# Patient Record
Sex: MALE | Race: WHITE | NOT HISPANIC OR LATINO
[De-identification: names, ages, dates, MRNs, and addresses within clinical notes are randomized per-mention and may not be internally consistent; named-entity substitution may affect disease eponyms.]

---

## 2017-04-05 ENCOUNTER — APPOINTMENT (OUTPATIENT)
Dept: OTOLARYNGOLOGY | Facility: CLINIC | Age: 67
End: 2017-04-05

## 2017-04-12 ENCOUNTER — OUTPATIENT (OUTPATIENT)
Dept: OUTPATIENT SERVICES | Facility: HOSPITAL | Age: 67
LOS: 1 days | Discharge: HOME | End: 2017-04-12

## 2017-05-17 ENCOUNTER — APPOINTMENT (OUTPATIENT)
Dept: OTOLARYNGOLOGY | Facility: CLINIC | Age: 67
End: 2017-05-17

## 2017-06-27 DIAGNOSIS — G93.9 DISORDER OF BRAIN, UNSPECIFIED: ICD-10-CM

## 2017-06-27 DIAGNOSIS — H53.8 OTHER VISUAL DISTURBANCES: ICD-10-CM

## 2017-07-25 ENCOUNTER — OUTPATIENT (OUTPATIENT)
Dept: OUTPATIENT SERVICES | Facility: HOSPITAL | Age: 67
LOS: 1 days | Discharge: HOME | End: 2017-07-25

## 2017-07-25 DIAGNOSIS — D32.0 BENIGN NEOPLASM OF CEREBRAL MENINGES: ICD-10-CM

## 2017-07-25 DIAGNOSIS — R55 SYNCOPE AND COLLAPSE: ICD-10-CM

## 2017-07-25 DIAGNOSIS — I10 ESSENTIAL (PRIMARY) HYPERTENSION: ICD-10-CM

## 2017-07-25 DIAGNOSIS — I48.91 UNSPECIFIED ATRIAL FIBRILLATION: ICD-10-CM

## 2017-07-25 DIAGNOSIS — I25.10 ATHEROSCLEROTIC HEART DISEASE OF NATIVE CORONARY ARTERY WITHOUT ANGINA PECTORIS: ICD-10-CM

## 2018-03-07 ENCOUNTER — APPOINTMENT (OUTPATIENT)
Dept: OTOLARYNGOLOGY | Facility: CLINIC | Age: 68
End: 2018-03-07
Payer: MEDICARE

## 2018-03-07 VITALS
HEIGHT: 71 IN | BODY MASS INDEX: 27.3 KG/M2 | WEIGHT: 195 LBS | DIASTOLIC BLOOD PRESSURE: 92 MMHG | SYSTOLIC BLOOD PRESSURE: 132 MMHG

## 2018-03-07 DIAGNOSIS — F17.200 NICOTINE DEPENDENCE, UNSPECIFIED, UNCOMPLICATED: ICD-10-CM

## 2018-03-07 PROCEDURE — 99214 OFFICE O/P EST MOD 30 MIN: CPT

## 2018-04-03 ENCOUNTER — OUTPATIENT (OUTPATIENT)
Dept: OUTPATIENT SERVICES | Facility: HOSPITAL | Age: 68
LOS: 1 days | Discharge: HOME | End: 2018-04-03

## 2018-04-03 VITALS
RESPIRATION RATE: 20 BRPM | TEMPERATURE: 97 F | OXYGEN SATURATION: 97 % | SYSTOLIC BLOOD PRESSURE: 141 MMHG | HEART RATE: 80 BPM | DIASTOLIC BLOOD PRESSURE: 84 MMHG | HEIGHT: 71 IN

## 2018-04-03 DIAGNOSIS — Z01.818 ENCOUNTER FOR OTHER PREPROCEDURAL EXAMINATION: ICD-10-CM

## 2018-04-03 DIAGNOSIS — D49.0 NEOPLASM OF UNSPECIFIED BEHAVIOR OF DIGESTIVE SYSTEM: ICD-10-CM

## 2018-04-03 DIAGNOSIS — I25.10 ATHEROSCLEROTIC HEART DISEASE OF NATIVE CORONARY ARTERY WITHOUT ANGINA PECTORIS: Chronic | ICD-10-CM

## 2018-04-03 LAB
ALBUMIN SERPL ELPH-MCNC: 4.1 G/DL — SIGNIFICANT CHANGE UP (ref 3.5–5.2)
ALP SERPL-CCNC: 67 U/L — SIGNIFICANT CHANGE UP (ref 30–115)
ALT FLD-CCNC: 13 U/L — SIGNIFICANT CHANGE UP (ref 0–41)
ANION GAP SERPL CALC-SCNC: 11 MMOL/L — SIGNIFICANT CHANGE UP (ref 7–14)
APTT BLD: 28.4 SEC — SIGNIFICANT CHANGE UP (ref 27–39.2)
AST SERPL-CCNC: 15 U/L — SIGNIFICANT CHANGE UP (ref 0–41)
BASOPHILS # BLD AUTO: 0.05 K/UL — SIGNIFICANT CHANGE UP (ref 0–0.2)
BASOPHILS NFR BLD AUTO: 0.7 % — SIGNIFICANT CHANGE UP (ref 0–1)
BILIRUB SERPL-MCNC: <0.2 MG/DL — SIGNIFICANT CHANGE UP (ref 0.2–1.2)
BUN SERPL-MCNC: 18 MG/DL — SIGNIFICANT CHANGE UP (ref 10–20)
CALCIUM SERPL-MCNC: 9.2 MG/DL — SIGNIFICANT CHANGE UP (ref 8.5–10.1)
CHLORIDE SERPL-SCNC: 105 MMOL/L — SIGNIFICANT CHANGE UP (ref 98–110)
CO2 SERPL-SCNC: 23 MMOL/L — SIGNIFICANT CHANGE UP (ref 17–32)
CREAT SERPL-MCNC: 1.1 MG/DL — SIGNIFICANT CHANGE UP (ref 0.7–1.5)
EOSINOPHIL # BLD AUTO: 0.1 K/UL — SIGNIFICANT CHANGE UP (ref 0–0.7)
EOSINOPHIL NFR BLD AUTO: 1.4 % — SIGNIFICANT CHANGE UP (ref 0–8)
GLUCOSE SERPL-MCNC: 102 MG/DL — HIGH (ref 70–99)
HCT VFR BLD CALC: 47.8 % — SIGNIFICANT CHANGE UP (ref 42–52)
HGB BLD-MCNC: 14.9 G/DL — SIGNIFICANT CHANGE UP (ref 14–18)
IMM GRANULOCYTES NFR BLD AUTO: 0.4 % — HIGH (ref 0.1–0.3)
INR BLD: 1.11 RATIO — SIGNIFICANT CHANGE UP (ref 0.65–1.3)
LYMPHOCYTES # BLD AUTO: 1.15 K/UL — LOW (ref 1.2–3.4)
LYMPHOCYTES # BLD AUTO: 16.5 % — LOW (ref 20.5–51.1)
MCHC RBC-ENTMCNC: 25.3 PG — LOW (ref 27–31)
MCHC RBC-ENTMCNC: 31.2 G/DL — LOW (ref 32–37)
MCV RBC AUTO: 81.3 FL — SIGNIFICANT CHANGE UP (ref 80–94)
MONOCYTES # BLD AUTO: 0.69 K/UL — HIGH (ref 0.1–0.6)
MONOCYTES NFR BLD AUTO: 9.9 % — HIGH (ref 1.7–9.3)
NEUTROPHILS # BLD AUTO: 4.94 K/UL — SIGNIFICANT CHANGE UP (ref 1.4–6.5)
NEUTROPHILS NFR BLD AUTO: 71.1 % — SIGNIFICANT CHANGE UP (ref 42.2–75.2)
NRBC # BLD: 0 /100 WBCS — SIGNIFICANT CHANGE UP (ref 0–0)
PLATELET # BLD AUTO: 200 K/UL — SIGNIFICANT CHANGE UP (ref 130–400)
POTASSIUM SERPL-MCNC: 4.4 MMOL/L — SIGNIFICANT CHANGE UP (ref 3.5–5)
POTASSIUM SERPL-SCNC: 4.4 MMOL/L — SIGNIFICANT CHANGE UP (ref 3.5–5)
PROT SERPL-MCNC: 7 G/DL — SIGNIFICANT CHANGE UP (ref 6–8)
PROTHROM AB SERPL-ACNC: 12 SEC — SIGNIFICANT CHANGE UP (ref 9.95–12.87)
RBC # BLD: 5.88 M/UL — SIGNIFICANT CHANGE UP (ref 4.7–6.1)
RBC # FLD: 19 % — HIGH (ref 11.5–14.5)
SODIUM SERPL-SCNC: 139 MMOL/L — SIGNIFICANT CHANGE UP (ref 135–146)
WBC # BLD: 6.96 K/UL — SIGNIFICANT CHANGE UP (ref 4.8–10.8)
WBC # FLD AUTO: 6.96 K/UL — SIGNIFICANT CHANGE UP (ref 4.8–10.8)

## 2018-04-03 NOTE — H&P PST ADULT - HISTORY OF PRESENT ILLNESS
58 Y/O MALE WITH H/O RIGHT PAROTID MASS SCHEDULED FOR EXCISION OF SAME  REPORTS NO C/O CP,SOB,PALPITATIONS,COUGH OR DYSURIA  1-2 FOS WITHOUT SOB

## 2018-04-03 NOTE — H&P PST ADULT - PMH
CAD (coronary artery disease)  HAD CARDIAC CATH WITH STENTS 2006  CAD (coronary artery disease)    COPD (chronic obstructive pulmonary disease)    Hypercholesteremia    Myocardial infarction  2006

## 2018-04-17 ENCOUNTER — APPOINTMENT (OUTPATIENT)
Dept: OTOLARYNGOLOGY | Facility: AMBULATORY SURGERY CENTER | Age: 68
End: 2018-04-17

## 2018-04-30 ENCOUNTER — OTHER (OUTPATIENT)
Age: 68
End: 2018-04-30

## 2018-05-01 ENCOUNTER — OUTPATIENT (OUTPATIENT)
Dept: OUTPATIENT SERVICES | Facility: HOSPITAL | Age: 68
LOS: 1 days | Discharge: HOME | End: 2018-05-01
Payer: MEDICARE

## 2018-05-01 ENCOUNTER — APPOINTMENT (OUTPATIENT)
Dept: OTOLARYNGOLOGY | Facility: AMBULATORY SURGERY CENTER | Age: 68
End: 2018-05-01

## 2018-05-01 ENCOUNTER — RESULT REVIEW (OUTPATIENT)
Age: 68
End: 2018-05-01

## 2018-05-01 VITALS
SYSTOLIC BLOOD PRESSURE: 120 MMHG | HEART RATE: 76 BPM | DIASTOLIC BLOOD PRESSURE: 78 MMHG | OXYGEN SATURATION: 98 % | RESPIRATION RATE: 18 BRPM

## 2018-05-01 VITALS
OXYGEN SATURATION: 96 % | TEMPERATURE: 98 F | SYSTOLIC BLOOD PRESSURE: 115 MMHG | DIASTOLIC BLOOD PRESSURE: 77 MMHG | RESPIRATION RATE: 18 BRPM | WEIGHT: 199.96 LBS | HEIGHT: 71 IN | HEART RATE: 62 BPM

## 2018-05-01 DIAGNOSIS — I25.10 ATHEROSCLEROTIC HEART DISEASE OF NATIVE CORONARY ARTERY WITHOUT ANGINA PECTORIS: Chronic | ICD-10-CM

## 2018-05-01 PROCEDURE — 42415 EXCISE PAROTID GLAND/LESION: CPT | Mod: RT

## 2018-05-01 RX ORDER — ONDANSETRON 8 MG/1
4 TABLET, FILM COATED ORAL ONCE
Qty: 0 | Refills: 0 | Status: DISCONTINUED | OUTPATIENT
Start: 2018-05-01 | End: 2018-05-16

## 2018-05-01 RX ORDER — MORPHINE SULFATE 50 MG/1
4 CAPSULE, EXTENDED RELEASE ORAL
Qty: 0 | Refills: 0 | Status: DISCONTINUED | OUTPATIENT
Start: 2018-05-01 | End: 2018-05-01

## 2018-05-01 NOTE — BRIEF OPERATIVE NOTE - PROCEDURE
<<-----Click on this checkbox to enter Procedure Parotidectomy with intraoperative facial nerve monitoring  05/01/2018    Active  [place]

## 2018-05-04 ENCOUNTER — APPOINTMENT (OUTPATIENT)
Dept: OTOLARYNGOLOGY | Facility: CLINIC | Age: 68
End: 2018-05-04
Payer: MEDICARE

## 2018-05-04 VITALS
BODY MASS INDEX: 27.3 KG/M2 | SYSTOLIC BLOOD PRESSURE: 129 MMHG | HEIGHT: 71 IN | WEIGHT: 195 LBS | DIASTOLIC BLOOD PRESSURE: 79 MMHG

## 2018-05-04 DIAGNOSIS — G89.18 OTHER ACUTE POSTPROCEDURAL PAIN: ICD-10-CM

## 2018-05-04 LAB — SURGICAL PATHOLOGY STUDY: SIGNIFICANT CHANGE UP

## 2018-05-04 PROCEDURE — 99024 POSTOP FOLLOW-UP VISIT: CPT

## 2018-05-07 DIAGNOSIS — K11.6 MUCOCELE OF SALIVARY GLAND: ICD-10-CM

## 2018-05-07 DIAGNOSIS — J44.9 CHRONIC OBSTRUCTIVE PULMONARY DISEASE, UNSPECIFIED: ICD-10-CM

## 2018-05-07 DIAGNOSIS — I25.10 ATHEROSCLEROTIC HEART DISEASE OF NATIVE CORONARY ARTERY WITHOUT ANGINA PECTORIS: ICD-10-CM

## 2018-05-07 DIAGNOSIS — D11.0 BENIGN NEOPLASM OF PAROTID GLAND: ICD-10-CM

## 2018-05-07 DIAGNOSIS — E78.00 PURE HYPERCHOLESTEROLEMIA, UNSPECIFIED: ICD-10-CM

## 2018-05-10 ENCOUNTER — APPOINTMENT (OUTPATIENT)
Dept: OTOLARYNGOLOGY | Facility: CLINIC | Age: 68
End: 2018-05-10
Payer: MEDICARE

## 2018-05-10 VITALS — WEIGHT: 195 LBS | BODY MASS INDEX: 36.82 KG/M2 | HEIGHT: 61 IN

## 2018-05-10 PROCEDURE — 99024 POSTOP FOLLOW-UP VISIT: CPT

## 2018-05-25 ENCOUNTER — APPOINTMENT (OUTPATIENT)
Dept: OTOLARYNGOLOGY | Facility: CLINIC | Age: 68
End: 2018-05-25
Payer: MEDICARE

## 2018-05-25 VITALS — WEIGHT: 195 LBS | BODY MASS INDEX: 36.82 KG/M2 | HEIGHT: 61 IN

## 2018-05-25 PROCEDURE — 99024 POSTOP FOLLOW-UP VISIT: CPT

## 2018-09-14 ENCOUNTER — APPOINTMENT (OUTPATIENT)
Dept: OTOLARYNGOLOGY | Facility: CLINIC | Age: 68
End: 2018-09-14

## 2018-10-11 PROBLEM — I25.10 ATHEROSCLEROTIC HEART DISEASE OF NATIVE CORONARY ARTERY WITHOUT ANGINA PECTORIS: Chronic | Status: ACTIVE | Noted: 2018-04-03

## 2018-10-11 PROBLEM — J44.9 CHRONIC OBSTRUCTIVE PULMONARY DISEASE, UNSPECIFIED: Chronic | Status: ACTIVE | Noted: 2018-04-03

## 2018-10-11 PROBLEM — E78.00 PURE HYPERCHOLESTEROLEMIA, UNSPECIFIED: Chronic | Status: ACTIVE | Noted: 2018-04-03

## 2018-10-11 PROBLEM — I21.9 ACUTE MYOCARDIAL INFARCTION, UNSPECIFIED: Chronic | Status: ACTIVE | Noted: 2018-04-03

## 2018-11-14 ENCOUNTER — APPOINTMENT (OUTPATIENT)
Dept: OTOLARYNGOLOGY | Facility: CLINIC | Age: 68
End: 2018-11-14
Payer: MEDICARE

## 2018-11-14 VITALS
WEIGHT: 195 LBS | BODY MASS INDEX: 36.82 KG/M2 | DIASTOLIC BLOOD PRESSURE: 89 MMHG | HEIGHT: 61 IN | SYSTOLIC BLOOD PRESSURE: 133 MMHG

## 2018-11-14 PROCEDURE — 99213 OFFICE O/P EST LOW 20 MIN: CPT

## 2019-05-13 ENCOUNTER — APPOINTMENT (OUTPATIENT)
Dept: OTOLARYNGOLOGY | Facility: CLINIC | Age: 69
End: 2019-05-13

## 2019-07-17 ENCOUNTER — APPOINTMENT (OUTPATIENT)
Dept: OTOLARYNGOLOGY | Facility: CLINIC | Age: 69
End: 2019-07-17
Payer: MEDICARE

## 2019-07-17 VITALS — BODY MASS INDEX: 27.3 KG/M2 | HEIGHT: 71 IN | WEIGHT: 195 LBS

## 2019-07-17 PROCEDURE — 92550 TYMPANOMETRY & REFLEX THRESH: CPT

## 2019-07-17 PROCEDURE — 92557 COMPREHENSIVE HEARING TEST: CPT

## 2019-07-17 PROCEDURE — 99213 OFFICE O/P EST LOW 20 MIN: CPT | Mod: 25

## 2019-07-17 PROCEDURE — G0268 REMOVAL OF IMPACTED WAX MD: CPT

## 2019-07-17 NOTE — PHYSICAL EXAM
[de-identified] : soft surgical area.  [de-identified] : left impacted wax cleaned with curette [Midline] : trachea located in midline position [Normal] : no rashes

## 2019-07-17 NOTE — HISTORY OF PRESENT ILLNESS
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 Patient underwent bilateral parotidectomy with nerve dissection. Patient feeling well; no new changes. He is complaining of bilateral itchy ears. Has not had a hearing test in a while.

## 2019-07-23 ENCOUNTER — OUTPATIENT (OUTPATIENT)
Dept: OUTPATIENT SERVICES | Facility: HOSPITAL | Age: 69
LOS: 1 days | Discharge: HOME | End: 2019-07-23
Payer: MEDICARE

## 2019-07-23 ENCOUNTER — TRANSCRIPTION ENCOUNTER (OUTPATIENT)
Age: 69
End: 2019-07-23

## 2019-07-23 ENCOUNTER — RESULT REVIEW (OUTPATIENT)
Age: 69
End: 2019-07-23

## 2019-07-23 VITALS
HEIGHT: 71 IN | WEIGHT: 195.11 LBS | DIASTOLIC BLOOD PRESSURE: 69 MMHG | HEART RATE: 78 BPM | TEMPERATURE: 98 F | SYSTOLIC BLOOD PRESSURE: 105 MMHG | RESPIRATION RATE: 16 BRPM

## 2019-07-23 DIAGNOSIS — I25.10 ATHEROSCLEROTIC HEART DISEASE OF NATIVE CORONARY ARTERY WITHOUT ANGINA PECTORIS: Chronic | ICD-10-CM

## 2019-07-23 DIAGNOSIS — Z95.5 PRESENCE OF CORONARY ANGIOPLASTY IMPLANT AND GRAFT: Chronic | ICD-10-CM

## 2019-07-23 PROCEDURE — 88305 TISSUE EXAM BY PATHOLOGIST: CPT | Mod: 26

## 2019-07-23 RX ORDER — SIMVASTATIN 20 MG/1
1 TABLET, FILM COATED ORAL
Qty: 0 | Refills: 0 | DISCHARGE

## 2019-07-23 NOTE — ASU PATIENT PROFILE, ADULT - PMH
CAD (coronary artery disease)  HAD CARDIAC CATH WITH STENTS 2006  CAD (coronary artery disease)    COPD (chronic obstructive pulmonary disease)    HTN (hypertension)    Hypercholesteremia    Myocardial infarction  2006

## 2019-07-23 NOTE — ASU PATIENT PROFILE, ADULT - PSH
CAD (coronary artery disease)  CARDIAC CATH 2006  AICD 2015  History of coronary artery stent placement

## 2019-07-23 NOTE — ASU DISCHARGE PLAN (ADULT/PEDIATRIC) - CALL YOUR DOCTOR IF YOU HAVE ANY OF THE FOLLOWING:
Bleeding that does not stop/Nausea and vomiting that does not stop/Pain not relieved by Medications/Inability to tolerate liquids or foods

## 2019-07-25 LAB — SURGICAL PATHOLOGY STUDY: SIGNIFICANT CHANGE UP

## 2019-07-26 DIAGNOSIS — K64.8 OTHER HEMORRHOIDS: ICD-10-CM

## 2019-07-26 DIAGNOSIS — D12.5 BENIGN NEOPLASM OF SIGMOID COLON: ICD-10-CM

## 2019-07-26 DIAGNOSIS — K57.32 DIVERTICULITIS OF LARGE INTESTINE WITHOUT PERFORATION OR ABSCESS WITHOUT BLEEDING: ICD-10-CM

## 2019-07-26 DIAGNOSIS — Z79.02 LONG TERM (CURRENT) USE OF ANTITHROMBOTICS/ANTIPLATELETS: ICD-10-CM

## 2019-07-26 DIAGNOSIS — Z95.5 PRESENCE OF CORONARY ANGIOPLASTY IMPLANT AND GRAFT: ICD-10-CM

## 2019-07-26 DIAGNOSIS — Z12.11 ENCOUNTER FOR SCREENING FOR MALIGNANT NEOPLASM OF COLON: ICD-10-CM

## 2019-07-26 DIAGNOSIS — E78.00 PURE HYPERCHOLESTEROLEMIA, UNSPECIFIED: ICD-10-CM

## 2019-07-26 DIAGNOSIS — I25.10 ATHEROSCLEROTIC HEART DISEASE OF NATIVE CORONARY ARTERY WITHOUT ANGINA PECTORIS: ICD-10-CM

## 2019-07-26 DIAGNOSIS — I10 ESSENTIAL (PRIMARY) HYPERTENSION: ICD-10-CM

## 2019-07-26 DIAGNOSIS — D12.3 BENIGN NEOPLASM OF TRANSVERSE COLON: ICD-10-CM

## 2019-07-26 DIAGNOSIS — J44.9 CHRONIC OBSTRUCTIVE PULMONARY DISEASE, UNSPECIFIED: ICD-10-CM

## 2019-07-26 DIAGNOSIS — D12.2 BENIGN NEOPLASM OF ASCENDING COLON: ICD-10-CM

## 2019-07-26 DIAGNOSIS — D12.4 BENIGN NEOPLASM OF DESCENDING COLON: ICD-10-CM

## 2019-07-26 DIAGNOSIS — Z79.82 LONG TERM (CURRENT) USE OF ASPIRIN: ICD-10-CM

## 2019-07-26 DIAGNOSIS — I25.2 OLD MYOCARDIAL INFARCTION: ICD-10-CM

## 2019-07-26 DIAGNOSIS — K64.4 RESIDUAL HEMORRHOIDAL SKIN TAGS: ICD-10-CM

## 2019-10-02 VITALS — WEIGHT: 207.01 LBS | HEIGHT: 65 IN

## 2020-01-10 ENCOUNTER — APPOINTMENT (OUTPATIENT)
Dept: OTOLARYNGOLOGY | Facility: CLINIC | Age: 70
End: 2020-01-10

## 2020-01-10 PROBLEM — I10 ESSENTIAL (PRIMARY) HYPERTENSION: Chronic | Status: ACTIVE | Noted: 2019-07-23

## 2020-02-10 ENCOUNTER — INPATIENT (INPATIENT)
Facility: HOSPITAL | Age: 70
LOS: 0 days | Discharge: HOME | End: 2020-02-11
Attending: INTERNAL MEDICINE | Admitting: INTERNAL MEDICINE
Payer: MEDICARE

## 2020-02-10 VITALS
TEMPERATURE: 99 F | DIASTOLIC BLOOD PRESSURE: 58 MMHG | SYSTOLIC BLOOD PRESSURE: 113 MMHG | RESPIRATION RATE: 18 BRPM | OXYGEN SATURATION: 98 % | HEART RATE: 78 BPM

## 2020-02-10 DIAGNOSIS — Z95.5 PRESENCE OF CORONARY ANGIOPLASTY IMPLANT AND GRAFT: Chronic | ICD-10-CM

## 2020-02-10 DIAGNOSIS — I25.10 ATHEROSCLEROTIC HEART DISEASE OF NATIVE CORONARY ARTERY WITHOUT ANGINA PECTORIS: Chronic | ICD-10-CM

## 2020-02-10 LAB
ALBUMIN SERPL ELPH-MCNC: 4.3 G/DL — SIGNIFICANT CHANGE UP (ref 3.5–5.2)
ALP SERPL-CCNC: 63 U/L — SIGNIFICANT CHANGE UP (ref 30–115)
ALT FLD-CCNC: 13 U/L — SIGNIFICANT CHANGE UP (ref 0–41)
ANION GAP SERPL CALC-SCNC: 13 MMOL/L — SIGNIFICANT CHANGE UP (ref 7–14)
AST SERPL-CCNC: 14 U/L — SIGNIFICANT CHANGE UP (ref 0–41)
BASOPHILS # BLD AUTO: 0.04 K/UL — SIGNIFICANT CHANGE UP (ref 0–0.2)
BASOPHILS NFR BLD AUTO: 0.5 % — SIGNIFICANT CHANGE UP (ref 0–1)
BILIRUB SERPL-MCNC: 0.3 MG/DL — SIGNIFICANT CHANGE UP (ref 0.2–1.2)
BUN SERPL-MCNC: 20 MG/DL — SIGNIFICANT CHANGE UP (ref 10–20)
CALCIUM SERPL-MCNC: 9.9 MG/DL — SIGNIFICANT CHANGE UP (ref 8.5–10.1)
CHLORIDE SERPL-SCNC: 102 MMOL/L — SIGNIFICANT CHANGE UP (ref 98–110)
CK MB CFR SERPL CALC: 3 NG/ML — SIGNIFICANT CHANGE UP (ref 0.6–6.3)
CK SERPL-CCNC: 69 U/L — SIGNIFICANT CHANGE UP (ref 0–225)
CO2 SERPL-SCNC: 20 MMOL/L — SIGNIFICANT CHANGE UP (ref 17–32)
CREAT SERPL-MCNC: 1.2 MG/DL — SIGNIFICANT CHANGE UP (ref 0.7–1.5)
EOSINOPHIL # BLD AUTO: 0.06 K/UL — SIGNIFICANT CHANGE UP (ref 0–0.7)
EOSINOPHIL NFR BLD AUTO: 0.8 % — SIGNIFICANT CHANGE UP (ref 0–8)
GLUCOSE SERPL-MCNC: 103 MG/DL — HIGH (ref 70–99)
HCT VFR BLD CALC: 50.4 % — SIGNIFICANT CHANGE UP (ref 42–52)
HGB BLD-MCNC: 16.3 G/DL — SIGNIFICANT CHANGE UP (ref 14–18)
IMM GRANULOCYTES NFR BLD AUTO: 0.3 % — SIGNIFICANT CHANGE UP (ref 0.1–0.3)
LYMPHOCYTES # BLD AUTO: 1.25 K/UL — SIGNIFICANT CHANGE UP (ref 1.2–3.4)
LYMPHOCYTES # BLD AUTO: 16.3 % — LOW (ref 20.5–51.1)
MAGNESIUM SERPL-MCNC: 2 MG/DL — SIGNIFICANT CHANGE UP (ref 1.8–2.4)
MCHC RBC-ENTMCNC: 28 PG — SIGNIFICANT CHANGE UP (ref 27–31)
MCHC RBC-ENTMCNC: 32.3 G/DL — SIGNIFICANT CHANGE UP (ref 32–37)
MCV RBC AUTO: 86.4 FL — SIGNIFICANT CHANGE UP (ref 80–94)
MONOCYTES # BLD AUTO: 0.69 K/UL — HIGH (ref 0.1–0.6)
MONOCYTES NFR BLD AUTO: 9 % — SIGNIFICANT CHANGE UP (ref 1.7–9.3)
NEUTROPHILS # BLD AUTO: 5.6 K/UL — SIGNIFICANT CHANGE UP (ref 1.4–6.5)
NEUTROPHILS NFR BLD AUTO: 73.1 % — SIGNIFICANT CHANGE UP (ref 42.2–75.2)
NRBC # BLD: 0 /100 WBCS — SIGNIFICANT CHANGE UP (ref 0–0)
NT-PROBNP SERPL-SCNC: 258 PG/ML — SIGNIFICANT CHANGE UP (ref 0–300)
PLATELET # BLD AUTO: 169 K/UL — SIGNIFICANT CHANGE UP (ref 130–400)
POTASSIUM SERPL-MCNC: 4.3 MMOL/L — SIGNIFICANT CHANGE UP (ref 3.5–5)
POTASSIUM SERPL-SCNC: 4.3 MMOL/L — SIGNIFICANT CHANGE UP (ref 3.5–5)
PROT SERPL-MCNC: 7.3 G/DL — SIGNIFICANT CHANGE UP (ref 6–8)
RBC # BLD: 5.83 M/UL — SIGNIFICANT CHANGE UP (ref 4.7–6.1)
RBC # FLD: 16.5 % — HIGH (ref 11.5–14.5)
SODIUM SERPL-SCNC: 135 MMOL/L — SIGNIFICANT CHANGE UP (ref 135–146)
TROPONIN T SERPL-MCNC: <0.01 NG/ML — SIGNIFICANT CHANGE UP
TROPONIN T SERPL-MCNC: <0.01 NG/ML — SIGNIFICANT CHANGE UP
WBC # BLD: 7.66 K/UL — SIGNIFICANT CHANGE UP (ref 4.8–10.8)
WBC # FLD AUTO: 7.66 K/UL — SIGNIFICANT CHANGE UP (ref 4.8–10.8)

## 2020-02-10 PROCEDURE — 93010 ELECTROCARDIOGRAM REPORT: CPT

## 2020-02-10 PROCEDURE — 99285 EMERGENCY DEPT VISIT HI MDM: CPT

## 2020-02-10 PROCEDURE — 71046 X-RAY EXAM CHEST 2 VIEWS: CPT | Mod: 26

## 2020-02-10 RX ORDER — ALBUTEROL 90 UG/1
2 AEROSOL, METERED ORAL EVERY 6 HOURS
Refills: 0 | Status: DISCONTINUED | OUTPATIENT
Start: 2020-02-10 | End: 2020-02-11

## 2020-02-10 RX ORDER — ASPIRIN/CALCIUM CARB/MAGNESIUM 324 MG
81 TABLET ORAL DAILY
Refills: 0 | Status: DISCONTINUED | OUTPATIENT
Start: 2020-02-10 | End: 2020-02-11

## 2020-02-10 RX ORDER — PANTOPRAZOLE SODIUM 20 MG/1
40 TABLET, DELAYED RELEASE ORAL
Refills: 0 | Status: DISCONTINUED | OUTPATIENT
Start: 2020-02-10 | End: 2020-02-11

## 2020-02-10 RX ORDER — METOPROLOL TARTRATE 50 MG
1 TABLET ORAL
Qty: 0 | Refills: 0 | DISCHARGE

## 2020-02-10 RX ORDER — OMEGA-3 ACID ETHYL ESTERS 1 G
1 CAPSULE ORAL
Qty: 0 | Refills: 0 | DISCHARGE

## 2020-02-10 RX ORDER — IPRATROPIUM/ALBUTEROL SULFATE 18-103MCG
3 AEROSOL WITH ADAPTER (GRAM) INHALATION EVERY 6 HOURS
Refills: 0 | Status: DISCONTINUED | OUTPATIENT
Start: 2020-02-10 | End: 2020-02-11

## 2020-02-10 RX ORDER — METOPROLOL TARTRATE 50 MG
50 TABLET ORAL DAILY
Refills: 0 | Status: DISCONTINUED | OUTPATIENT
Start: 2020-02-10 | End: 2020-02-11

## 2020-02-10 RX ORDER — ALBUTEROL 90 UG/1
2 AEROSOL, METERED ORAL
Qty: 0 | Refills: 0 | DISCHARGE

## 2020-02-10 RX ORDER — AMLODIPINE BESYLATE 2.5 MG/1
5 TABLET ORAL DAILY
Refills: 0 | Status: DISCONTINUED | OUTPATIENT
Start: 2020-02-10 | End: 2020-02-11

## 2020-02-10 RX ORDER — HEPARIN SODIUM 5000 [USP'U]/ML
5000 INJECTION INTRAVENOUS; SUBCUTANEOUS EVERY 8 HOURS
Refills: 0 | Status: DISCONTINUED | OUTPATIENT
Start: 2020-02-10 | End: 2020-02-11

## 2020-02-10 RX ORDER — ATORVASTATIN CALCIUM 80 MG/1
40 TABLET, FILM COATED ORAL AT BEDTIME
Refills: 0 | Status: DISCONTINUED | OUTPATIENT
Start: 2020-02-10 | End: 2020-02-11

## 2020-02-10 RX ORDER — ASPIRIN/CALCIUM CARB/MAGNESIUM 324 MG
243 TABLET ORAL ONCE
Refills: 0 | Status: COMPLETED | OUTPATIENT
Start: 2020-02-10 | End: 2020-02-10

## 2020-02-10 RX ORDER — CLOPIDOGREL BISULFATE 75 MG/1
75 TABLET, FILM COATED ORAL DAILY
Refills: 0 | Status: DISCONTINUED | OUTPATIENT
Start: 2020-02-10 | End: 2020-02-11

## 2020-02-10 RX ORDER — CHOLECALCIFEROL (VITAMIN D3) 125 MCG
1 CAPSULE ORAL
Qty: 0 | Refills: 0 | DISCHARGE

## 2020-02-10 RX ORDER — CHLORHEXIDINE GLUCONATE 213 G/1000ML
1 SOLUTION TOPICAL
Refills: 0 | Status: DISCONTINUED | OUTPATIENT
Start: 2020-02-10 | End: 2020-02-11

## 2020-02-10 RX ORDER — LISINOPRIL 2.5 MG/1
5 TABLET ORAL DAILY
Refills: 0 | Status: DISCONTINUED | OUTPATIENT
Start: 2020-02-10 | End: 2020-02-11

## 2020-02-10 RX ADMIN — Medication 243 MILLIGRAM(S): at 17:36

## 2020-02-10 RX ADMIN — ATORVASTATIN CALCIUM 40 MILLIGRAM(S): 80 TABLET, FILM COATED ORAL at 21:56

## 2020-02-10 NOTE — H&P ADULT - HISTORY OF PRESENT ILLNESS
70 yo M  PMH significant for HTN, CAD s/p 5 stent placements, h/o MI in 2006 s/p AICD, HLD, active smoker ( 1/5 pack per day x > 10 years)  Other PMH includes COPD  Patient denies history of diabetes, stroke / TIA  History:   Patient presents for the evaluation of chest discomfort. Patient notes he has been having intermittent discomfort since November 2019 that he initially attributed to stress. Patient notes it is more of a discomfort rather than a chest pain like a poking sensation, located on the anterior b/l chest wall, no associated with exertion, occurs at rest, provoking factors include stress, palliative factors none, radiating to b/l arms with paresthesias intermittent in nature. Not associated with n/v/diaphoresis. Patient was unsure if this was his AICD firing ( last time it fired was in 11/2019 ). Patient recently evaluated by cardiologist as outpatient and planned for outpatient stress test. No orthopnea, no dyspnea on exertion, no lower extremity edema. Denies f/chills/ n/v/d/c, palpitations, sob, abdominal or back pain. Admits to generalized fatigue. 68 yo M  PMH significant for HTN, CAD s/p 5 stent placements, h/o MI in 2006 s/p AICD, HLD, active smoker ( 1/2 pack per day x > 10 years)  Other PMH includes COPD  Patient denies history of diabetes, stroke / TIA  History:   Patient presents for the evaluation of chest discomfort. Patient notes he has been having intermittent discomfort since November 2019 that he initially attributed to stress. Patient notes it is more of a discomfort rather than a chest pain like a poking sensation, located on the anterior b/l chest wall, no associated with exertion, occurs at rest, provoking factors include stress, palliative factors none, radiating to b/l arms with paresthesias intermittent in nature. Not associated with n/v/diaphoresis. Patient was unsure if this was his AICD firing ( last time it fired was in 11/2019 ). Patient recently evaluated by cardiologist as outpatient and planned for outpatient stress test. No orthopnea, no dyspnea on exertion, no lower extremity edema. Denies f/chills/ n/v/d/c, palpitations, sob, abdominal or back pain. Admits to generalized fatigue.

## 2020-02-10 NOTE — ED PROVIDER NOTE - OBJECTIVE STATEMENT
69 y.o male w/ hx of HTN, HLD, MI 2006, CAD x 5 stents presents to the ED for evaluation of chest pain x 1 week.  FOr past week intermittent L sided chest pain described as sharp, intermittent, nonexertional, nonpleuritic, mild severity, lasts for seconds, radiates down L arm. NO neck pain, hx of disc herniations, recent trauma/injury, falls, dyspnea, orthopnea, edema of lower extremities, calf pain, recent travel.  No further complaints. Took 81 mg asa today.

## 2020-02-10 NOTE — ED PROVIDER NOTE - PHYSICAL EXAMINATION
CONST: Well appearing in NAD  EYES:  Sclera and conjunctiva clear.  NECK: Non-tender, supple   CARD: Normal S1 S2; Normal rate and rhythm  RESP: Equal BS B/L, No wheezes, rhonchi or rales. No distress  GI: Soft, non-tender, non-distended.  MS: Normal ROM in all extremities. No edema of lower extremities, no calf pain, radial pulses 2+ bilaterally  SKIN: Warm, dry, no acute rashes. Good turgor  NEURO: A&Ox3, No focal deficits. Strength 5/5 with no sensory deficits. Steady gait

## 2020-02-10 NOTE — H&P ADULT - NSICDXPASTMEDICALHX_GEN_ALL_CORE_FT
PAST MEDICAL HISTORY:  CAD (coronary artery disease)     CAD (coronary artery disease) HAD CARDIAC CATH WITH STENTS 2006    COPD (chronic obstructive pulmonary disease)     HTN (hypertension)     Hypercholesteremia     Myocardial infarction 2006

## 2020-02-10 NOTE — H&P ADULT - ASSESSMENT
68 yo M  PMH significant for HTN, CAD s/p 5 stent placements, h/o MI in 2006 s/p AICD, HLD, active smoker ( 1/5 pack per day x > 10 years)  Other PMH includes COPD  Patient denies history of diabetes, stroke / TIA  cc: Atypical chest pain    ASSESSMENT / PLAN:    # CAD s/p 5 stent placements  # h/o MI in 2006 s/p AICD  # Atypical Chest Pain - r/o unstable angina vs acs  - EKG reviewed q waves v1-v6 with t wave inversion in v4-v6  - Previous EKG with q waves seen ( 2018 ) t wave inversions not noted  - Cardiac enzymes x 3, first set negative second set at 2000  - Obtain ECHO   - Continue dpat, statin, metoprolol XL, and lisinopril ( at home takes benazapril however non-formulary)  - Cardiology eval: Dr. Guzman's service   - Keep NPO after midnight until cardiology evaluation    # HTN: Continue Amlodipine    # HLD: Continue Atorvastatin and Lisinopril    # OTHERS:  - dvt ppx: heparin  - gi ppx: Protonix  - diet: dash  - activity oobtc  - status: code  - dispo: acute  - chg daily and prn

## 2020-02-10 NOTE — ED PROVIDER NOTE - NS ED ROS FT
Constitutional: See HPI.  Eyes: No visual changes, eye pain or discharge.   ENMT: No hearing changes, pain, discharge or infections.   Cardiac: + chest pain. No SOB or edema. No chest pain with exertion.  Respiratory: No cough or respiratory distress  GI: No nausea, vomiting, diarrhea or abdominal pain.  : No dysuria, frequency or burning. No Discharge  MS: No myalgia, muscle weakness, joint pain or back pain.  Neuro: No headache or weakness.  Skin: No skin rash.  Except as documented in the HPI, all other systems are negative.

## 2020-02-10 NOTE — PATIENT PROFILE ADULT - HAS THE PATIENT BEEN ADMITTED FROM SHORT TERM REHAB?
----- Message from [employer] sent at 3/29/2019 11:58 AM EDT -----  Regarding: /Telephone  Pt requesting a Rx to be called into [address] [place] [address] at [phone] due to left ear pain.  Best contact:[phone] no

## 2020-02-10 NOTE — H&P ADULT - NSHPLABSRESULTS_GEN_ALL_CORE
16.3   7.66  )-----------( 169      ( 10 Feb 2020 14:50 )             50.4     02-10    135  |  102  |  20  ----------------------------<  103<H>  4.3   |  20  |  1.2    Ca    9.9      10 Feb 2020 14:50  Mg     2.0     02-10    TPro  7.3  /  Alb  4.3  /  TBili  0.3  /  DBili  x   /  AST  14  /  ALT  13  /  AlkPhos  63  02-10        Lactate Trend    CARDIAC MARKERS ( 10 Feb 2020 14:50 )  x     / <0.01 ng/mL / x     / x     / x        CAPILLARY BLOOD GLUCOSE    EKG reviewed q waves v1-v6 with t wave inversion in v4-v6

## 2020-02-10 NOTE — H&P ADULT - NSICDXPASTSURGICALHX_GEN_ALL_CORE_FT
PAST SURGICAL HISTORY:  CAD (coronary artery disease) CARDIAC CATH 2006  AICD 2015    History of coronary artery stent placement

## 2020-02-10 NOTE — H&P ADULT - CARDIOVASCULAR DETAILS
From: Sammy Back  To: Jimbo Neri MD  Sent: 2/4/2018 7:42 PM CST  Subject: Medication    Dr. Sergei Mosqueda, as you know my wife Isabella Sethi (7/25/65 has been uninsured for over a year. As a result I have been sharing my quitiapine with her. As of 2/1/18 she is now covered. Unfortunately I ran out of my 90 day supply early. Could please send a prescription for this for myself and my wife. We are both scheduled to see you this month. Thank you! positive S2/positive S1

## 2020-02-11 ENCOUNTER — TRANSCRIPTION ENCOUNTER (OUTPATIENT)
Age: 70
End: 2020-02-11

## 2020-02-11 VITALS
TEMPERATURE: 97 F | HEART RATE: 67 BPM | SYSTOLIC BLOOD PRESSURE: 125 MMHG | RESPIRATION RATE: 18 BRPM | WEIGHT: 181 LBS | DIASTOLIC BLOOD PRESSURE: 76 MMHG

## 2020-02-11 LAB
ALBUMIN SERPL ELPH-MCNC: 4.1 G/DL — SIGNIFICANT CHANGE UP (ref 3.5–5.2)
ALP SERPL-CCNC: 59 U/L — SIGNIFICANT CHANGE UP (ref 30–115)
ALT FLD-CCNC: 13 U/L — SIGNIFICANT CHANGE UP (ref 0–41)
ANION GAP SERPL CALC-SCNC: 15 MMOL/L — HIGH (ref 7–14)
AST SERPL-CCNC: 13 U/L — SIGNIFICANT CHANGE UP (ref 0–41)
BASOPHILS # BLD AUTO: 0.06 K/UL — SIGNIFICANT CHANGE UP (ref 0–0.2)
BASOPHILS NFR BLD AUTO: 0.9 % — SIGNIFICANT CHANGE UP (ref 0–1)
BILIRUB DIRECT SERPL-MCNC: <0.2 MG/DL — SIGNIFICANT CHANGE UP (ref 0–0.2)
BILIRUB INDIRECT FLD-MCNC: >0.1 MG/DL — LOW (ref 0.2–1.2)
BILIRUB SERPL-MCNC: 0.3 MG/DL — SIGNIFICANT CHANGE UP (ref 0.2–1.2)
BUN SERPL-MCNC: 22 MG/DL — HIGH (ref 10–20)
CALCIUM SERPL-MCNC: 9.4 MG/DL — SIGNIFICANT CHANGE UP (ref 8.5–10.1)
CHLORIDE SERPL-SCNC: 105 MMOL/L — SIGNIFICANT CHANGE UP (ref 98–110)
CK MB CFR SERPL CALC: 3.1 NG/ML — SIGNIFICANT CHANGE UP (ref 0.6–6.3)
CK SERPL-CCNC: 74 U/L — SIGNIFICANT CHANGE UP (ref 0–225)
CO2 SERPL-SCNC: 20 MMOL/L — SIGNIFICANT CHANGE UP (ref 17–32)
CREAT SERPL-MCNC: 1.1 MG/DL — SIGNIFICANT CHANGE UP (ref 0.7–1.5)
EOSINOPHIL # BLD AUTO: 0.12 K/UL — SIGNIFICANT CHANGE UP (ref 0–0.7)
EOSINOPHIL NFR BLD AUTO: 1.9 % — SIGNIFICANT CHANGE UP (ref 0–8)
GLUCOSE SERPL-MCNC: 108 MG/DL — HIGH (ref 70–99)
HCT VFR BLD CALC: 49.3 % — SIGNIFICANT CHANGE UP (ref 42–52)
HCV AB S/CO SERPL IA: 0.53 S/CO — SIGNIFICANT CHANGE UP (ref 0–0.99)
HCV AB SERPL-IMP: SIGNIFICANT CHANGE UP
HGB BLD-MCNC: 15.8 G/DL — SIGNIFICANT CHANGE UP (ref 14–18)
IMM GRANULOCYTES NFR BLD AUTO: 0.3 % — SIGNIFICANT CHANGE UP (ref 0.1–0.3)
LYMPHOCYTES # BLD AUTO: 1.45 K/UL — SIGNIFICANT CHANGE UP (ref 1.2–3.4)
LYMPHOCYTES # BLD AUTO: 22.7 % — SIGNIFICANT CHANGE UP (ref 20.5–51.1)
MAGNESIUM SERPL-MCNC: 2.1 MG/DL — SIGNIFICANT CHANGE UP (ref 1.8–2.4)
MCHC RBC-ENTMCNC: 27.5 PG — SIGNIFICANT CHANGE UP (ref 27–31)
MCHC RBC-ENTMCNC: 32 G/DL — SIGNIFICANT CHANGE UP (ref 32–37)
MCV RBC AUTO: 85.7 FL — SIGNIFICANT CHANGE UP (ref 80–94)
MONOCYTES # BLD AUTO: 0.67 K/UL — HIGH (ref 0.1–0.6)
MONOCYTES NFR BLD AUTO: 10.5 % — HIGH (ref 1.7–9.3)
NEUTROPHILS # BLD AUTO: 4.08 K/UL — SIGNIFICANT CHANGE UP (ref 1.4–6.5)
NEUTROPHILS NFR BLD AUTO: 63.7 % — SIGNIFICANT CHANGE UP (ref 42.2–75.2)
NRBC # BLD: 0 /100 WBCS — SIGNIFICANT CHANGE UP (ref 0–0)
PLATELET # BLD AUTO: 167 K/UL — SIGNIFICANT CHANGE UP (ref 130–400)
POTASSIUM SERPL-MCNC: 4.8 MMOL/L — SIGNIFICANT CHANGE UP (ref 3.5–5)
POTASSIUM SERPL-SCNC: 4.8 MMOL/L — SIGNIFICANT CHANGE UP (ref 3.5–5)
PROT SERPL-MCNC: 6.8 G/DL — SIGNIFICANT CHANGE UP (ref 6–8)
RBC # BLD: 5.75 M/UL — SIGNIFICANT CHANGE UP (ref 4.7–6.1)
RBC # FLD: 15.9 % — HIGH (ref 11.5–14.5)
SODIUM SERPL-SCNC: 140 MMOL/L — SIGNIFICANT CHANGE UP (ref 135–146)
TROPONIN T SERPL-MCNC: <0.01 NG/ML — SIGNIFICANT CHANGE UP
WBC # BLD: 6.4 K/UL — SIGNIFICANT CHANGE UP (ref 4.8–10.8)
WBC # FLD AUTO: 6.4 K/UL — SIGNIFICANT CHANGE UP (ref 4.8–10.8)

## 2020-02-11 PROCEDURE — 99239 HOSP IP/OBS DSCHRG MGMT >30: CPT

## 2020-02-11 RX ORDER — FUROSEMIDE 40 MG
1 TABLET ORAL
Qty: 14 | Refills: 0
Start: 2020-02-11 | End: 2020-02-24

## 2020-02-11 RX ORDER — POTASSIUM CHLORIDE 20 MEQ
1 PACKET (EA) ORAL
Qty: 14 | Refills: 0
Start: 2020-02-11 | End: 2020-02-24

## 2020-02-11 RX ADMIN — Medication 81 MILLIGRAM(S): at 11:23

## 2020-02-11 RX ADMIN — CLOPIDOGREL BISULFATE 75 MILLIGRAM(S): 75 TABLET, FILM COATED ORAL at 11:23

## 2020-02-11 RX ADMIN — Medication 3 MILLILITER(S): at 08:32

## 2020-02-11 NOTE — DISCHARGE NOTE PROVIDER - NSDCMRMEDTOKEN_GEN_ALL_CORE_FT
amLODIPine 5 mg oral tablet: 1 tab(s) orally once a day  aspirin 81 mg oral tablet, chewable: 1 tab(s) orally once a day  atorvastatin 40 mg oral tablet: 1 tab(s) orally once a day  benazepril 5 mg oral tablet: 1 tab(s) orally once a day  K-Tab 10 mEq oral tablet, extended release: 1 tab(s) orally once a day   Lasix 40 mg oral tablet: 1 tab(s) orally once a day   Metoprolol Succinate ER 50 mg oral tablet, extended release: 1 tab(s) orally once a day  Nitrostat 0.3 mg sublingual tablet: 1 tab(s) sublingual every 5 minutes, As Needed  Plavix 75 mg oral tablet: 1 tab(s) orally once a day  Ventolin HFA 90 mcg/inh inhalation aerosol: 2 puff(s) inhaled 4 times a day, As Needed

## 2020-02-11 NOTE — PROGRESS NOTE ADULT - SUBJECTIVE AND OBJECTIVE BOX
SUBJECTIVE:    Patient is a 69y old Male who presents with a chief complaint of Chest discomfort (11 Feb 2020 08:19)    Currently admitted to medicine with the primary diagnosis of Chest pain     Today is hospital day 1d. This morning he is resting comfortably in bed and reports no new issues or overnight events.     INTERVAL EVENTS: Chest tingling resolved.     PAST MEDICAL & SURGICAL HISTORY  HTN (hypertension)  Hypercholesteremia  CAD (coronary artery disease): HAD CARDIAC CATH WITH STENTS 2006  Myocardial infarction: 2006  CAD (coronary artery disease)  COPD (chronic obstructive pulmonary disease)  History of coronary artery stent placement  CAD (coronary artery disease): CARDIAC CATH 2006  AICD 2015      ALLERGIES:  No Known Allergies    MEDICATIONS:  STANDING MEDICATIONS  albuterol/ipratropium for Nebulization 3 milliLiter(s) Nebulizer every 6 hours  amLODIPine   Tablet 5 milliGRAM(s) Oral daily  aspirin  chewable 81 milliGRAM(s) Oral daily  atorvastatin 40 milliGRAM(s) Oral at bedtime  chlorhexidine 4% Liquid 1 Application(s) Topical <User Schedule>  clopidogrel Tablet 75 milliGRAM(s) Oral daily  heparin  Injectable 5000 Unit(s) SubCutaneous every 8 hours  lisinopril 5 milliGRAM(s) Oral daily  metoprolol succinate ER 50 milliGRAM(s) Oral daily  pantoprazole    Tablet 40 milliGRAM(s) Oral before breakfast    PRN MEDICATIONS  ALBUTerol    90 MICROgram(s) HFA Inhaler 2 Puff(s) Inhalation every 6 hours PRN    VITALS:   T(F): 97  HR: 67  BP: 125/76  RR: 18  SpO2: 98%    LABS:                        15.8   6.40  )-----------( 167      ( 11 Feb 2020 05:29 )             49.3     02-11    140  |  105  |  22<H>  ----------------------------<  108<H>  4.8   |  20  |  1.1    Ca    9.4      11 Feb 2020 05:29  Mg     2.1     02-11    TPro  6.8  /  Alb  4.1  /  TBili  0.3  /  DBili  <0.2  /  AST  13  /  ALT  13  /  AlkPhos  59  02-11          Creatine Kinase, Serum: 74 U/L (02-11-20 @ 05:29)  Troponin T, Serum: <0.01 ng/mL (02-11-20 @ 05:29)  Creatine Kinase, Serum: 69 U/L (02-10-20 @ 20:04)  Troponin T, Serum: <0.01 ng/mL (02-10-20 @ 20:04)  Troponin T, Serum: <0.01 ng/mL (02-10-20 @ 14:50)      CARDIAC MARKERS ( 11 Feb 2020 05:29 )  x     / <0.01 ng/mL / 74 U/L / x     / 3.1 ng/mL  CARDIAC MARKERS ( 10 Feb 2020 20:04 )  x     / <0.01 ng/mL / 69 U/L / x     / 3.0 ng/mL  CARDIAC MARKERS ( 10 Feb 2020 14:50 )  x     / <0.01 ng/mL / x     / x     / x          RADIOLOGY:    PHYSICAL EXAM:  GEN: No acute distress  PULM/CHEST: Decreased breath sounds bilaterally  CVS: Regular rate and rhythm, S1-S2, no murmurs  ABD: Soft, non-tender, non-distended, +BS  EXT: No edema  NEURO: AAOx3

## 2020-02-11 NOTE — PROGRESS NOTE ADULT - ASSESSMENT
68 yo M with PMH significant for HTN, CAD s/p 5 stent placements, h/o MI in 2006 s/p AICD, HLD, COPD, active smoker (1/5 pack per day x > 10 years) present to the ED for Atypical chest pain      # Atypical Chest Pain r/o unstable angina vs ACS  - EKG reviewed q waves v1-v6 with t wave inversion in v4-v6; outpatient EKGs from January and in 2019 was unchanged  - Troponin negative x3  - Recent outpatient echo as per cardiology, no need to repeat  - Has outpatient stress test scheduled soon. Per cardiology can do as outpatient    - Cardiology eval: Dr. Guzman.     # CAD s/p 5 stent placements  # h/o MI in 2006 s/p AICD  - Continue DAPT, statin, metoprolol XL, and lisinopril (at home takes benazapril however non-formulary)  - Per cardiology start trial of Lasix 40 mg daily with potassium chloride    # HTN: Continue Amlodipine    # HLD: Continue Atorvastatin    DVT ppx: heparin  GI ppx: Protonix    Dispo: acute 70 yo M with PMH significant for HTN, CAD s/p 5 stent placements, h/o MI in 2006 s/p AICD, HLD, COPD, active smoker (1/5 pack per day x > 10 years) present to the ED for Atypical chest pain      # Atypical Chest Pain r/o unstable angina vs ACS  - EKG reviewed q waves v1-v6 with t wave inversion in v4-v6; outpatient EKGs from January and in 2019 was unchanged  - Troponin negative x3  - Recent outpatient echo as per cardiology, no need to repeat  - Has outpatient stress test scheduled soon. Per cardiology can do as outpatient    - Cardiology eval: Dr. Guzman.     # CAD s/p 5 stent placements  # h/o MI in 2006 s/p AICD  - Continue DAPT, statin, metoprolol XL, and lisinopril (at home takes benazapril however non-formulary)  - Per cardiology start trial of Lasix 40 mg daily with potassium chloride    # HTN: Continue Amlodipine    # HLD: Continue Atorvastatin    DVT ppx: heparin  GI ppx: Protonix    Attending Attestation    Pt has been seen and examined. Case and Plan discussed at rounds and agree with above  PT is doing well and is s/p CV evaluation. CP resolved. Previous w/up reviewed.   Pt with CAD/AICD for HFrEF [place], MIs admitted for tingling sensation in chest and EKG with Old T Wave Inversions with negative cardiac markers.  Pt is safe to be d/juan home per CV no more w/up in house.   Resume all meds per d/c med rec    Dispo: D/C home today

## 2020-02-11 NOTE — DISCHARGE NOTE PROVIDER - PROVIDER TOKENS
PROVIDER:[TOKEN:[[id number]],SCHEDULEDAPPT:[03/05/2020],SCHEDULEDAPPTTIME:[11:00 AM]],PROVIDER:[TOKEN:[[id number]],FOLLOWUP:[1 week],ESTABLISHEDPATIENT:[T]]

## 2020-02-11 NOTE — DISCHARGE NOTE PROVIDER - CARE PROVIDER_API CALL
Mario Dickson)  Critical Care Medicine; Internal Medicine; Pulmonary Disease; Sleep Medicine  [address] [place] [address]  Phone: [phone]  Fax: [phone]  Scheduled Appointment: 03/05/2020 11:00 AM    Eugenio Woody)  Cardiovascular Disease; Internal Medicine; Interventional Cardiology; Nuclear Cardiology  [address] [place] [address]  Phone: [phone]  Fax: [phone]  Established Patient  Follow Up Time: 1 week

## 2020-02-11 NOTE — CONSULT NOTE ADULT - ASSESSMENT
cp atypical for angina. ce neg x 3, ekg old changes.   continue DAPT and beta blockers.     CAD  old mi and stents  with ischemic cardiomyopathy. continue beta blockers and ace i.   trial of lasix 40 mg once a day and potassium chloride 20 meq/ day.     possible depression. no suicidal tendencies. does not want meds. will see his therapist    likely copd and smoke related chronic lung changes. please make out pt appt with pulm. pt said he will go.    pt can be dcd from cardiac perspective. had echo in office recently and is schedule for stress test soon.     will follow with us out pt.     thank you.

## 2020-02-11 NOTE — DISCHARGE NOTE NURSING/CASE MANAGEMENT/SOCIAL WORK - PATIENT PORTAL LINK FT
You can access the FollowMyHealth Patient Portal offered by [facility] by registering at the following website: [email or web address]. By joining [employer]’s FollowMyHealth portal, you will also be able to view your health information using other applications (apps) compatible with our system.

## 2020-02-11 NOTE — DISCHARGE NOTE PROVIDER - HOSPITAL COURSE
HPI:    68 yo M    PMH significant for HTN, CAD s/p 5 stent placements, h/o MI in 2006 s/p AICD, HLD, active smoker ( 1/2 pack per day x > 10 years)    Other PMH includes COPD    Patient denies history of diabetes, stroke / TIA    History:     Patient presents for the evaluation of chest discomfort. Patient notes he has been having intermittent discomfort since November 2019 that he initially attributed to stress. Patient notes it is more of a discomfort rather than a chest pain like a poking sensation, located on the anterior b/l chest wall, no associated with exertion, occurs at rest, provoking factors include stress, palliative factors none, radiating to b/l arms with paresthesias intermittent in nature. Not associated with n/v/diaphoresis. Patient was unsure if this was his AICD firing ( last time it fired was in 11/2019 ). Patient recently evaluated by cardiologist as outpatient and planned for outpatient stress test. No orthopnea, no dyspnea on exertion, no lower extremity edema. Denies f/chills/ n/v/d/c, palpitations, sob, abdominal or back pain. Admits to generalized fatigue. (10 Feb 2020 17:16)        ----------        # Atypical Chest Pain r/o unstable angina vs ACS    - EKG reviewed q waves v1-v6 with t wave inversion in v4-v6; outpatient EKGs from January and in 2019 was unchanged    - Troponin negative x3    - Recent outpatient echo as per cardiology, no need to repeat    - Has outpatient stress test scheduled soon. Per cardiology can do as outpatient        - Cardiology eval: Dr. Guzman.         # CAD s/p 5 stent placements    # h/o MI in 2006 s/p AICD    - Continue DAPT, statin, metoprolol XL, and lisinopril (at home takes benazapril however non-formulary)    - Per cardiology start trial of Lasix 40 mg daily with potassium chloride        # HTN: Continue Amlodipine        # HLD: Continue Atorvastatin

## 2020-02-11 NOTE — CONSULT NOTE ADULT - SUBJECTIVE AND OBJECTIVE BOX
ZUNILDA DENNIS 69y (1950) Male    Daily     Daily Weight in k.1 (2020 05:22)    Patient is a 69y old  Male who presents with a chief complaint of Chest discomfort (10 Feb 2020 17:16)      HPI:  70 yo M  PMH significant for HTN, CAD s/p 5 stent placements, h/o MI in  s/p AICD, HLD, active smoker ( 1/2 pack per day x > 10 years)  Other PMH includes COPD  Patient denies history of diabetes, stroke / TIA  History:   Patient presents for the evaluation of chest discomfort. Patient notes he has been having intermittent discomfort since 2019 that he initially attributed to stress. Patient notes it is more of a discomfort rather than a chest pain like a poking sensation, located on the anterior b/l chest wall, no associated with exertion, occurs at rest, provoking factors include stress, palliative factors none, radiating to b/l arms with paresthesias intermittent in nature. Not associated with n/v/diaphoresis. Patient was unsure if this was his AICD firing ( last time it fired was in 2019 ). Patient recently evaluated by cardiologist as outpatient and planned for outpatient stress test. No orthopnea, no dyspnea on exertion, no lower extremity edema. Denies f/chills/ n/v/d/c, palpitations, sob, abdominal or back pain. Admits to generalized fatigue. (10 Feb 2020 17:16)    Cardiology addendum   above reviewed pt interviewed. and examined.   he has been under a lot of stress lately due to his marital issues. he admits to being depressed and has depression like symptoms. had seen a therapist after  about 6 years ago. is back to smokingx 10 years.   c/o pain/ discomfort, pricking in nature. localized to one area of infraclavicular region. has tingling in both arms but discomfort is there all the time and does not go away and gets worse occasionally. has exertional dyspnea from time to time. and ET is one block. no edema. no orthopnea no pnd. no cough. no fever no chills.   currently feels ok.       PAST MEDICAL & SURGICAL HISTORY:  HTN (hypertension)  Hypercholesteremia  CAD (coronary artery disease): HAD CARDIAC CATH WITH STENTS   Myocardial infarction:   CAD (coronary artery disease)  COPD (chronic obstructive pulmonary disease)  History of coronary artery stent placement  CAD (coronary artery disease): CARDIAC CATH   AICD       FAMILY HISTORY:  Stroke (Father)      Home Medications:  amLODIPine 5 mg oral tablet: 1 tab(s) orally once a day (10 Feb 2020 17:27)  aspirin 81 mg oral tablet, chewable: 1 tab(s) orally once a day (10 Feb 2020 17:)  atorvastatin 40 mg oral tablet: 1 tab(s) orally once a day (10 Feb 2020 17:27)  benazepril 5 mg oral tablet: 1 tab(s) orally once a day (10 Feb 2020 17:27)  Metoprolol Succinate ER 50 mg oral tablet, extended release: 1 tab(s) orally once a day (10 Feb 2020 17:27)  Nitrostat 0.3 mg sublingual tablet: 1 tab(s) sublingual every 5 minutes, As Needed (10 Feb 2020 17:27)  Plavix 75 mg oral tablet: 1 tab(s) orally once a day (10 Feb 2020 17:27)  Ventolin HFA 90 mcg/inh inhalation aerosol: 2 puff(s) inhaled 4 times a day, As Needed (10 Feb 2020 17:)      REVIEW OF SYSTEMS:    CONSTITUTIONAL: No weakness, fevers or chills  EYES/ENT: No visual changes;  No vertigo or throat pain   NECK: No pain or stiffness  RESPIRATORY: see HPI  CARDIOVASCULAR: see HPI  GASTROINTESTINAL: No abdominal or epigastric pain. No nausea, vomiting, or hematemesis; No diarrhea or constipation. No melena or hematochezia.  GENITOURINARY: No dysuria, frequency or hematuria  NEUROLOGICAL: No numbness or weakness  SKIN: No itching, rashes    ON EXAM:  Vital Signs Last 24 Hrs  T(C): 36.1 (2020 05:22), Max: 37.1 (10 Feb 2020 18:43)  T(F): 97 (2020 05:22), Max: 98.7 (10 Feb 2020 18:43)  HR: 67 (2020 05:22) (64 - 80)  BP: 125/76 (2020 05:22) (113/58 - 125/76)  BP(mean): --  RR: 18 (2020 05:22) (18 - 18)  SpO2: 98% (10 Feb 2020 23:54) (98% - 98%)    GENERAL: NAD  SKIN: No rashes or lesions  HEAD:  Atraumatic, Normocephalic  EYES:  conjunctiva and sclera clear  ENMT: Moist mucous membranes  NECK: Supple, No JVD  CHEST/LUNG: decreased air entry b/l  HEART: S1, S2 appreciated. Rate and Rythm are regular.  ABDOMEN/GI: Soft, Nontender, Nondistended; Bowel sounds present  EXTREMITIES:  2+ Peripheral Pulses. No edema  NERVOUS SYSTEM:  Alert & Oriented X3, Good concentration  mood appears depressed    ekg is sr vr 76/min qtc 416 ms. old AS and AL mi with t inversions in Lateral leads - are old.  LABS:    CARDIAC MARKERS ( 2020 05:29 )  x     / <0.01 ng/mL / 74 U/L / x     / 3.1 ng/mL  CARDIAC MARKERS ( 10 Feb 2020 20:04 )  x     / <0.01 ng/mL / 69 U/L / x     / 3.0 ng/mL  CARDIAC MARKERS ( 10 Feb 2020 14:50 )  x     / <0.01 ng/mL / x     / x     / x                                  15.8   6.40  )-----------( 167      ( 2020 05:29 )             49.3       CBC Full  -  ( 2020 05:29 )  WBC Count : 6.40 K/uL  RBC Count : 5.75 M/uL  Hemoglobin : 15.8 g/dL  Hematocrit : 49.3 %  Platelet Count - Automated : 167 K/uL  Mean Cell Volume : 85.7 fL  Mean Cell Hemoglobin : 27.5 pg  Mean Cell Hemoglobin Concentration : 32.0 g/dL  Auto Neutrophil # : 4.08 K/uL  Auto Lymphocyte # : 1.45 K/uL  Auto Monocyte # : 0.67 K/uL  Auto Eosinophil # : 0.12 K/uL  Auto Basophil # : 0.06 K/uL  Auto Neutrophil % : 63.7 %  Auto Lymphocyte % : 22.7 %  Auto Monocyte % : 10.5 %  Auto Eosinophil % : 1.9 %  Auto Basophil % : 0.9 %      11    140  |  105  |  22<H>  ----------------------------<  108<H>  4.8   |  20  |  1.1    Ca    9.4      2020 05:29  Mg     2.1     -    TPro  6.8  /  Alb  4.1  /  TBili  0.3  /  DBili  <0.2  /  AST  13  /  ALT  13  /  AlkPhos  59        MEDICATIONS  (STANDING):  albuterol/ipratropium for Nebulization 3 milliLiter(s) Nebulizer every 6 hours  amLODIPine   Tablet 5 milliGRAM(s) Oral daily  aspirin  chewable 81 milliGRAM(s) Oral daily  atorvastatin 40 milliGRAM(s) Oral at bedtime  chlorhexidine 4% Liquid 1 Application(s) Topical <User Schedule>  clopidogrel Tablet 75 milliGRAM(s) Oral daily  heparin  Injectable 5000 Unit(s) SubCutaneous every 8 hours  lisinopril 5 milliGRAM(s) Oral daily  metoprolol succinate ER 50 milliGRAM(s) Oral daily  pantoprazole    Tablet 40 milliGRAM(s) Oral before breakfast    MEDICATIONS  (PRN):  ALBUTerol    90 MICROgram(s) HFA Inhaler 2 Puff(s) Inhalation every 6 hours PRN Shortness of Breath and/or Wheezing      20 @ 08:19

## 2020-02-11 NOTE — DISCHARGE NOTE PROVIDER - NSDCFUSCHEDAPPT_GEN_ALL_CORE_FT
ZUNILDA DENNIS ; 02/12/2020 ; [facility] ZUNILDA DENNIS ; 02/12/2020 ; [facility]

## 2020-02-11 NOTE — DISCHARGE NOTE PROVIDER - NSDCCPCAREPLAN_GEN_ALL_CORE_FT
PRINCIPAL DISCHARGE DIAGNOSIS  Diagnosis: Chest pain  Assessment and Plan of Treatment: You presented with chest tingling. 3 sets of cardiac enzymes were normal. EKG was compared to your prior outpatient EKGs and they were with no significant changes. You had a recent echocardiogram and scheduled for stress test. Your symptoms were less likely cardiac related and more likely related to your stress you are experiencing. Please follow up with Dr Guzman in 1 week. Dr Rodriguez recommended that you start a trial of Lasix with potassium.      SECONDARY DISCHARGE DIAGNOSES  Diagnosis: COPD (chronic obstructive pulmonary disease)  Assessment and Plan of Treatment: As per Dr Rodriguez's recommendation, we have made you an appointment with Dr Mario Dickson (lung doctor) for your COPD. Your appointment is on THURSDAY, MARCH 5th, at 11 AM. Please obtain an official REFERRAL from your PRIMARY CARE PROVIDER for insurance purposes.

## 2020-02-12 ENCOUNTER — APPOINTMENT (OUTPATIENT)
Dept: OTOLARYNGOLOGY | Facility: CLINIC | Age: 70
End: 2020-02-12
Payer: MEDICARE

## 2020-02-12 PROCEDURE — 99212 OFFICE O/P EST SF 10 MIN: CPT

## 2020-02-12 NOTE — HISTORY OF PRESENT ILLNESS
[FreeTextEntry1] : 2/12/2020 Patient is following up s/p bilateral partial parotidectomy for benign tumors. patient states he is feeling well. no complaints. no new swelling. snesation is ok on both sides. No salivary issues.

## 2020-02-12 NOTE — PHYSICAL EXAM
[de-identified] : soft surgical area.  [Midline] : trachea located in midline position [Normal] : no rashes

## 2020-02-14 DIAGNOSIS — I25.2 OLD MYOCARDIAL INFARCTION: ICD-10-CM

## 2020-02-14 DIAGNOSIS — I11.0 HYPERTENSIVE HEART DISEASE WITH HEART FAILURE: ICD-10-CM

## 2020-02-14 DIAGNOSIS — F17.210 NICOTINE DEPENDENCE, CIGARETTES, UNCOMPLICATED: ICD-10-CM

## 2020-02-14 DIAGNOSIS — F43.9 REACTION TO SEVERE STRESS, UNSPECIFIED: ICD-10-CM

## 2020-02-14 DIAGNOSIS — E78.5 HYPERLIPIDEMIA, UNSPECIFIED: ICD-10-CM

## 2020-02-14 DIAGNOSIS — Z79.899 OTHER LONG TERM (CURRENT) DRUG THERAPY: ICD-10-CM

## 2020-02-14 DIAGNOSIS — Z79.82 LONG TERM (CURRENT) USE OF ASPIRIN: ICD-10-CM

## 2020-02-14 DIAGNOSIS — R07.89 OTHER CHEST PAIN: ICD-10-CM

## 2020-02-14 DIAGNOSIS — Z73.3 STRESS, NOT ELSEWHERE CLASSIFIED: ICD-10-CM

## 2020-02-14 DIAGNOSIS — Z95.810 PRESENCE OF AUTOMATIC (IMPLANTABLE) CARDIAC DEFIBRILLATOR: ICD-10-CM

## 2020-02-14 DIAGNOSIS — J44.9 CHRONIC OBSTRUCTIVE PULMONARY DISEASE, UNSPECIFIED: ICD-10-CM

## 2020-02-14 DIAGNOSIS — I50.22 CHRONIC SYSTOLIC (CONGESTIVE) HEART FAILURE: ICD-10-CM

## 2020-02-14 DIAGNOSIS — I25.10 ATHEROSCLEROTIC HEART DISEASE OF NATIVE CORONARY ARTERY WITHOUT ANGINA PECTORIS: ICD-10-CM

## 2020-03-24 NOTE — ASU PATIENT PROFILE, ADULT - TEACHING/LEARNING FACTORS IMPACT ABILITY TO LEARN
Sharla is a previously healthy 16 mo girl presenting for suspected clonidine ingestion and upon presentation is HDS and appears at baseline for behavior per Mom.  S/p 1 dose of Narcan in ED for hypotension but otherwise stable.        CNS:    #suspected clonidine ingestion:  Unknown amount of ingestion but pt asymptomatic, s/p Narcan x 1.    - f/u UDS and blood tylenol, ASA, and EtOH levels  - f/u EKG (r/o QT prolongtion or arrhythmia)  - q4h neurochecks with vitals  - s/p activated charcoal    CV:  S/p 1 dose of Narcan in ED for suspected hypotension (BP = 76/50)  - q4h vitals checks and monitor for bradycardia & hypotension  - cardiac monitoring  - may tx bradycardia with atropine (0.02 mg/kg IV with dose range 0.1 mg - 0.5 mg max) and may repeat after 3-5 min to max total dose of 0.04 mg/kg;  If refractory to atropine may use IV dopamine as 2nd line and titrate to response  - may give IVF (LR, NS, or crystalloid) 20 ml/kg    RESP:  ADAM  - satting 98% on RA  - consider naloxone 0.1 mg/kg IV for respiratory depression (may repeat every 1-2 minutes up to 10 mg total dose in children)  - q4h vitals checks as above    FEN/GI:    - on D5NS @ 30 ml/hr  - resume PO feeds as tolerated  - s/p activated charcoal  - CMP reassuring for normal electrolytes    RENAL:    - CMP reassuring for normal Cr  - monitor UOP    HEME/ID:    - CBC not concerning for infection or anemia    Code:  Full  Social:  Mother updated at bedside  Diet:  D5NS IVF and regular diet  Dispo:  Likely discharge tomorrow after overnight observation    Plan discussed with Dr. Abrams.    
Sharla is a previously healthy 16 mo girl presenting for suspected clonidine ingestion and upon presentation is HDS and appears at baseline for behavior per Mom.  S/p 1 dose of Narcan in ED for hypotension but otherwise stable.        CNS:    #suspected clonidine ingestion:  Unknown amount of ingestion but pt asymptomatic, s/p Narcan x 1.    - f/u UDS and blood tylenol, ASA, and EtOH levels  - f/u EKG (r/o QT prolongtion or arrhythmia)  - q4h neurochecks with vitals  - s/p activated charcoal    CV:  S/p 1 dose of Narcan in ED for suspected hypotension (BP = 76/50)  - q4h vitals checks and monitor for bradycardia & hypotension  - cardiac monitoring  - may tx bradycardia with atropine (0.02 mg/kg IV with dose range 0.1 mg - 0.5 mg max) and may repeat after 3-5 min to max total dose of 0.04 mg/kg;  If refractory to atropine may use IV dopamine as 2nd line and titrate to response  - may give IVF (LR, NS, or crystalloid) 20 ml/kg    RESP:  ADAM  - satting 98% on RA  - consider naloxone 0.1 mg/kg IV for respiratory depression (may repeat every 1-2 minutes up to 10 mg total dose in children)  - q4h vitals checks as above    FEN/GI:    - on D5NS @ 30 ml/hr  - resume PO feeds as tolerated  - s/p activated charcoal  - CMP reassuring for normal electrolytes    RENAL:    - CMP reassuring for normal Cr  - monitor UOP    HEME/ID:    - CBC not concerning for infection or anemia    Code:  Full  Social:  Mother updated at bedside  Diet:  D5NS IVF and regular diet  Dispo:  Likely discharge tomorrow after overnight observation    Plan discussed with Dr. Abrams.    
none

## 2020-06-18 NOTE — ED ADULT NURSE NOTE - NSIMPLEMENTINTERV_GEN_ALL_ED
Occupational Therapy: defer    Chart reviewed, consulted with RN. Noted TCD completed this AM indicates \"Left PANCHITO and PCA velocity still elevated but continuing a general downward trend, consistent with decreasing vasospasm (probably mild spasm in both). \"  Will defer for OT evaluation/ mobilization at this time d/t active vasospasm.      Scot Garcia, OTR/L Implemented All Universal Safety Interventions:  [place] to call system. Call bell, personal items and telephone within reach. Instruct patient to call for assistance. Room bathroom lighting operational. Non-slip footwear when patient is off stretcher. Physically safe environment: no spills, clutter or unnecessary equipment. Stretcher in lowest position, wheels locked, appropriate side rails in place.

## 2021-12-15 ENCOUNTER — INPATIENT (INPATIENT)
Facility: HOSPITAL | Age: 71
LOS: 9 days | Discharge: HOME | End: 2021-12-25
Attending: THORACIC SURGERY (CARDIOTHORACIC VASCULAR SURGERY) | Admitting: THORACIC SURGERY (CARDIOTHORACIC VASCULAR SURGERY)
Payer: MEDICARE

## 2021-12-15 VITALS
HEART RATE: 104 BPM | WEIGHT: 169.98 LBS | RESPIRATION RATE: 18 BRPM | SYSTOLIC BLOOD PRESSURE: 132 MMHG | OXYGEN SATURATION: 100 % | HEIGHT: 65 IN | DIASTOLIC BLOOD PRESSURE: 77 MMHG | TEMPERATURE: 97 F
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
## 2021-12-15 DIAGNOSIS — I49.01 VENTRICULAR FIBRILLATION: ICD-10-CM

## 2021-12-15 DIAGNOSIS — D62 ACUTE POSTHEMORRHAGIC ANEMIA: ICD-10-CM

## 2021-12-15 DIAGNOSIS — E78.5 HYPERLIPIDEMIA, UNSPECIFIED: ICD-10-CM

## 2021-12-15 DIAGNOSIS — I25.2 OLD MYOCARDIAL INFARCTION: ICD-10-CM

## 2021-12-15 DIAGNOSIS — E87.6 HYPOKALEMIA: ICD-10-CM

## 2021-12-15 DIAGNOSIS — I25.84 CORONARY ATHEROSCLEROSIS DUE TO CALCIFIED CORONARY LESION: ICD-10-CM

## 2021-12-15 DIAGNOSIS — I47.2 VENTRICULAR TACHYCARDIA: ICD-10-CM

## 2021-12-15 DIAGNOSIS — I10 ESSENTIAL (PRIMARY) HYPERTENSION: ICD-10-CM

## 2021-12-15 DIAGNOSIS — Z45.02 ENCOUNTER FOR ADJUSTMENT AND MANAGEMENT OF AUTOMATIC IMPLANTABLE CARDIAC DEFIBRILLATOR: ICD-10-CM

## 2021-12-15 DIAGNOSIS — D69.6 THROMBOCYTOPENIA, UNSPECIFIED: ICD-10-CM

## 2021-12-15 DIAGNOSIS — Z95.5 PRESENCE OF CORONARY ANGIOPLASTY IMPLANT AND GRAFT: Chronic | ICD-10-CM

## 2021-12-15 DIAGNOSIS — T82.855A STENOSIS OF CORONARY ARTERY STENT, INITIAL ENCOUNTER: ICD-10-CM

## 2021-12-15 DIAGNOSIS — I25.10 ATHEROSCLEROTIC HEART DISEASE OF NATIVE CORONARY ARTERY WITHOUT ANGINA PECTORIS: ICD-10-CM

## 2021-12-15 DIAGNOSIS — Z95.5 PRESENCE OF CORONARY ANGIOPLASTY IMPLANT AND GRAFT: ICD-10-CM
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
## 2021-12-15 DIAGNOSIS — I25.5 ISCHEMIC CARDIOMYOPATHY: ICD-10-CM

## 2021-12-15 DIAGNOSIS — T82.110A BREAKDOWN (MECHANICAL) OF CARDIAC ELECTRODE, INITIAL ENCOUNTER: ICD-10-CM

## 2021-12-15 DIAGNOSIS — I25.10 ATHEROSCLEROTIC HEART DISEASE OF NATIVE CORONARY ARTERY WITHOUT ANGINA PECTORIS: Chronic | ICD-10-CM

## 2021-12-15 DIAGNOSIS — J44.9 CHRONIC OBSTRUCTIVE PULMONARY DISEASE, UNSPECIFIED: ICD-10-CM

## 2021-12-15 LAB
ALBUMIN SERPL ELPH-MCNC: 4.1 G/DL — SIGNIFICANT CHANGE UP (ref 3.5–5.2)
ALP SERPL-CCNC: 69 U/L — SIGNIFICANT CHANGE UP (ref 30–115)
ALT FLD-CCNC: 11 U/L — SIGNIFICANT CHANGE UP (ref 0–41)
ANION GAP SERPL CALC-SCNC: 17 MMOL/L — HIGH (ref 7–14)
AST SERPL-CCNC: 19 U/L — SIGNIFICANT CHANGE UP (ref 0–41)
BASOPHILS # BLD AUTO: 0.03 K/UL — SIGNIFICANT CHANGE UP (ref 0–0.2)
BASOPHILS NFR BLD AUTO: 0.5 % — SIGNIFICANT CHANGE UP (ref 0–1)
BILIRUB SERPL-MCNC: 0.3 MG/DL — SIGNIFICANT CHANGE UP (ref 0.2–1.2)
BUN SERPL-MCNC: 17 MG/DL — SIGNIFICANT CHANGE UP (ref 10–20)
CALCIUM SERPL-MCNC: 9.3 MG/DL — SIGNIFICANT CHANGE UP (ref 8.5–10.1)
CHLORIDE SERPL-SCNC: 104 MMOL/L — SIGNIFICANT CHANGE UP (ref 98–110)
CO2 SERPL-SCNC: 17 MMOL/L — SIGNIFICANT CHANGE UP (ref 17–32)
CREAT SERPL-MCNC: 1 MG/DL — SIGNIFICANT CHANGE UP (ref 0.7–1.5)
EOSINOPHIL # BLD AUTO: 0.09 K/UL — SIGNIFICANT CHANGE UP (ref 0–0.7)
EOSINOPHIL NFR BLD AUTO: 1.6 % — SIGNIFICANT CHANGE UP (ref 0–8)
FLUAV AG NPH QL: NEGATIVE — SIGNIFICANT CHANGE UP
FLUBV AG NPH QL: NEGATIVE — SIGNIFICANT CHANGE UP
GLUCOSE SERPL-MCNC: 106 MG/DL — HIGH (ref 70–99)
HCT VFR BLD CALC: 43.8 % — SIGNIFICANT CHANGE UP (ref 42–52)
HGB BLD-MCNC: 13.8 G/DL — LOW (ref 14–18)
IMM GRANULOCYTES NFR BLD AUTO: 0.3 % — SIGNIFICANT CHANGE UP (ref 0.1–0.3)
LYMPHOCYTES # BLD AUTO: 1.56 K/UL — SIGNIFICANT CHANGE UP (ref 1.2–3.4)
LYMPHOCYTES # BLD AUTO: 27.2 % — SIGNIFICANT CHANGE UP (ref 20.5–51.1)
MCHC RBC-ENTMCNC: 26.9 PG — LOW (ref 27–31)
MCHC RBC-ENTMCNC: 31.5 G/DL — LOW (ref 32–37)
MCV RBC AUTO: 85.4 FL — SIGNIFICANT CHANGE UP (ref 80–94)
MONOCYTES # BLD AUTO: 0.51 K/UL — SIGNIFICANT CHANGE UP (ref 0.1–0.6)
MONOCYTES NFR BLD AUTO: 8.9 % — SIGNIFICANT CHANGE UP (ref 1.7–9.3)
NEUTROPHILS # BLD AUTO: 3.53 K/UL — SIGNIFICANT CHANGE UP (ref 1.4–6.5)
NEUTROPHILS NFR BLD AUTO: 61.5 % — SIGNIFICANT CHANGE UP (ref 42.2–75.2)
NRBC # BLD: 0 /100 WBCS — SIGNIFICANT CHANGE UP (ref 0–0)
NT-PROBNP SERPL-SCNC: 777 PG/ML — HIGH (ref 0–300)
PLATELET # BLD AUTO: 185 K/UL — SIGNIFICANT CHANGE UP (ref 130–400)
POTASSIUM SERPL-MCNC: 4.8 MMOL/L — SIGNIFICANT CHANGE UP (ref 3.5–5)
POTASSIUM SERPL-SCNC: 4.8 MMOL/L — SIGNIFICANT CHANGE UP (ref 3.5–5)
PROT SERPL-MCNC: 6.9 G/DL — SIGNIFICANT CHANGE UP (ref 6–8)
RBC # BLD: 5.13 M/UL — SIGNIFICANT CHANGE UP (ref 4.7–6.1)
RBC # FLD: 16.3 % — HIGH (ref 11.5–14.5)
RSV RNA NPH QL NAA+NON-PROBE: NEGATIVE — SIGNIFICANT CHANGE UP
SARS-COV-2 RNA SPEC QL NAA+PROBE: NEGATIVE — SIGNIFICANT CHANGE UP
SODIUM SERPL-SCNC: 138 MMOL/L — SIGNIFICANT CHANGE UP (ref 135–146)
TROPONIN T SERPL-MCNC: <0.01 NG/ML — SIGNIFICANT CHANGE UP
WBC # BLD: 5.74 K/UL — SIGNIFICANT CHANGE UP (ref 4.8–10.8)
WBC # FLD AUTO: 5.74 K/UL — SIGNIFICANT CHANGE UP (ref 4.8–10.8)

## 2021-12-15 PROCEDURE — 93010 ELECTROCARDIOGRAM REPORT: CPT

## 2021-12-15 PROCEDURE — 99221 1ST HOSP IP/OBS SF/LOW 40: CPT | Mod: GC

## 2021-12-15 PROCEDURE — 71045 X-RAY EXAM CHEST 1 VIEW: CPT | Mod: 26

## 2021-12-15 PROCEDURE — 99285 EMERGENCY DEPT VISIT HI MDM: CPT

## 2021-12-15 RX ORDER — METOPROLOL TARTRATE 50 MG
50 TABLET ORAL DAILY
Refills: 0 | Status: DISCONTINUED | OUTPATIENT
Start: 2021-12-15 | End: 2021-12-20

## 2021-12-15 RX ORDER — CLOPIDOGREL BISULFATE 75 MG/1
75 TABLET, FILM COATED ORAL DAILY
Refills: 0 | Status: DISCONTINUED | OUTPATIENT
Start: 2021-12-15 | End: 2021-12-17

## 2021-12-15 RX ORDER — ENOXAPARIN SODIUM 100 MG/ML
40 INJECTION SUBCUTANEOUS DAILY
Refills: 0 | Status: DISCONTINUED | OUTPATIENT
Start: 2021-12-15 | End: 2021-12-20

## 2021-12-15 RX ORDER — AMLODIPINE BESYLATE 2.5 MG/1
5 TABLET ORAL DAILY
Refills: 0 | Status: DISCONTINUED | OUTPATIENT
Start: 2021-12-15 | End: 2021-12-21

## 2021-12-15 RX ORDER — ASPIRIN/CALCIUM CARB/MAGNESIUM 324 MG
81 TABLET ORAL DAILY
Refills: 0 | Status: DISCONTINUED | OUTPATIENT
Start: 2021-12-15 | End: 2021-12-21

## 2021-12-15 RX ORDER — LISINOPRIL 2.5 MG/1
5 TABLET ORAL DAILY
Refills: 0 | Status: DISCONTINUED | OUTPATIENT
Start: 2021-12-15 | End: 2021-12-20

## 2021-12-15 RX ORDER — IPRATROPIUM/ALBUTEROL SULFATE 18-103MCG
3 AEROSOL WITH ADAPTER (GRAM) INHALATION EVERY 6 HOURS
Refills: 0 | Status: DISCONTINUED | OUTPATIENT
Start: 2021-12-15 | End: 2021-12-21

## 2021-12-15 RX ORDER — PANTOPRAZOLE SODIUM 20 MG/1
40 TABLET, DELAYED RELEASE ORAL
Refills: 0 | Status: DISCONTINUED | OUTPATIENT
Start: 2021-12-15 | End: 2021-12-21

## 2021-12-15 RX ORDER — CHLORHEXIDINE GLUCONATE 213 G/1000ML
1 SOLUTION TOPICAL DAILY
Refills: 0 | Status: DISCONTINUED | OUTPATIENT
Start: 2021-12-15 | End: 2021-12-21

## 2021-12-15 RX ORDER — FUROSEMIDE 40 MG
40 TABLET ORAL DAILY
Refills: 0 | Status: DISCONTINUED | OUTPATIENT
Start: 2021-12-15 | End: 2021-12-21

## 2021-12-15 RX ORDER — ATORVASTATIN CALCIUM 80 MG/1
40 TABLET, FILM COATED ORAL AT BEDTIME
Refills: 0 | Status: DISCONTINUED | OUTPATIENT
Start: 2021-12-15 | End: 2021-12-21

## 2021-12-15 RX ORDER — BUDESONIDE AND FORMOTEROL FUMARATE DIHYDRATE 160; 4.5 UG/1; UG/1
2 AEROSOL RESPIRATORY (INHALATION)
Refills: 0 | Status: DISCONTINUED | OUTPATIENT
Start: 2021-12-15 | End: 2021-12-21

## 2021-12-15 NOTE — H&P ADULT - ATTENDING COMMENTS
HPI:  70 yo male, h/p CAD s.p 5 stents , MI 2006 s.p AICD, HTN, DL, COPD not on home oxygen or bipap presented for AICD firing  Patient felt 3 episodes of AICD firing causing chest discomfort today staring 6 PM; No similar episode in the past  Last time he followed with EP, 8 months ago, he has been told that one of the leads might not be working  Upon ICD interrogation by cardiac fellow V tach 5x s.p shock was detected   ROS negative for syncope, dizziness, headache, fever , chills, N/V  or respiratory symptoms     in ED 97 F 104 bpm 132/77 mmHg 18  % (15 Dec 2021 22:55)    REVIEW OF SYSTEMS: see cc/ HPI   CONSTITUTIONAL: No weakness, fevers or chills  EYES/ENT: No visual changes;  No vertigo or throat pain   NECK: No pain or stiffness  RESPIRATORY: No cough, wheezing, hemoptysis; No shortness of breath  CARDIOVASCULAR: No chest pain or palpitations, (+) AICD fired   GASTROINTESTINAL: No abdominal or epigastric pain. No nausea, vomiting, or hematemesis; No diarrhea or constipation. No melena or hematochezia.  GENITOURINARY: No dysuria, frequency or hematuria  NEUROLOGICAL: No numbness or weakness  SKIN: No itching, rashes    Physical Exam:   General: WN/WD NAD  Neurology: A&Ox3, nonfocal, follows commands  Eyes: PERRLA/ EOMI  ENT/Neck: Neck supple, trachea midline, No JVD  Respiratory: CTA B/L, No wheezing, rales, rhonchi  CV: Normal rate regular rhythm, S1S2, no murmurs, rubs or gallops  Abdominal: Soft, NT, ND +BS,   Extremities: No edema, + peripheral pulses  Skin: No Rashes, Hematoma, Ecchymosis  Incisions: n/a  Tubes: n/a    A/p   VT s/p AICD activation for termination   suspected lead failure   -admit to tele   -EP eval in the AM for possible lead replacement   -keep K > 4 and Mg >2    CAD s/p PCI w/ stents  HTN   Dyslipidemia   -ASA. Plavix, Bblocker, Amlodipine, ACEI    COPD w/ active smoking   -Nicotine replacement PRN   -PRN bronchodilators     Hypokalemia   -supplement K+and monitor BMP    DVT prophylaxis     seen 12/15/ 21 ( note revised )

## 2021-12-15 NOTE — ED ADULT NURSE NOTE - OBJECTIVE STATEMENT
pt states he started yelling at home 30 mins ago and felt his pacemaker fire. denies any chest pain/sob. pt is alert and oriented

## 2021-12-15 NOTE — ED PROVIDER NOTE - PROGRESS NOTE DETAILS
[place] - spoke w/ cardiology fellow; to interrogate AICD. pt w/ [employer] AICD. [place] - spoke w. Dr. Leal; interrogation revealing 5 AICD dirings, w/ episodes of vtach; also showing cracked RV lead: plan: admit to med [facility].

## 2021-12-15 NOTE — ED PROVIDER NOTE - CLINICAL SUMMARY MEDICAL DECISION MAKING FREE TEXT BOX
71 y M HTN, CAD s/p 5 stent placements, h/o MI in 2006 s/p AICD followed by Dr. Woody, HLD, COPD c/o AICD firing. per pt AICD fired 3 consecutive times ~1845. pt was resting at home when he felt 3 consecutive shocks. denies recent sick contacts/ n/v/f/d/sob/cp. pt vaccinated x2 w/ moderna.    appropriate shocks via AICD for V.Tach per interrogation of medtronic device   Telemetry admission   Labs and CXR with patient   Family notified

## 2021-12-15 NOTE — H&P ADULT - NSHPSOCIALHISTORY_GEN_ALL_CORE
30 pack year smoking active smoker few cigarettes per day  drinks wine socially  lives at home  ambulates independently

## 2021-12-15 NOTE — H&P ADULT - ASSESSMENT
70 yo male, h/p CAD s.p 5 stents , MI 2006 s.p AICD, HTN, DL, COPD not on home oxygen or bipap presented for AICD firing    # V tach s.p AICD firing    - consult EP; Follows with Doctor Lex  - ICD interrogated by cardiac fellow : 5 episodes of V Tach 5 episodes of firing  - suspected lead defect  - no need for amiodarone drip ; spoke to cardiac fellow   - tele monitoring  - keep K > 4 and Mg > 2  - c/w Beta blocker  - might need further ischemic work up  - follow up TTE    # CAD s.p PCI 5 stents  # MI 2006 s.p AICD  # Hypertension  # Dyslipidemia    - c/w ASA 81 mg daily  - c/w Plavix 75 mg daily  - c/w Lasix 40 mg daily  - c/w amlodipine 5 mg daily  - takes benazepril 5 mg at home; switch to lisinopril 5 mg daily  - c/w atorvastatin 40 mg daily  - c/w Toprol XL 50 mg daily  - check lipid panel A1c and TSH  - follow up TTE  - DASH TLC diet  - might need further ischemic work up ( Coronary angiography for current V Tach)   - Cardiology consult : Kedar Woody     # COPD - not in exacerbation  # Active smoker    - start Duoneb q 6 PRN  - start Symbicort q 12  - no wheezes on auscultation  - not on home oxygen or Bipap  - counseled about smoke cessation     # DVT ppx lovenox  # GI ppx protonix  # DASH TLC diet  # Full Code  Please verify Med rec In AM

## 2021-12-15 NOTE — ED PROVIDER NOTE - ATTENDING CONTRIBUTION TO CARE
I personally evaluated the patient. I reviewed the Resident’s or Physician Assistant’s note (as assigned above), and agree with the findings and plan except as documented in my note.    71 y M HTN, CAD s/p 5 stent placements, h/o MI in 2006 s/p AICD followed by Dr. Woody, HLD, COPD c/o AICD firing. per pt AICD fired 3 consecutive times ~1845. pt was resting at home when he felt 3 consecutive shocks. denies recent sick contacts/ n/v/f/d/sob/cp. pt vaccinated x2 w/ moderna.

## 2021-12-15 NOTE — ED PROVIDER NOTE - OBJECTIVE STATEMENT
71 y M HTN, CAD s/p 5 stent placements, h/o MI in 2006 s/p AICD, HLD, COPD c/o AICD firing. per pt AICD fired 3 consecutive times ~1845. pt was resting at home when he felt 3 consecutive shocks. denies recent sick contacts/ n/v/f/d/sob/cp. 71 y M HTN, CAD s/p 5 stent placements, h/o MI in 2006 s/p AICD followed by Dr. Meneses, HLD, COPD c/o AICD firing. per pt AICD fired 3 consecutive times ~1845. pt was resting at home when he felt 3 consecutive shocks. denies recent sick contacts/ n/v/f/d/sob/cp. 71 y M HTN, CAD s/p 5 stent placements, h/o MI in 2006 s/p AICD followed by Dr. Woody, HLD, COPD c/o AICD firing. per pt AICD fired 3 consecutive times ~1845. pt was resting at home when he felt 3 consecutive shocks. denies recent sick contacts/ n/v/f/d/sob/cp. pt vaccinated x2 w/ moderna.

## 2021-12-15 NOTE — H&P ADULT - HISTORY OF PRESENT ILLNESS
72 yo male, h/p CAD s.p 5 stents , MI 2006 s.p AICD, HTN, DL, COPD not on home oxygen or bipap presented for AICD firing  Patient felt 3 episodes of AICD firing causing chest discomfort today staring 6 PM; No similar episode in the past  Last time he followed with EP, 8 months ago, he has been told that one of the leads might not be working  Upon ICD interrogation by cardiac fellow V tach 5x s.p shock was detected   ROS negative for syncope, dizziness, headache, fever , chills, N/V  or respiratory symptoms     in ED 97 F 104 bpm 132/77 mmHg 18  %

## 2021-12-15 NOTE — H&P ADULT - NSCORESITESY/N_GEN_A_CORE_RD
Hide Additional Notes?: No No Detail Level: Generalized Detail Level: Detailed Include Location In Plan?: Yes

## 2021-12-15 NOTE — H&P ADULT - NSHPPHYSICALEXAM_GEN_ALL_CORE
PHYSICAL EXAM:      Constitutional: NAD     Neck: no JVD    Respiratory: clear to auscultation b/l    Cardiovascular: regular rate and rhythm no audible murmur     Gastrointestinal: soft , non tender, positive bowel sounds    Extremities: no LE edema     Neurological: grossly intact non focal, alert and oriented x 3    Skin: intact

## 2021-12-15 NOTE — ED PROVIDER NOTE - CARE PLAN
Principal Discharge DX:	AICD discharge   1 Principal Discharge DX:	AICD discharge  Secondary Diagnosis:	Ventricular tachycardia

## 2021-12-15 NOTE — H&P ADULT - NSHPLABSRESULTS_GEN_ALL_CORE
CBC Full  -  ( 15 Dec 2021 19:45 )  WBC Count : 5.74 K/uL  RBC Count : 5.13 M/uL  Hemoglobin : 13.8 g/dL  Hematocrit : 43.8 %  Platelet Count - Automated : 185 K/uL  Mean Cell Volume : 85.4 fL  Mean Cell Hemoglobin : 26.9 pg  Mean Cell Hemoglobin Concentration : 31.5 g/dL  Auto Neutrophil # : 3.53 K/uL  Auto Lymphocyte # : 1.56 K/uL  Auto Monocyte # : 0.51 K/uL  Auto Eosinophil # : 0.09 K/uL  Auto Basophil # : 0.03 K/uL  Auto Neutrophil % : 61.5 %  Auto Lymphocyte % : 27.2 %  Auto Monocyte % : 8.9 %  Auto Eosinophil % : 1.6 %  Auto Basophil % : 0.5 %      12-15    138  |  104  |  17  ----------------------------<  106<H>  4.8   |  17  |  1.0    Ca    9.3      15 Dec 2021 19:45    TPro  6.9  /  Alb  4.1  /  TBili  0.3  /  DBili  x   /  AST  19  /  ALT  11  /  AlkPhos  69  12-15      < from: Xray Chest 1 View- PORTABLE-Urgent (12.15.21 @ 22:26) >    No radiographic evidence of acute cardiopulmonary disease.    Bones as above.    Left-sided permanent pacemaker.

## 2021-12-16 LAB
A1C WITH ESTIMATED AVERAGE GLUCOSE RESULT: 5.7 % — HIGH (ref 4–5.6)
ANION GAP SERPL CALC-SCNC: 14 MMOL/L — SIGNIFICANT CHANGE UP (ref 7–14)
APTT BLD: 33.3 SEC — SIGNIFICANT CHANGE UP (ref 27–39.2)
BASOPHILS # BLD AUTO: 0.03 K/UL — SIGNIFICANT CHANGE UP (ref 0–0.2)
BASOPHILS NFR BLD AUTO: 0.4 % — SIGNIFICANT CHANGE UP (ref 0–1)
BLD GP AB SCN SERPL QL: SIGNIFICANT CHANGE UP
BUN SERPL-MCNC: 15 MG/DL — SIGNIFICANT CHANGE UP (ref 10–20)
CALCIUM SERPL-MCNC: 9.6 MG/DL — SIGNIFICANT CHANGE UP (ref 8.5–10.1)
CHLORIDE SERPL-SCNC: 105 MMOL/L — SIGNIFICANT CHANGE UP (ref 98–110)
CHOLEST SERPL-MCNC: 143 MG/DL — SIGNIFICANT CHANGE UP
CO2 SERPL-SCNC: 20 MMOL/L — SIGNIFICANT CHANGE UP (ref 17–32)
CREAT SERPL-MCNC: 0.8 MG/DL — SIGNIFICANT CHANGE UP (ref 0.7–1.5)
EOSINOPHIL # BLD AUTO: 0.07 K/UL — SIGNIFICANT CHANGE UP (ref 0–0.7)
EOSINOPHIL NFR BLD AUTO: 0.9 % — SIGNIFICANT CHANGE UP (ref 0–8)
ESTIMATED AVERAGE GLUCOSE: 117 MG/DL — HIGH (ref 68–114)
GLUCOSE SERPL-MCNC: 100 MG/DL — HIGH (ref 70–99)
HCT VFR BLD CALC: 46.9 % — SIGNIFICANT CHANGE UP (ref 42–52)
HDLC SERPL-MCNC: 34 MG/DL — LOW
HGB BLD-MCNC: 14.7 G/DL — SIGNIFICANT CHANGE UP (ref 14–18)
IMM GRANULOCYTES NFR BLD AUTO: 0.4 % — HIGH (ref 0.1–0.3)
INR BLD: 1.11 RATIO — SIGNIFICANT CHANGE UP (ref 0.65–1.3)
LIPID PNL WITH DIRECT LDL SERPL: 96 MG/DL — SIGNIFICANT CHANGE UP
LYMPHOCYTES # BLD AUTO: 1.4 K/UL — SIGNIFICANT CHANGE UP (ref 1.2–3.4)
LYMPHOCYTES # BLD AUTO: 17.5 % — LOW (ref 20.5–51.1)
MAGNESIUM SERPL-MCNC: 2 MG/DL — SIGNIFICANT CHANGE UP (ref 1.8–2.4)
MCHC RBC-ENTMCNC: 26.6 PG — LOW (ref 27–31)
MCHC RBC-ENTMCNC: 31.3 G/DL — LOW (ref 32–37)
MCV RBC AUTO: 85 FL — SIGNIFICANT CHANGE UP (ref 80–94)
MONOCYTES # BLD AUTO: 0.53 K/UL — SIGNIFICANT CHANGE UP (ref 0.1–0.6)
MONOCYTES NFR BLD AUTO: 6.6 % — SIGNIFICANT CHANGE UP (ref 1.7–9.3)
NEUTROPHILS # BLD AUTO: 5.94 K/UL — SIGNIFICANT CHANGE UP (ref 1.4–6.5)
NEUTROPHILS NFR BLD AUTO: 74.2 % — SIGNIFICANT CHANGE UP (ref 42.2–75.2)
NON HDL CHOLESTEROL: 109 MG/DL — SIGNIFICANT CHANGE UP
NRBC # BLD: 0 /100 WBCS — SIGNIFICANT CHANGE UP (ref 0–0)
PHOSPHATE SERPL-MCNC: 3.3 MG/DL — SIGNIFICANT CHANGE UP (ref 2.1–4.9)
PLATELET # BLD AUTO: 198 K/UL — SIGNIFICANT CHANGE UP (ref 130–400)
POTASSIUM SERPL-MCNC: 4.4 MMOL/L — SIGNIFICANT CHANGE UP (ref 3.5–5)
POTASSIUM SERPL-SCNC: 4.4 MMOL/L — SIGNIFICANT CHANGE UP (ref 3.5–5)
PROTHROM AB SERPL-ACNC: 12.7 SEC — SIGNIFICANT CHANGE UP (ref 9.95–12.87)
RBC # BLD: 5.52 M/UL — SIGNIFICANT CHANGE UP (ref 4.7–6.1)
RBC # FLD: 16.5 % — HIGH (ref 11.5–14.5)
SODIUM SERPL-SCNC: 139 MMOL/L — SIGNIFICANT CHANGE UP (ref 135–146)
TRIGL SERPL-MCNC: 86 MG/DL — SIGNIFICANT CHANGE UP
TSH SERPL-MCNC: 2.7 UIU/ML — SIGNIFICANT CHANGE UP (ref 0.27–4.2)
TSH SERPL-MCNC: 2.73 UIU/ML — SIGNIFICANT CHANGE UP (ref 0.27–4.2)
WBC # BLD: 8 K/UL — SIGNIFICANT CHANGE UP (ref 4.8–10.8)
WBC # FLD AUTO: 8 K/UL — SIGNIFICANT CHANGE UP (ref 4.8–10.8)

## 2021-12-16 PROCEDURE — 93283 PRGRMG EVAL IMPLANTABLE DFB: CPT | Mod: 26

## 2021-12-16 PROCEDURE — 99223 1ST HOSP IP/OBS HIGH 75: CPT

## 2021-12-16 PROCEDURE — 99221 1ST HOSP IP/OBS SF/LOW 40: CPT

## 2021-12-16 PROCEDURE — 99233 SBSQ HOSP IP/OBS HIGH 50: CPT

## 2021-12-16 PROCEDURE — 93306 TTE W/DOPPLER COMPLETE: CPT | Mod: 26

## 2021-12-16 PROCEDURE — 93458 L HRT ARTERY/VENTRICLE ANGIO: CPT | Mod: 26

## 2021-12-16 RX ORDER — AMIODARONE HYDROCHLORIDE 400 MG/1
400 TABLET ORAL
Refills: 0 | Status: DISCONTINUED | OUTPATIENT
Start: 2021-12-16 | End: 2021-12-21

## 2021-12-16 RX ORDER — SODIUM CHLORIDE 9 MG/ML
1000 INJECTION INTRAMUSCULAR; INTRAVENOUS; SUBCUTANEOUS
Refills: 0 | Status: DISCONTINUED | OUTPATIENT
Start: 2021-12-16 | End: 2021-12-21

## 2021-12-16 RX ADMIN — LISINOPRIL 5 MILLIGRAM(S): 2.5 TABLET ORAL at 06:12

## 2021-12-16 RX ADMIN — AMLODIPINE BESYLATE 5 MILLIGRAM(S): 2.5 TABLET ORAL at 06:27

## 2021-12-16 RX ADMIN — PANTOPRAZOLE SODIUM 40 MILLIGRAM(S): 20 TABLET, DELAYED RELEASE ORAL at 06:27

## 2021-12-16 RX ADMIN — Medication 81 MILLIGRAM(S): at 11:39

## 2021-12-16 RX ADMIN — ENOXAPARIN SODIUM 40 MILLIGRAM(S): 100 INJECTION SUBCUTANEOUS at 11:22

## 2021-12-16 RX ADMIN — ATORVASTATIN CALCIUM 40 MILLIGRAM(S): 80 TABLET, FILM COATED ORAL at 21:43

## 2021-12-16 RX ADMIN — AMIODARONE HYDROCHLORIDE 400 MILLIGRAM(S): 400 TABLET ORAL at 19:40

## 2021-12-16 RX ADMIN — CLOPIDOGREL BISULFATE 75 MILLIGRAM(S): 75 TABLET, FILM COATED ORAL at 11:39

## 2021-12-16 RX ADMIN — Medication 40 MILLIGRAM(S): at 06:27

## 2021-12-16 RX ADMIN — SODIUM CHLORIDE 75 MILLILITER(S): 9 INJECTION INTRAMUSCULAR; INTRAVENOUS; SUBCUTANEOUS at 18:18

## 2021-12-16 RX ADMIN — BUDESONIDE AND FORMOTEROL FUMARATE DIHYDRATE 2 PUFF(S): 160; 4.5 AEROSOL RESPIRATORY (INHALATION) at 21:42

## 2021-12-16 RX ADMIN — Medication 50 MILLIGRAM(S): at 06:12

## 2021-12-16 NOTE — CONSULT NOTE ADULT - SUBJECTIVE AND OBJECTIVE BOX
Surgeon: /Manpreet/Valencia    Consult requesting by: Dr. Mayo  PMD: Dr. Aparicio    HISTORY OF PRESENT ILLNESS:  70 yo male, h/p CAD s.p 5 stents , MI 2006 s.p AICD, HTN, DL, COPD not on home oxygen or bipap presented for AICD firing.  Patient felt 3 episodes of AICD firing causing chest discomfort today staring 6 PM; No similar episode in the past.  Last time he followed with EP, 8 months ago, he has been told that one of the leads might not be working.  Upon ICD interrogation by cardiac fellow V tach 5x s.p shock was detected.  ROS negative for syncope, dizziness, headache, fever , chills, N/V  or respiratory symptoms.  Subsequent cardiac catheterization on 12/16/2021 revealed severe 2vCAD.    NYHA functional class    [ ] Class I (no limitation) [ ] Class II (slight limitation) [ ] Class III (marked limitation) [ ] Class IV (symptoms at rest)    PAST MEDICAL & SURGICAL HISTORY:  COPD (chronic obstructive pulmonary disease)    CAD (coronary artery disease)    Myocardial infarction  2006    CAD (coronary artery disease)  HAD CARDIAC CATH WITH STENTS 2006    Hypercholesteremia    HTN (hypertension)    CAD (coronary artery disease)  CARDIAC CATH 2006  AICD 2015    History of coronary artery stent placement      MEDICATIONS  (STANDING):  aMIOdarone    Tablet 400 milliGRAM(s) Oral two times a day  amLODIPine   Tablet 5 milliGRAM(s) Oral daily  aspirin enteric coated 81 milliGRAM(s) Oral daily  atorvastatin 40 milliGRAM(s) Oral at bedtime  budesonide 160 MICROgram(s)/formoterol 4.5 MICROgram(s) Inhaler 2 Puff(s) Inhalation two times a day  chlorhexidine 4% Liquid 1 Application(s) Topical daily  clopidogrel Tablet 75 milliGRAM(s) Oral daily  enoxaparin Injectable 40 milliGRAM(s) SubCutaneous daily  furosemide    Tablet 40 milliGRAM(s) Oral daily  lisinopril 5 milliGRAM(s) Oral daily  metoprolol succinate ER 50 milliGRAM(s) Oral daily  pantoprazole    Tablet 40 milliGRAM(s) Oral before breakfast  sodium chloride 0.9%. 1000 milliLiter(s) (75 mL/Hr) IV Continuous <Continuous>    MEDICATIONS  (PRN):  albuterol/ipratropium for Nebulization 3 milliLiter(s) Nebulizer every 6 hours PRN Bronchospasm    Antiplatelet therapy:      Plavix                      Last dose/amt: 12/16/2021 - 75 mg    Allergies    No Known Allergies        SOCIAL HISTORY:  Smoker: [ ] Yes  [ ] No        PACK YEARS:                         WHEN QUIT?  ETOH use: [ ] Yes  [ ] No              FREQUENCY / QUANTITY:  Ilicit Drug use:  [ ] Yes  [ ] No  Occupation:  Lives with:  Assisted device use:  5 meter walk test: 1____sec, 2____sec, 3___sec  FAMILY HISTORY:  Stroke (Father)        Review of Systems  CONSTITUTIONAL:  Fevers[ ] chills[ ] sweats[ ] fatigue[ ] weight loss[ ] weight gain [ ]            NEGATIVE [X ]   NEURO:  parathesias[ ] seizures [ ]  syncope [ ]  confusion [ ]                                                       NEGATIVE[ X]   EYES: glasses[ ]  blurry vision[ ]  discharge[ ] pain[ ] glaucoma [ ]                                                 NEGATIVE[X ]   ENMT:  difficulty hearing [ ]  vertigo[ ]  dysphagia[ ] epistaxis[ ] recent dental work [ ]           NEGATIVE[ X]   CV:  chest pain[ ] palpitations[ ] GOMES [ ] diaphoresis [ ]                                                                  NEGATIVE[ X]   RESPIRATORY:  wheezing[ ] SOB[ ] cough [ ] sputum[ ] hemoptysis[ ]                                          NEGATIVE[ ]   GI:  nausea[ ]  vomiting [ ]  diarrhea[ ] constipation [ ] melena [ ]                                             NEGATIVE[ X]   : hematuria[ ]  dysuria[ ] urgency[ ] incontinence[ ]                                                                   NEGATIVE[ X]   MUSCULOSKELETAL:  arthritis[ ]  joint swelling [ ] muscle weakness [ ] Hx vein stripping [ ]   NEGATIVE[X ]   SKIN/BREAST:  rash[ ] itching [ ]  hair loss[ ] masses[ ]                                                                    NEGATIVE[ X]   PSYCH:  dementia [ ] depression [ ] anxiety[ ]                                                                                     NEGATIVE[X ]   HEME/LYMPH:  bruises easily[ ] enlarged lymph nodes[ ] tender lymph nodes[ ]                     NEGATIVE[ X]   ENDOCRINE:  cold intolerance[ ] heat intolerance[ ] polydipsia[ ]                                                NEGATIVE[ X]     PHYSICAL EXAM  Vital Signs Last 24 Hrs  T(C): 36.6 (16 Dec 2021 11:00), Max: 36.8 (15 Dec 2021 23:43)  T(F): 97.9 (16 Dec 2021 11:00), Max: 98.3 (15 Dec 2021 23:43)  HR: 80 (16 Dec 2021 11:00) (52 - 90)  BP: 129/63 (16 Dec 2021 11:00) (115/78 - 133/75)  BP(mean): 86 (16 Dec 2021 11:00) (86 - 93)  RR: 18 (16 Dec 2021 11:00) (17 - 18)  SpO2: 96% (16 Dec 2021 11:00) (96% - 100%)  Right arm bp:                       Left arm bp:    Constitutional: NAD   Neck: no JVD  Respiratory: clear to auscultation b/l  Cardiovascular: regular rate and rhythm no audible murmur   Gastrointestinal: soft , non tender, positive bowel sounds  Extremities: no LE edema   Neurological: grossly intact non focal, alert and oriented x 3  Skin: intact      LABS:                        14.7   8.00  )-----------( 198      ( 16 Dec 2021 04:30 )             46.9     12-16    139  |  105  |  15  ----------------------------<  100<H>  4.4   |  20  |  0.8    Ca    9.6      16 Dec 2021 04:30  Phos  3.3     12-16  Mg     2.0     12-16    TPro  6.9  /  Alb  4.1  /  TBili  0.3  /  DBili  x   /  AST  19  /  ALT  11  /  AlkPhos  69  12-15    PT/INR - ( 16 Dec 2021 04:30 )   PT: 12.70 sec;   INR: 1.11 ratio         PTT - ( 16 Dec 2021 04:30 )  PTT:33.3 sec    CARDIAC MARKERS ( 15 Dec 2021 19:45 )  x     / <0.01 ng/mL / x     / x     / x        Cardiac Cath:  Coronary Dominance: Right dominate    LM: Mild Disease    LAD: Mild prox disease. 90% stenosis in mid LAD. 99% in stent stenosis distal LAD. D1 90% lesion, D2 99% lesion at the ostium. RCA supplied collateral to Apical LAD and diag    CX: Prox Moderate in-stent diffuse disease, 90% calcified lesion in distal Lcx. Moderate disease OM1    RCA: Mild diffuse disease       LVEDP: 9 mmHg       TTE / LISA:  Summary:   1. Left ventricular ejection fraction, by visual estimation, is 30 to   35%.   2. Multiple left ventricular regional wall motion abnormalities exist.   See wall motion findings.   3. Spectral Doppler shows impaired relaxation pattern of left   ventricular myocardial filling (Grade I diastolic dysfunction).   4. Mildly enlarged left atrium.   5. Normal right atrial size.   6. Mild mitral valve regurgitation.   7. Mild thickening of the anterior and posterior mitral valve leaflets.   8. Moderate mitral annular calcification.   9. Mild tricuspid regurgitation.  10. Mild aortic regurgitation.  11. Sclerotic aortic valve with normal opening.      Recommendation: (Procedures/Evaluations)  CT HEAD Non-Contrast:[  ]  CT Chest without contrast [ X ]  Echocadiography :[ X ]  Carotid Duplex :[ X ]  PFT : Simple PFT [ X ]  Full [ ]  JUJU/PVR: [ ]  Renal Consult [ ]  Pulmonary Consult: [ ]   Vascular Consult [ ]    Dental Consult [ ]   Hem-Onc Consult [ ]   GI Consult [ ]   Other Consultations :    STS Risk Score:  Discussed with patient/HCP [x ] Yes  [ ] No        Assessment/ Plan:                 Surgeon: /Manpreet/Valencia    Consult requesting by: Dr. Mayo  PMD: Dr. Aparicio    HISTORY OF PRESENT ILLNESS:  72 yo male, h/p CAD s.p 5 stents , MI 2006 s.p AICD, HTN, DL, COPD not on home oxygen or bipap presented for AICD firing.  Patient felt 3 episodes of AICD firing causing chest discomfort today staring 6 PM; No similar episode in the past.  Last time he followed with EP, 8 months ago, he has been told that one of the leads might not be working.  Upon ICD interrogation by cardiac fellow V tach 5x s.p shock was detected.  ROS negative for syncope, dizziness, headache, fever , chills, N/V  or respiratory symptoms.  Subsequent cardiac catheterization on 12/16/2021 revealed severe 2vCAD.    NYHA functional class    [ ] Class I (no limitation) [ ] Class II (slight limitation) [ ] Class III (marked limitation) [ ] Class IV (symptoms at rest)    PAST MEDICAL & SURGICAL HISTORY:  COPD (chronic obstructive pulmonary disease)    CAD (coronary artery disease)    Myocardial infarction  2006    CAD (coronary artery disease)  HAD CARDIAC CATH WITH STENTS 2006    Hypercholesteremia    HTN (hypertension)    CAD (coronary artery disease)  CARDIAC CATH 2006  AICD 2015    History of coronary artery stent placement      MEDICATIONS  (STANDING):  aMIOdarone    Tablet 400 milliGRAM(s) Oral two times a day  amLODIPine   Tablet 5 milliGRAM(s) Oral daily  aspirin enteric coated 81 milliGRAM(s) Oral daily  atorvastatin 40 milliGRAM(s) Oral at bedtime  budesonide 160 MICROgram(s)/formoterol 4.5 MICROgram(s) Inhaler 2 Puff(s) Inhalation two times a day  chlorhexidine 4% Liquid 1 Application(s) Topical daily  clopidogrel Tablet 75 milliGRAM(s) Oral daily  enoxaparin Injectable 40 milliGRAM(s) SubCutaneous daily  furosemide    Tablet 40 milliGRAM(s) Oral daily  lisinopril 5 milliGRAM(s) Oral daily  metoprolol succinate ER 50 milliGRAM(s) Oral daily  pantoprazole    Tablet 40 milliGRAM(s) Oral before breakfast  sodium chloride 0.9%. 1000 milliLiter(s) (75 mL/Hr) IV Continuous <Continuous>    MEDICATIONS  (PRN):  albuterol/ipratropium for Nebulization 3 milliLiter(s) Nebulizer every 6 hours PRN Bronchospasm    Antiplatelet therapy:      Plavix                      Last dose/amt: 12/16/2021 - 75 mg    Allergies    No Known Allergies        SOCIAL HISTORY:  Smoker: [x ] Yes  [ ] No      5-10 cigs/day x 50 years - ACTIVE SMOKER  ETOH use: [ ] Yes  [ x] No              FREQUENCY / QUANTITY:  Ilicit Drug use:  [ ] Yes  [x] No  Occupation: Retired  Lives with: Wife  Assisted device use: None  5 meter walk test: 1___5_sec, 2___5_sec, 3__5_sec  FAMILY HISTORY:  Stroke (Father)      Review of Systems  CONSTITUTIONAL: No weakness, fevers or chills  EYES: No visual changes  ENT: No vertigo or throat pain   NECK: No pain or stiffness  RESPIRATORY: No cough, wheezing, hemoptysis; No shortness of breath  CARDIOVASCULAR: No chest pain or palpitations  GASTROINTESTINAL: No abdominal or epigastric pain. No nausea, vomiting, or hematemesis; No diarrhea or constipation. No melena or hematochezia.  GENITOURINARY: No dysuria, frequency or hematuria  NEUROLOGICAL: No numbness or weakness  SKIN: No itching, no rashes  MSK: FROM x4    PHYSICAL EXAM  Vital Signs Last 24 Hrs  T(C): 36.6 (16 Dec 2021 11:00), Max: 36.8 (15 Dec 2021 23:43)  T(F): 97.9 (16 Dec 2021 11:00), Max: 98.3 (15 Dec 2021 23:43)  HR: 80 (16 Dec 2021 11:00) (52 - 90)  BP: 129/63 (16 Dec 2021 11:00) (115/78 - 133/75)  BP(mean): 86 (16 Dec 2021 11:00) (86 - 93)  RR: 18 (16 Dec 2021 11:00) (17 - 18)  SpO2: 96% (16 Dec 2021 11:00) (96% - 100%)    NEURO: patient is awake , alert and oriented  GEN: Not in acute distress  NECK: no thyroid enlargement, no JVD  LUNGS: Clear to auscultation bilaterally   CARDIOVASCULAR: S1/S2 present, RRR , no murmurs or rubs, no carotid bruits,  + PP bilaterally  ABD: Soft, non-tender, non-distended, +BS  EXT: No GABE  SKIN: Intact    LABS:                        14.7   8.00  )-----------( 198      ( 16 Dec 2021 04:30 )             46.9     12-16    139  |  105  |  15  ----------------------------<  100<H>  4.4   |  20  |  0.8    Ca    9.6      16 Dec 2021 04:30  Phos  3.3     12-16  Mg     2.0     12-16    TPro  6.9  /  Alb  4.1  /  TBili  0.3  /  DBili  x   /  AST  19  /  ALT  11  /  AlkPhos  69  12-15    PT/INR - ( 16 Dec 2021 04:30 )   PT: 12.70 sec;   INR: 1.11 ratio         PTT - ( 16 Dec 2021 04:30 )  PTT:33.3 sec    CARDIAC MARKERS ( 15 Dec 2021 19:45 )  x     / <0.01 ng/mL / x     / x     / x        Cardiac Cath:  Coronary Dominance: Right dominate    LM: Mild Disease    LAD: Mild prox disease. 90% stenosis in mid LAD. 99% in stent stenosis distal LAD. D1 90% lesion, D2 99% lesion at the ostium. RCA supplied collateral to Apical LAD and diag    CX: Prox Moderate in-stent diffuse disease, 90% calcified lesion in distal Lcx. Moderate disease OM1    RCA: Mild diffuse disease       LVEDP: 9 mmHg       TTE / LISA:  Summary:   1. Left ventricular ejection fraction, by visual estimation, is 30 to   35%.   2. Multiple left ventricular regional wall motion abnormalities exist.   See wall motion findings.   3. Spectral Doppler shows impaired relaxation pattern of left   ventricular myocardial filling (Grade I diastolic dysfunction).   4. Mildly enlarged left atrium.   5. Normal right atrial size.   6. Mild mitral valve regurgitation.   7. Mild thickening of the anterior and posterior mitral valve leaflets.   8. Moderate mitral annular calcification.   9. Mild tricuspid regurgitation.  10. Mild aortic regurgitation.  11. Sclerotic aortic valve with normal opening.      Recommendation: (Procedures/Evaluations)  CT HEAD Non-Contrast:[  ]  CT Chest without contrast [ X ]  Echocadiography :[ X ]  Carotid Duplex :[ X ]  PFT : Simple PFT [ X ]  Full [ ]  JUJU/PVR: [ ]  Renal Consult [ ]  Pulmonary Consult: [ ]   Vascular Consult [ ]    Dental Consult [ ]   Hem-Onc Consult [ ]   GI Consult [ ]   Other Consultations :    STS Risk Score:  Discussed with patient/HCP [x ] Yes  [ ] No  Procedure: Isolated CAB  Risk of Mortality: 1.707%  Renal Failure: 0.572%  Permanent Stroke: 0.671%  Prolonged Ventilation: 8.799%  DSW Infection: 0.140%  Reoperation: 3.708%  Morbidity or Mortality: 11.487%  Short Length of Stay: 46.020%  Long Length of Stay: 5.018%        Assessment/ Plan:  71M here with 2vCAD.  D/C Plavix.  Pre-op workup for possible CABG next week possibly mon or tue.

## 2021-12-16 NOTE — PROGRESS NOTE ADULT - ATTENDING COMMENTS
Patient seen and examined independently. Agree with resident note/ history / physical exam and plan of care with following exceptions/additions/updates. Case discussed with patient/pt decision maker, house-staff and nursing. My notes supersedes the resident's notes in case of discrepancies.    # AICD firing, and Vtach, one of the leads is not working per EP.  hx of CAD, he will need ischemic workup and poss lead change.   started on amio per EP , cont other meds.     #Progress Note Handoff  Pending (specify): cardio consult, poss cath, will need aicd lead change  Family discussion: dw pt   Disposition, home

## 2021-12-16 NOTE — CONSULT NOTE ADULT - SUBJECTIVE AND OBJECTIVE BOX
Cardiologist: Dr. Woody/Nereida    HPI:  72 yo male, h/p CAD s.p 5 stents , MI 2006 s.p AICD, HTN, DL, COPD not on home oxygen or bipap presented for AICD firing  Patient felt 3 episodes of AICD firing causing chest discomfort today staring 6 PM; No similar episode in the past  Last time he followed with EP, 8 months ago, he has been told that one of the leads might not be working  Upon ICD interrogation by cardiac fellow V tach 5x s.p shock was detected   ROS negative for syncope, dizziness, headache, fever , chills, N/V  or respiratory symptoms     in ED 97 F 104 bpm 132/77 mmHg 18  % (15 Dec 2021 22:55)    EPS called for AICD shocks.      PAST MEDICAL & SURGICAL HISTORY  COPD (chronic obstructive pulmonary disease)    CAD (coronary artery disease)    Myocardial infarction  2006    CAD (coronary artery disease)  HAD CARDIAC CATH WITH STENTS 2006    Hypercholesteremia    HTN (hypertension)    CAD (coronary artery disease)  CARDIAC CATH 2006  AICD 2015    History of coronary artery stent placement        FAMILY HISTORY:  FAMILY HISTORY:  Stroke (Father)      [ ] no pertinent family history of premature cardiovascular disease in first degree relatives.  Mother:   Father:   Siblings:     SOCIAL HISTORY:  [x]smoker  []Alcohol  []Drug    ALLERGIES:  No Known Allergies      MEDICATIONS:  MEDICATIONS  (STANDING):  amLODIPine   Tablet 5 milliGRAM(s) Oral daily  aspirin enteric coated 81 milliGRAM(s) Oral daily  atorvastatin 40 milliGRAM(s) Oral at bedtime  budesonide 160 MICROgram(s)/formoterol 4.5 MICROgram(s) Inhaler 2 Puff(s) Inhalation two times a day  chlorhexidine 4% Liquid 1 Application(s) Topical daily  clopidogrel Tablet 75 milliGRAM(s) Oral daily  enoxaparin Injectable 40 milliGRAM(s) SubCutaneous daily  furosemide    Tablet 40 milliGRAM(s) Oral daily  lisinopril 5 milliGRAM(s) Oral daily  metoprolol succinate ER 50 milliGRAM(s) Oral daily  pantoprazole    Tablet 40 milliGRAM(s) Oral before breakfast    MEDICATIONS  (PRN):  albuterol/ipratropium for Nebulization 3 milliLiter(s) Nebulizer every 6 hours PRN Bronchospasm      HOME MEDICATIONS:  Home Medications:  amLODIPine 5 mg oral tablet: 1 tab(s) orally once a day (10 Feb 2020 17:27)  aspirin 81 mg oral tablet, chewable: 1 tab(s) orally once a day (10 Feb 2020 17:27)  atorvastatin 40 mg oral tablet: 1 tab(s) orally once a day (10 Feb 2020 17:27)  benazepril 5 mg oral tablet: 1 tab(s) orally once a day (10 Feb 2020 17:27)  Metoprolol Succinate ER 50 mg oral tablet, extended release: 1 tab(s) orally once a day (10 Feb 2020 17:27)  Nitrostat 0.3 mg sublingual tablet: 1 tab(s) sublingual every 5 minutes, As Needed (10 Feb 2020 17:27)  Plavix 75 mg oral tablet: 1 tab(s) orally once a day (10 Feb 2020 17:27)  Ventolin HFA 90 mcg/inh inhalation aerosol: 2 puff(s) inhaled 4 times a day, As Needed (10 Feb 2020 17:27)      VITALS:   T(F): 98 (12-16 @ 07:43), Max: 98.3 (12-15 @ 23:43)  HR: 67 (12-16 @ 07:43) (52 - 104)  BP: 115/78 (12-16 @ 07:43) (115/78 - 133/75)  BP(mean): 93 (12-16 @ 07:43) (90 - 93)  RR: 17 (12-16 @ 07:43) (17 - 18)  SpO2: 98% (12-16 @ 07:43) (97% - 100%)    I&O's Summary      REVIEW OF SYSTEMS:  CONSTITUTIONAL: No weakness, fevers or chills  EYES: No visual changes  ENT: No vertigo or throat pain   NECK: No pain or stiffness  RESPIRATORY: No cough, wheezing, hemoptysis; No shortness of breath  CARDIOVASCULAR: No chest pain or palpitations  GASTROINTESTINAL: No abdominal or epigastric pain. No nausea, vomiting, or hematemesis; No diarrhea or constipation. No melena or hematochezia.  GENITOURINARY: No dysuria, frequency or hematuria  NEUROLOGICAL: No numbness or weakness  SKIN: No itching, no rashes  MSK: FROM x4    PHYSICAL EXAM:  NEURO: patient is awake , alert and oriented  GEN: Not in acute distress  NECK: no thyroid enlargement, no JVD  LUNGS: Clear to auscultation bilaterally   CARDIOVASCULAR: S1/S2 present, RRR , no murmurs or rubs, no carotid bruits,  + PP bilaterally  ABD: Soft, non-tender, non-distended, +BS  EXT: No GABE  SKIN: Intact    LABS:                        13.8   5.74  )-----------( 185      ( 15 Dec 2021 19:45 )             43.8     12-15    138  |  104  |  17  ----------------------------<  106<H>  4.8   |  17  |  1.0    Ca    9.3      15 Dec 2021 19:45    TPro  6.9  /  Alb  4.1  /  TBili  0.3  /  DBili  x   /  AST  19  /  ALT  11  /  AlkPhos  69  12-15      Troponin T, Serum: <0.01 ng/mL (12-15-21 @ 19:45)    CARDIAC MARKERS ( 15 Dec 2021 19:45 )  x     / <0.01 ng/mL / x     / x     / x            Troponin trend:    Serum Pro-Brain Natriuretic Peptide: 777 pg/mL (12-15-21 @ 19:45)          RADIOLOGY:  -CXR:  -TTE:  -CCTA:  -STRESS TEST:  -CATHETERIZATION:    ECG:  < from: 12 Lead ECG (12.15.21 @ 19:01) >  Ventricular Rate 118 BPM    Atrial Rate 118 BPM    P-R Interval 132 ms    QRS Duration 82 ms    Q-T Interval 326 ms    QTC Calculation(Bazett) 456 ms    P Axis 58 degrees    R Axis -32 degrees    T Axis 78 degrees    Diagnosis Line Sinus tachycardia with Premature atrial complexes with Aberrant conduction  Left axis deviation  Inferior infarct , age undetermined  Anterolateral infarct , age undetermined  Abnormal ECG    < end of copied text >      TELEMETRY EVENTS: NSR    [employer] AICD interrogation: 5 episodes of VT/VF, 3 failed therapies. RV lead failure since 3/1/21

## 2021-12-16 NOTE — PATIENT PROFILE ADULT - FALL HARM RISK - UNIVERSAL INTERVENTIONS
Bed in lowest position, wheels locked, appropriate side rails in place/Call bell, personal items and telephone in reach/Instruct patient to call for assistance before getting out of bed or chair/Non-slip footwear when patient is out of bed/[place] to call system/Physically safe environment - no spills, clutter or unnecessary equipment/Purposeful Proactive Rounding/Room/bathroom lighting operational, light cord in reach

## 2021-12-16 NOTE — CONSULT NOTE ADULT - ATTENDING COMMENTS
complex patient  severly ill  multiple ICD firing for VF  recommend Amiodarone  mg q12h  ICD RV lead compromised; no pacing  Recommend cardiac cath  Will discuss with Dr. Case Laser extraction and re-implant

## 2021-12-16 NOTE — PROGRESS NOTE ADULT - SUBJECTIVE AND OBJECTIVE BOX
----------Daily Progress Note----------    HISTORY OF PRESENT ILLNESS:  Patient is a 71y old Male who presents with a chief complaint of AICD firing (16 Dec 2021 08:59)    Currently admitted to medicine with the primary diagnosis of AICD discharge       Today is hospital day 1d.     INTERVAL HOSPITAL COURSE / OVERNIGHT EVENTS:    Patient was examined and seen at bedside. This morning he is resting comfortably in bed and reports no new issues or overnight events.     Review of Systems: Otherwise unremarkable     <<<<<PAST MEDICAL & SURGICAL HISTORY>>>>>  COPD (chronic obstructive pulmonary disease)    CAD (coronary artery disease)    Myocardial infarction  2006    CAD (coronary artery disease)  HAD CARDIAC CATH WITH STENTS 2006    Hypercholesteremia    HTN (hypertension)    CAD (coronary artery disease)  CARDIAC CATH 2006  AICD 2015    History of coronary artery stent placement      ALLERGIES  No Known Allergies      Home Medications:  amLODIPine 5 mg oral tablet: 1 tab(s) orally once a day (10 Feb 2020 17:27)  aspirin 81 mg oral tablet, chewable: 1 tab(s) orally once a day (10 Feb 2020 17:27)  atorvastatin 40 mg oral tablet: 1 tab(s) orally once a day (10 Feb 2020 17:27)  benazepril 5 mg oral tablet: 1 tab(s) orally once a day (10 Feb 2020 17:27)  Metoprolol Succinate ER 50 mg oral tablet, extended release: 1 tab(s) orally once a day (10 Feb 2020 17:27)  Nitrostat 0.3 mg sublingual tablet: 1 tab(s) sublingual every 5 minutes, As Needed (10 Feb 2020 17:27)  Plavix 75 mg oral tablet: 1 tab(s) orally once a day (10 Feb 2020 17:27)  Ventolin HFA 90 mcg/inh inhalation aerosol: 2 puff(s) inhaled 4 times a day, As Needed (10 Feb 2020 17:27)        MEDICATIONS  STANDING MEDICATIONS  amLODIPine   Tablet 5 milliGRAM(s) Oral daily  aspirin enteric coated 81 milliGRAM(s) Oral daily  atorvastatin 40 milliGRAM(s) Oral at bedtime  budesonide 160 MICROgram(s)/formoterol 4.5 MICROgram(s) Inhaler 2 Puff(s) Inhalation two times a day  chlorhexidine 4% Liquid 1 Application(s) Topical daily  clopidogrel Tablet 75 milliGRAM(s) Oral daily  enoxaparin Injectable 40 milliGRAM(s) SubCutaneous daily  furosemide    Tablet 40 milliGRAM(s) Oral daily  lisinopril 5 milliGRAM(s) Oral daily  metoprolol succinate ER 50 milliGRAM(s) Oral daily  pantoprazole    Tablet 40 milliGRAM(s) Oral before breakfast    PRN MEDICATIONS  albuterol/ipratropium for Nebulization 3 milliLiter(s) Nebulizer every 6 hours PRN    VITALS:  T(F): 98  HR: 67  BP: 115/78  RR: 17  SpO2: 98%    <<<<<LABS>>>>>                        14.7   8.00  )-----------( 198      ( 16 Dec 2021 04:30 )             46.9     12-16    139  |  105  |  15  ----------------------------<  100<H>  4.4   |  20  |  0.8    Ca    9.6      16 Dec 2021 04:30  Phos  3.3     12-16  Mg     2.0     12-16    TPro  6.9  /  Alb  4.1  /  TBili  0.3  /  DBili  x   /  AST  19  /  ALT  11  /  AlkPhos  69  12-15    PT/INR - ( 16 Dec 2021 04:30 )   PT: 12.70 sec;   INR: 1.11 ratio         PTT - ( 16 Dec 2021 04:30 )  PTT:33.3 sec      Troponin T, Serum: <0.01 ng/mL (12-15-21 @ 19:45)    [id number]  CARDIAC MARKERS ( 15 Dec 2021 19:45 )  x     / <0.01 ng/mL / x     / x     / x            <<<<<RADIOLOGY>>>>>    < from: Xray Chest 1 View- PORTABLE-Urgent (12.15.21 @ 22:26) >    INTERPRETATION:  Clinical History / Reason for exam: Pain    Comparison : Chest radiograph February 10, 2020.    Technique/Positioning: Adequate.    Findings:    Support devices: Left-sided permanent pacemaker.    Cardiac/mediastinum/hilum: Unremarkable.    Lung parenchyma/Pleura: Within normal limits.    Skeleton/soft tissues: Degenerative changes of the axial skeleton.    Impression:    No radiographic evidence of acute cardiopulmonary disease.    Bones as above.    Left-sided permanent pacemaker.    < end of copied text >      <<<<<PHYSICAL EXAM>>>>>  GENERAL: NAD, Resting comfortably in bed.  PULMONARY: Clear to auscultation bilaterally. No rales, rhonchi, or wheezing.  CARDIOVASCULAR: Regular rate and rhythm, S1-S2, no murmurs  GASTROINTESTINAL: Soft, non-tender, non-distended, no guarding.  SKIN/EXTREMITIES: No LE edema b/l  NEUROLOGIC: AAOX3      -----------------------------------------------------------------------------------------------------------------------------------------------------------------------------------------------

## 2021-12-16 NOTE — PROGRESS NOTE ADULT - ASSESSMENT
72 yo male, h/p CAD s.p 5 stents , MI 2006 s.p AICD, HTN, DL, COPD not on home oxygen or bipap presented for AICD firing    V tach with AICD firing    - EP consult--->may need lead replacement, f/u TTE  - ICD interrogated by cardiac fellow--->5 episodes of V Tach 5 episodes of firing  - possible lead defect  - keep K > 4 and Mg > 2  -continue toprol, lisinopril  - might need further ischemic work up    CAD s/p 5 stents/HTN/DLD  -continue home meds; home benazepril switched to lisinopril  -HDL 34 on lipid panel; f/u TSH  -consider further ischemic workup  - pt follows with Dr. Woody, will call office today    COPD not currently in exacerbation, current smoker  -continue duonebs, symbicort  - no wheezes on auscultation  - not on home oxygen or Bipap      DVT ppx: lovenox  GI ppx: protonix  Diet: DASH  Full code

## 2021-12-16 NOTE — CHART NOTE - NSCHARTNOTEFT_GEN_A_CORE
PRE-OP DIAGNOSIS:  V fib s/p AICD shock      PROCEDURE:     [X] Coronary Angiogram     [X] LHC     [] LVG     [] RHC     [] Intervention (see below)         PHYSICIAN:  Dr. Mayo    ASSISTANT:  Dr. Soria, Dr. Roche       PROCEDURE DESCRIPTION:     Consent:      [X] Patient     [] Family Member     []  Used        Anesthesia:     X General     [] Sedation     [X] Local        Access & Closure:     [X] Fr Radial Artery     [] Fr Femoral Artery     [] Fr Femoral Vein     [] Fr Brachial Vein       IV Contrast: 50 mL        Intervention: None      Implants: None       FINDINGS:     Coronary Dominance: Right dominate      LM: Mild Disease    LAD: Mild prox disease. 90% stenosis in mid LAD. 99% in stent stenosis distal LAD. D1 90% lesion, D2 99% lesion at the ostium. RCA supplied collateral to Apical LAD and diag    CX: Prox Moderate in-stent diffuse disease, 99% calcified lesion in distal Lcx. Moderate disease OM1    RCA: Mild diffuse disease       LVEDP: 9 mmHg       ESTIMATED BLOOD LOSS: < 10 mL        CONDITION:     [X] Good     [] Fair     [] Critical        SPECIMEN REMOVED: N/A       POST-OP DIAGNOSIS:      [] Normal Coronary Angiogram     [] Mild Coronary Artery Disease (< 50% stenosis)     [X] 2 Vessel Coronary Artery Disease        PLAN OF CARE:     [] D/C Home Today     [X] Return to In-patient bed     [] Admit for observation     [] Return for Staged Procedure     [X] CT Surgery Consult     [] Medications: Aspirin, Plavix, Metoprolol, Atorvastatin and Amiodarone    [] IV Fluids: NS 75cc/hr x 6 hrs PRE-OP DIAGNOSIS:  V fib s/p AICD shock      PROCEDURE:     [X] Coronary Angiogram     [X] LHC     [] LVG     [] RHC     [] Intervention (see below)         PHYSICIAN:  Dr. Mayo    ASSISTANT:  Dr. Soria, Dr. Roche       PROCEDURE DESCRIPTION:     Consent:      [X] Patient     [] Family Member     []  Used        Anesthesia:     X General     [] Sedation     [X] Local        Access & Closure:     [X] Fr Radial Artery     [] Fr Femoral Artery     [] Fr Femoral Vein     [] Fr Brachial Vein       IV Contrast: 50 mL        Intervention: None      Implants: None       FINDINGS:     Coronary Dominance: Right dominate      LM: Mild Disease    LAD: Mild prox disease. 90% stenosis in mid LAD. 99% in stent stenosis distal LAD. D1 90% lesion, D2 99% lesion at the ostium. RCA supplied collateral to Apical LAD and diag    CX: Prox Moderate in-stent diffuse disease, 90% calcified lesion in distal Lcx. Moderate disease OM1    RCA: Mild diffuse disease       LVEDP: 9 mmHg       ESTIMATED BLOOD LOSS: < 10 mL        CONDITION:     [X] Good     [] Fair     [] Critical        SPECIMEN REMOVED: N/A       POST-OP DIAGNOSIS:      [] Normal Coronary Angiogram     [] Mild Coronary Artery Disease (< 50% stenosis)     [X] 2 Vessel Coronary Artery Disease        PLAN OF CARE:     [] D/C Home Today     [X] Return to In-patient bed     [] Admit for observation     [] Return for Staged Procedure     [X] CT Surgery Consult     [] Medications: Aspirin, Plavix, Metoprolol, Atorvastatin and Amiodarone    [] IV Fluids: NS 75cc/hr x 6 hrs

## 2021-12-16 NOTE — CONSULT NOTE ADULT - ASSESSMENT
AICD shocks  CAD s/p PCI  HTN  DLD    -patient aware that there was an issue with RV lead; states his EP adjusted volatages (increased)   -get TTE  -will liklely need lead replacement  -cardio eval Dr. Woody group  -may need ischemic w/u Cards: Dr Woody  EP Dr Padron      AICD shocks  CAD s/p PCI  HTN  DLD    -patient aware that there was an issue with RV lead; states his EP adjusted volatages (increased)   -get TTE  -will liklely need lead replacement  -cardio eval Dr. Woody group  -may need ischemic w/u    ADDENDUM  Device interrogated, RV lead not pacing, unable to get threshold  Shocking function is working properly  PT is not pacemaker dependent,  0.4% AP 12%    Recommend Amiodarone loading PO:  400mg BID x1 week, then 200BID x2 weeks, then 200mg daily  Daily EKG  Ischemic eval  recommended RV lead extraction and replacement  will follow

## 2021-12-17 LAB
ALBUMIN SERPL ELPH-MCNC: 4.3 G/DL — SIGNIFICANT CHANGE UP (ref 3.5–5.2)
ALP SERPL-CCNC: 68 U/L — SIGNIFICANT CHANGE UP (ref 30–115)
ALT FLD-CCNC: 10 U/L — SIGNIFICANT CHANGE UP (ref 0–41)
ANION GAP SERPL CALC-SCNC: 20 MMOL/L — HIGH (ref 7–14)
AST SERPL-CCNC: 18 U/L — SIGNIFICANT CHANGE UP (ref 0–41)
BILIRUB SERPL-MCNC: 0.4 MG/DL — SIGNIFICANT CHANGE UP (ref 0.2–1.2)
BUN SERPL-MCNC: 18 MG/DL — SIGNIFICANT CHANGE UP (ref 10–20)
CALCIUM SERPL-MCNC: 9.2 MG/DL — SIGNIFICANT CHANGE UP (ref 8.5–10.1)
CHLORIDE SERPL-SCNC: 104 MMOL/L — SIGNIFICANT CHANGE UP (ref 98–110)
CO2 SERPL-SCNC: 15 MMOL/L — LOW (ref 17–32)
CREAT SERPL-MCNC: 1.1 MG/DL — SIGNIFICANT CHANGE UP (ref 0.7–1.5)
GLUCOSE SERPL-MCNC: 117 MG/DL — HIGH (ref 70–99)
HCT VFR BLD CALC: 46.6 % — SIGNIFICANT CHANGE UP (ref 42–52)
HGB BLD-MCNC: 14.4 G/DL — SIGNIFICANT CHANGE UP (ref 14–18)
MAGNESIUM SERPL-MCNC: 2 MG/DL — SIGNIFICANT CHANGE UP (ref 1.8–2.4)
MCHC RBC-ENTMCNC: 26.2 PG — LOW (ref 27–31)
MCHC RBC-ENTMCNC: 30.9 G/DL — LOW (ref 32–37)
MCV RBC AUTO: 84.9 FL — SIGNIFICANT CHANGE UP (ref 80–94)
NRBC # BLD: 0 /100 WBCS — SIGNIFICANT CHANGE UP (ref 0–0)
PLATELET # BLD AUTO: 209 K/UL — SIGNIFICANT CHANGE UP (ref 130–400)
POTASSIUM SERPL-MCNC: 4.7 MMOL/L — SIGNIFICANT CHANGE UP (ref 3.5–5)
POTASSIUM SERPL-SCNC: 4.7 MMOL/L — SIGNIFICANT CHANGE UP (ref 3.5–5)
PROT SERPL-MCNC: 7.1 G/DL — SIGNIFICANT CHANGE UP (ref 6–8)
RBC # BLD: 5.49 M/UL — SIGNIFICANT CHANGE UP (ref 4.7–6.1)
RBC # FLD: 16.5 % — HIGH (ref 11.5–14.5)
SODIUM SERPL-SCNC: 139 MMOL/L — SIGNIFICANT CHANGE UP (ref 135–146)
WBC # BLD: 8.69 K/UL — SIGNIFICANT CHANGE UP (ref 4.8–10.8)
WBC # FLD AUTO: 8.69 K/UL — SIGNIFICANT CHANGE UP (ref 4.8–10.8)

## 2021-12-17 PROCEDURE — 71250 CT THORAX DX C-: CPT | Mod: 26

## 2021-12-17 PROCEDURE — 99233 SBSQ HOSP IP/OBS HIGH 50: CPT

## 2021-12-17 PROCEDURE — 93880 EXTRACRANIAL BILAT STUDY: CPT | Mod: 26

## 2021-12-17 PROCEDURE — 99232 SBSQ HOSP IP/OBS MODERATE 35: CPT

## 2021-12-17 PROCEDURE — 74176 CT ABD & PELVIS W/O CONTRAST: CPT | Mod: 26

## 2021-12-17 PROCEDURE — 94060 EVALUATION OF WHEEZING: CPT | Mod: 26

## 2021-12-17 RX ADMIN — ENOXAPARIN SODIUM 40 MILLIGRAM(S): 100 INJECTION SUBCUTANEOUS at 12:01

## 2021-12-17 RX ADMIN — AMLODIPINE BESYLATE 5 MILLIGRAM(S): 2.5 TABLET ORAL at 05:05

## 2021-12-17 RX ADMIN — BUDESONIDE AND FORMOTEROL FUMARATE DIHYDRATE 2 PUFF(S): 160; 4.5 AEROSOL RESPIRATORY (INHALATION) at 08:40

## 2021-12-17 RX ADMIN — CLOPIDOGREL BISULFATE 75 MILLIGRAM(S): 75 TABLET, FILM COATED ORAL at 12:00

## 2021-12-17 RX ADMIN — Medication 40 MILLIGRAM(S): at 05:05

## 2021-12-17 RX ADMIN — LISINOPRIL 5 MILLIGRAM(S): 2.5 TABLET ORAL at 05:04

## 2021-12-17 RX ADMIN — AMIODARONE HYDROCHLORIDE 400 MILLIGRAM(S): 400 TABLET ORAL at 17:24

## 2021-12-17 RX ADMIN — PANTOPRAZOLE SODIUM 40 MILLIGRAM(S): 20 TABLET, DELAYED RELEASE ORAL at 08:40

## 2021-12-17 RX ADMIN — AMIODARONE HYDROCHLORIDE 400 MILLIGRAM(S): 400 TABLET ORAL at 05:05

## 2021-12-17 RX ADMIN — Medication 50 MILLIGRAM(S): at 05:04

## 2021-12-17 RX ADMIN — Medication 81 MILLIGRAM(S): at 12:01

## 2021-12-17 RX ADMIN — ATORVASTATIN CALCIUM 40 MILLIGRAM(S): 80 TABLET, FILM COATED ORAL at 21:24

## 2021-12-17 RX ADMIN — BUDESONIDE AND FORMOTEROL FUMARATE DIHYDRATE 2 PUFF(S): 160; 4.5 AEROSOL RESPIRATORY (INHALATION) at 20:20

## 2021-12-17 NOTE — PROGRESS NOTE ADULT - ATTENDING COMMENTS
patient seen and examined independently on morning rounds for the first time today in ED3, chart reviewed and discussed with the medicine resident and on interdisciplinary rounds and agree with the above resident progress note with the following addendum:    no overnight events---d/w patient and son bedside- plan for CABG with CT surgery next week (cardiac cath 12/16 showed 2 vessel disease not amenable to pci)--no chest pain or sob    PE:  GEN-NAD, AAOx3  PULM- Clear to auscultation bilaterally, fair air entry  CVS- +s1/s2 RRR   GI- soft NT ND +bs, no rebound, no guarding  EXT- no edema    labs/radiology reviewed    a/p:  # AICD firing & Vtach- CAD h/o pci x 5 stents previously and MI  -EPS following-  one of the leads is not working per EPs and will need possible lead change  -cath with signficant CAD---CT Surgery following for likely CABG next week  -will f/u if aicd lead defect and cabg can be scheduled together---f/u with CT surgery  -continue current management---started on amio    DVT/GI  ppx  guarded prognosis    FULL CODE    #Progress Note Handoff  Pending (specify): CT surgery for CABG next week  Family discussion: dw pt and son bedside   Disposition, home when medically stable

## 2021-12-17 NOTE — PROGRESS NOTE ADULT - SUBJECTIVE AND OBJECTIVE BOX
PLANNED OPERATIVE PROCEDURE(s):                HD #                       SURGEON(s): DERIK Herr    HISTORY OF PRESENT ILLNESS:  72 yo male, h/p CAD s.p 5 stents , MI 2006 s.p AICD, HTN, DL, COPD not on home oxygen or bipap presented for AICD firing.  Patient felt 3 episodes of AICD firing causing chest discomfort today staring 6 PM; No similar episode in the past.  Last time he followed with EP, 8 months ago, he has been told that one of the leads might not be working.  Upon ICD interrogation by cardiac fellow V tach 5x s.p shock was detected.  ROS negative for syncope, dizziness, headache, fever , chills, N/V  or respiratory symptoms.  Subsequent cardiac catheterization on 12/16/2021 revealed severe 2vCAD.    SUBJECTIVE ASSESSMENT: 71y Male patient seen and examined at bedside.    Vital Signs Last 24 Hrs  T(F): 97.7 (17 Dec 2021 08:01), Max: 97.7 (17 Dec 2021 08:01)  HR: 85 (17 Dec 2021 08:01) (64 - 85)  BP: 96/65 (17 Dec 2021 08:01) (96/65 - 128/76)  BP(mean): 76 (17 Dec 2021 08:01) (76 - 95)  RR: 18 (17 Dec 2021 08:01) (18 - 18)  SpO2: 98% (17 Dec 2021 04:00) (98% - 98%)    I&O's Detail    16 Dec 2021 07:01  -  17 Dec 2021 07:00  --------------------------------------------------------  IN:    Oral Fluid: 340 mL  Total IN: 340 mL    OUT:    Voided (mL): 2100 mL  Total OUT: 2100 mL    Net: I&O's Detail    16 Dec 2021 07:01  -  17 Dec 2021 07:00  --------------------------------------------------------  Total NET: -1760 mL    Physical Exam:  General: NAD; A&Ox3  Cardiac: S1/S2, RRR, no murmur, no rubs  Lungs: unlabored respirations, CTA b/l, no wheeze, no rales, no crackles  Abdomen: Soft/NT/ND; positive bowel sounds x 4  Extremities: No edema b/l lower extremities; good capillary refill; no cyanosis; palpable 1+ pedal pulses b/l      LABS:                        14.4   8.69  )-----------( 209      ( 17 Dec 2021 08:00 )             46.6                         14.7   8.00  )-----------( 198      ( 16 Dec 2021 04:30 )             46.9     12-17    139  |  104  |  18  ----------------------------<  117<H>  4.7   |  15<L>  |  1.1  12-16    139  |  105  |  15  ----------------------------<  100<H>  4.4   |  20  |  0.8    Ca    9.2      17 Dec 2021 08:00  Phos  3.3     12-16  Mg     2.0     12-17    TPro  7.1 [6.0 - 8.0]  /  Alb  4.3 [3.5 - 5.2]  /  TBili  0.4 [0.2 - 1.2]  /  DBili  x   /  AST  18 [0 - 41]  /  ALT  10 [0 - 41]  /  AlkPhos  68 [30 - 115]  12-17    PT/INR - ( 16 Dec 2021 04:30 )   PT: ;   INR: 1.11 ratio       PTT - ( 16 Dec 2021 04:30 )  PTT:33.3 sec    A1C with Estimated Average Glucose Result: 5.7 % (12-16-21 @ 04:30)    RADIOLOGY & ADDITIONAL TESTS:  CXR: < from: Xray Chest 1 View- PORTABLE-Urgent (12.15.21 @ 22:26) >  Impression: No radiographic evidence of acute cardiopulmonary disease.  Bones as above.  Left-sided permanent pacemaker.  < end of copied text >     EKG: < from: 12 Lead ECG (12.15.21 @ 19:01) >  Ventricular Rate 118 BPM  Atrial Rate 118 BPM  P-R Interval 132 ms  QRS Duration 82 ms  Q-T Interval 326 ms  QTC Calculation(Bazett) 456 ms  P Axis 58 degrees  R Axis -32 degrees  T Axis 78 degrees  Diagnosis Line Sinus tachycardia with Premature atrial complexes with Aberrant conduction  Left axis deviation  Inferior infarct , age undetermined  Anterolateral infarct , age undetermined  Abnormal ECG  Confirmed by Mainor Fontaine ([id number]) on 12/16/2021 8:12:02 AM  < end of copied text >    TTE: < from: TTE Echo Complete w/o Contrast w/ Doppler (12.16.21 @ 12:04) >  Summary:   1. Left ventricular ejection fraction, by visual estimation, is 30 to   35%.   2. Multiple left ventricular regional wall motion abnormalities exist.   See wall motion findings.   3. Spectral Doppler shows impaired relaxation pattern of left   ventricular myocardial filling (Grade I diastolic dysfunction).   4. Mildly enlarged left atrium.   5. Normal right atrial size.   6. Mild mitral valve regurgitation.   7. Mild thickening of the anterior and posterior mitral valve leaflets.   8. Moderate mitral annular calcification.   9. Mild tricuspid regurgitation.  10. Mild aortic regurgitation.  11. Sclerotic aortic valve with normal opening.    PHYSICIAN INTERPRETATION:  Left Ventricle: The left ventricular internal cavity size is normal. Left   ventricular wall thickness is normal. Left ventricular ejection fraction,   by visual estimation, is 30 to 35%. Spectral Doppler shows impaired   relaxation pattern of left ventricular myocardial filling (Grade I   diastolic dysfunction).      LV Wall Scoring:  The apical septal segment, apical anterior segment, and apex are   akinetic. The  mid anteroseptal segment, apical lateral segment, mid anterior segment,   and  apical inferior segment are hypokinetic. All remaining scored segments are  normal.    Right Ventricle: Normal right ventricular size and function.  Left Atrium: Mildly enlarged left atrium.  Right Atrium: Normal right atrial size.  Pericardium: There is no evidence of pericardial effusion.  Mitral Valve: Mild thickening of the anterior and posterior mitral valve   leaflets. There is moderate mitral annular calcification. Mild mitral   valve regurgitation is seen.  Tricuspid Valve: Structurally normal tricuspid valve, with normal leaflet   excursion. Mild tricuspid regurgitation is visualized.  Aortic Valve: The aortic valve was not well visualized. The aortic valve   is trileaflet. No evidence of aortic stenosis. Sclerotic aortic valve   with normal opening. Mild aortic valve regurgitation is seen.  Pulmonic Valve: Structurally normal pulmonic valve, with normal leaflet   excursion. Trace pulmonic valve regurgitation.  Aorta: The aortic root is normal in size and structure.  Venous: The inferior vena cava was normal sized, with respiratory size   variation greater than 50%.  Additional Comments: A pacer wire is visualized in the right ventricle.    < end of copied text >    CT CHEST: < from: CT Chest No Cont (12.17.21 @ 09:23) >  IMPRESSION:    No acute intrathoracic pathology.    Scattered left sided pulmonary nodules measuring up to 0.3 cm, as above.    No significant calcifications involving the ascending thoracic aorta.   Scattered calcifications involving the aortic arch.    Please see separately dictated report CT abdomen and pelvis performed   concurrently for intra-abdominal findings.    < end of copied text >    Cardiac cath: Coronary Dominance: Right dominate    LM: Mild Disease    LAD: Mild prox disease. 90% stenosis in mid LAD. 99% in stent stenosis distal LAD. D1 90% lesion, D2 99% lesion at the ostium. RCA supplied collateral to Apical LAD and diag    CX: Prox Moderate in-stent diffuse disease, 90% calcified lesion in distal Lcx. Moderate disease OM1    RCA: Mild diffuse disease       LVEDP: 9 mmHg       Allergies    No Known Allergies    Intolerances        MEDICATIONS  (STANDING):  aMIOdarone    Tablet 400 milliGRAM(s) Oral two times a day  amLODIPine   Tablet 5 milliGRAM(s) Oral daily  aspirin enteric coated 81 milliGRAM(s) Oral daily  atorvastatin 40 milliGRAM(s) Oral at bedtime  budesonide 160 MICROgram(s)/formoterol 4.5 MICROgram(s) Inhaler 2 Puff(s) Inhalation two times a day  chlorhexidine 4% Liquid 1 Application(s) Topical daily  enoxaparin Injectable 40 milliGRAM(s) SubCutaneous daily  furosemide    Tablet 40 milliGRAM(s) Oral daily  lisinopril 5 milliGRAM(s) Oral daily  metoprolol succinate ER 50 milliGRAM(s) Oral daily  pantoprazole    Tablet 40 milliGRAM(s) Oral before breakfast  sodium chloride 0.9%. 1000 milliLiter(s) (75 mL/Hr) IV Continuous <Continuous>    MEDICATIONS  (PRN):  albuterol/ipratropium for Nebulization 3 milliLiter(s) Nebulizer every 6 hours PRN Bronchospasm    Home Medications:  amLODIPine 5 mg oral tablet: 1 tab(s) orally once a day (10 Feb 2020 17:27)  aspirin 81 mg oral tablet, chewable: 1 tab(s) orally once a day (10 Feb 2020 17:27)  atorvastatin 40 mg oral tablet: 1 tab(s) orally once a day (10 Feb 2020 17:27)  benazepril 5 mg oral tablet: 1 tab(s) orally once a day (10 Feb 2020 17:27)  Metoprolol Succinate ER 50 mg oral tablet, extended release: 1 tab(s) orally once a day (10 Feb 2020 17:27)  Nitrostat 0.3 mg sublingual tablet: 1 tab(s) sublingual every 5 minutes, As Needed (10 Feb 2020 17:27)  Plavix 75 mg oral tablet: 1 tab(s) orally once a day (10 Feb 2020 17:27)  Ventolin HFA 90 mcg/inh inhalation aerosol: 2 puff(s) inhaled 4 times a day, As Needed (10 Feb 2020 17:27)      Assessment/Plan:  71y Male Pre-op for Possible CABG  - Case and plan discussed with CTU Intensivist and CT Surgeon - Dr. Case. STS risk score assessed and discussed risk with patient. Attending note to follow.  - Continue supportive care.    - Continue DVT/GI prophylaxis  - Incentive Spirometry 10 times an hour  - Continue to advance physical activity as tolerated and continue PT/OT as directed  1. CAD: Continue ASA, statin, BB. HOLD PLAVIX for PRE-OP CABG  2. EP: Possible Laser extraction and re-implant          PLANNED OPERATIVE PROCEDURE(s):                HD #                       SURGEON(s): DERIK Herr    HISTORY OF PRESENT ILLNESS:  72 yo male, h/p CAD s.p 5 stents , MI 2006 s.p AICD, HTN, DL, COPD not on home oxygen or bipap presented for AICD firing.  Patient felt 3 episodes of AICD firing causing chest discomfort today staring 6 PM; No similar episode in the past.  Last time he followed with EP, 8 months ago, he has been told that one of the leads might not be working.  Upon ICD interrogation by cardiac fellow V tach 5x s.p shock was detected.  ROS negative for syncope, dizziness, headache, fever , chills, N/V  or respiratory symptoms.  Subsequent cardiac catheterization on 12/16/2021 revealed severe 2vCAD.    SUBJECTIVE ASSESSMENT: 71y Male patient seen and examined at bedside.    Vital Signs Last 24 Hrs  T(F): 97.7 (17 Dec 2021 08:01), Max: 97.7 (17 Dec 2021 08:01)  HR: 85 (17 Dec 2021 08:01) (64 - 85)  BP: 96/65 (17 Dec 2021 08:01) (96/65 - 128/76)  BP(mean): 76 (17 Dec 2021 08:01) (76 - 95)  RR: 18 (17 Dec 2021 08:01) (18 - 18)  SpO2: 98% (17 Dec 2021 04:00) (98% - 98%)    I&O's Detail    16 Dec 2021 07:01  -  17 Dec 2021 07:00  --------------------------------------------------------  IN:    Oral Fluid: 340 mL  Total IN: 340 mL    OUT:    Voided (mL): 2100 mL  Total OUT: 2100 mL    Net: I&O's Detail    16 Dec 2021 07:01  -  17 Dec 2021 07:00  --------------------------------------------------------  Total NET: -1760 mL    Physical Exam:  General: NAD; A&Ox3  Cardiac: S1/S2, RRR, no murmur, no rubs  Lungs: unlabored respirations, CTA b/l, no wheeze, no rales, no crackles  Abdomen: Soft/NT/ND; positive bowel sounds x 4  Extremities: No edema b/l lower extremities; good capillary refill; no cyanosis; palpable 1+ pedal pulses b/l      LABS:                        14.4   8.69  )-----------( 209      ( 17 Dec 2021 08:00 )             46.6                         14.7   8.00  )-----------( 198      ( 16 Dec 2021 04:30 )             46.9     12-17    139  |  104  |  18  ----------------------------<  117<H>  4.7   |  15<L>  |  1.1  12-16    139  |  105  |  15  ----------------------------<  100<H>  4.4   |  20  |  0.8    Ca    9.2      17 Dec 2021 08:00  Phos  3.3     12-16  Mg     2.0     12-17    TPro  7.1 [6.0 - 8.0]  /  Alb  4.3 [3.5 - 5.2]  /  TBili  0.4 [0.2 - 1.2]  /  DBili  x   /  AST  18 [0 - 41]  /  ALT  10 [0 - 41]  /  AlkPhos  68 [30 - 115]  12-17    PT/INR - ( 16 Dec 2021 04:30 )   PT: ;   INR: 1.11 ratio       PTT - ( 16 Dec 2021 04:30 )  PTT:33.3 sec    A1C with Estimated Average Glucose Result: 5.7 % (12-16-21 @ 04:30)    RADIOLOGY & ADDITIONAL TESTS:  CXR: < from: Xray Chest 1 View- PORTABLE-Urgent (12.15.21 @ 22:26) >  Impression: No radiographic evidence of acute cardiopulmonary disease.  Bones as above.  Left-sided permanent pacemaker.  < end of copied text >     EKG: < from: 12 Lead ECG (12.15.21 @ 19:01) >  Ventricular Rate 118 BPM  Atrial Rate 118 BPM  P-R Interval 132 ms  QRS Duration 82 ms  Q-T Interval 326 ms  QTC Calculation(Bazett) 456 ms  P Axis 58 degrees  R Axis -32 degrees  T Axis 78 degrees  Diagnosis Line Sinus tachycardia with Premature atrial complexes with Aberrant conduction  Left axis deviation  Inferior infarct , age undetermined  Anterolateral infarct , age undetermined  Abnormal ECG  Confirmed by Mainor Fontaine ([id number]) on 12/16/2021 8:12:02 AM  < end of copied text >    TTE: < from: TTE Echo Complete w/o Contrast w/ Doppler (12.16.21 @ 12:04) >  Summary:   1. Left ventricular ejection fraction, by visual estimation, is 30 to   35%.   2. Multiple left ventricular regional wall motion abnormalities exist.   See wall motion findings.   3. Spectral Doppler shows impaired relaxation pattern of left   ventricular myocardial filling (Grade I diastolic dysfunction).   4. Mildly enlarged left atrium.   5. Normal right atrial size.   6. Mild mitral valve regurgitation.   7. Mild thickening of the anterior and posterior mitral valve leaflets.   8. Moderate mitral annular calcification.   9. Mild tricuspid regurgitation.  10. Mild aortic regurgitation.  11. Sclerotic aortic valve with normal opening.    PHYSICIAN INTERPRETATION:  Left Ventricle: The left ventricular internal cavity size is normal. Left   ventricular wall thickness is normal. Left ventricular ejection fraction,   by visual estimation, is 30 to 35%. Spectral Doppler shows impaired   relaxation pattern of left ventricular myocardial filling (Grade I   diastolic dysfunction).      LV Wall Scoring:  The apical septal segment, apical anterior segment, and apex are   akinetic. The  mid anteroseptal segment, apical lateral segment, mid anterior segment,   and  apical inferior segment are hypokinetic. All remaining scored segments are  normal.    Right Ventricle: Normal right ventricular size and function.  Left Atrium: Mildly enlarged left atrium.  Right Atrium: Normal right atrial size.  Pericardium: There is no evidence of pericardial effusion.  Mitral Valve: Mild thickening of the anterior and posterior mitral valve   leaflets. There is moderate mitral annular calcification. Mild mitral   valve regurgitation is seen.  Tricuspid Valve: Structurally normal tricuspid valve, with normal leaflet   excursion. Mild tricuspid regurgitation is visualized.  Aortic Valve: The aortic valve was not well visualized. The aortic valve   is trileaflet. No evidence of aortic stenosis. Sclerotic aortic valve   with normal opening. Mild aortic valve regurgitation is seen.  Pulmonic Valve: Structurally normal pulmonic valve, with normal leaflet   excursion. Trace pulmonic valve regurgitation.  Aorta: The aortic root is normal in size and structure.  Venous: The inferior vena cava was normal sized, with respiratory size   variation greater than 50%.  Additional Comments: A pacer wire is visualized in the right ventricle.    < end of copied text >    CT CHEST: < from: CT Chest No Cont (12.17.21 @ 09:23) >  IMPRESSION:    No acute intrathoracic pathology.    Scattered left sided pulmonary nodules measuring up to 0.3 cm, as above.    No significant calcifications involving the ascending thoracic aorta.   Scattered calcifications involving the aortic arch.    Please see separately dictated report CT abdomen and pelvis performed   concurrently for intra-abdominal findings.    < end of copied text >    Cardiac cath: Coronary Dominance: Right dominate    LM: Mild Disease    LAD: Mild prox disease. 90% stenosis in mid LAD. 99% in stent stenosis distal LAD. D1 90% lesion, D2 99% lesion at the ostium. RCA supplied collateral to Apical LAD and diag    CX: Prox Moderate in-stent diffuse disease, 90% calcified lesion in distal Lcx. Moderate disease OM1    RCA: Mild diffuse disease       LVEDP: 9 mmHg       Allergies    No Known Allergies    Intolerances        MEDICATIONS  (STANDING):  aMIOdarone    Tablet 400 milliGRAM(s) Oral two times a day  amLODIPine   Tablet 5 milliGRAM(s) Oral daily  aspirin enteric coated 81 milliGRAM(s) Oral daily  atorvastatin 40 milliGRAM(s) Oral at bedtime  budesonide 160 MICROgram(s)/formoterol 4.5 MICROgram(s) Inhaler 2 Puff(s) Inhalation two times a day  chlorhexidine 4% Liquid 1 Application(s) Topical daily  enoxaparin Injectable 40 milliGRAM(s) SubCutaneous daily  furosemide    Tablet 40 milliGRAM(s) Oral daily  lisinopril 5 milliGRAM(s) Oral daily  metoprolol succinate ER 50 milliGRAM(s) Oral daily  pantoprazole    Tablet 40 milliGRAM(s) Oral before breakfast  sodium chloride 0.9%. 1000 milliLiter(s) (75 mL/Hr) IV Continuous <Continuous>    MEDICATIONS  (PRN):  albuterol/ipratropium for Nebulization 3 milliLiter(s) Nebulizer every 6 hours PRN Bronchospasm    Home Medications:  amLODIPine 5 mg oral tablet: 1 tab(s) orally once a day (10 Feb 2020 17:27)  aspirin 81 mg oral tablet, chewable: 1 tab(s) orally once a day (10 Feb 2020 17:27)  atorvastatin 40 mg oral tablet: 1 tab(s) orally once a day (10 Feb 2020 17:27)  benazepril 5 mg oral tablet: 1 tab(s) orally once a day (10 Feb 2020 17:27)  Metoprolol Succinate ER 50 mg oral tablet, extended release: 1 tab(s) orally once a day (10 Feb 2020 17:27)  Nitrostat 0.3 mg sublingual tablet: 1 tab(s) sublingual every 5 minutes, As Needed (10 Feb 2020 17:27)  Plavix 75 mg oral tablet: 1 tab(s) orally once a day (10 Feb 2020 17:27)  Ventolin HFA 90 mcg/inh inhalation aerosol: 2 puff(s) inhaled 4 times a day, As Needed (10 Feb 2020 17:27)      Assessment/Plan:  71y Male Pre-op for Possible CABG  - Case and plan discussed with CTU Intensivist and CT Surgeon - Dr. Case. STS risk score assessed and discussed risk with patient. Attending note to follow.  - Continue supportive care.    - Continue DVT/GI prophylaxis  - Incentive Spirometry 10 times an hour  - Continue to advance physical activity as tolerated and continue PT/OT as directed  1. CAD: Continue ASA, statin, BB. HOLD PLAVIX for PRE-OP CABG  2. EP: Possible Laser extraction and re-implant     CTS Ateending  case discussed wit Dr. Zavala today  CxR, CT Scans Cath dqata and Labs reviewed  Pt informed of the need for CABG  Pt also informed of the need for ICD lead replacement, since it is incapable of pacing due to fracture.  Pt is a good responder to Plavix and has a POC P2Y12 VerifyNow test of 85.  I anticipate pt will undergo CABG and ICD lead and generator replacement next Tuesday or Wednesday  depending of the level of P2Y12 activity.  I also informed the patient's referring cardiologist, Dr. Elizalde.    -FMR

## 2021-12-17 NOTE — PROGRESS NOTE ADULT - SUBJECTIVE AND OBJECTIVE BOX
----------Daily Progress Note----------    HISTORY OF PRESENT ILLNESS:  Patient is a 71y old Male who presents with a chief complaint of AICD firing (16 Dec 2021 19:26)    Currently admitted to medicine with the primary diagnosis of AICD discharge       Today is hospital day 2d.     INTERVAL HOSPITAL COURSE / OVERNIGHT EVENTS:    Patient was examined and seen at bedside. This morning he is resting comfortably in bed and reports no new issues or overnight events.     Review of Systems: Otherwise unremarkable     <<<<<PAST MEDICAL & SURGICAL HISTORY>>>>>  COPD (chronic obstructive pulmonary disease)    CAD (coronary artery disease)    Myocardial infarction  2006    CAD (coronary artery disease)  HAD CARDIAC CATH WITH STENTS 2006    Hypercholesteremia    HTN (hypertension)    CAD (coronary artery disease)  CARDIAC CATH 2006  AICD 2015    History of coronary artery stent placement      ALLERGIES  No Known Allergies      Home Medications:  amLODIPine 5 mg oral tablet: 1 tab(s) orally once a day (10 Feb 2020 17:27)  aspirin 81 mg oral tablet, chewable: 1 tab(s) orally once a day (10 Feb 2020 17:27)  atorvastatin 40 mg oral tablet: 1 tab(s) orally once a day (10 Feb 2020 17:27)  benazepril 5 mg oral tablet: 1 tab(s) orally once a day (10 Feb 2020 17:27)  Metoprolol Succinate ER 50 mg oral tablet, extended release: 1 tab(s) orally once a day (10 Feb 2020 17:27)  Nitrostat 0.3 mg sublingual tablet: 1 tab(s) sublingual every 5 minutes, As Needed (10 Feb 2020 17:27)  Plavix 75 mg oral tablet: 1 tab(s) orally once a day (10 Feb 2020 17:27)  Ventolin HFA 90 mcg/inh inhalation aerosol: 2 puff(s) inhaled 4 times a day, As Needed (10 Feb 2020 17:27)        MEDICATIONS  STANDING MEDICATIONS  aMIOdarone    Tablet 400 milliGRAM(s) Oral two times a day  amLODIPine   Tablet 5 milliGRAM(s) Oral daily  aspirin enteric coated 81 milliGRAM(s) Oral daily  atorvastatin 40 milliGRAM(s) Oral at bedtime  budesonide 160 MICROgram(s)/formoterol 4.5 MICROgram(s) Inhaler 2 Puff(s) Inhalation two times a day  chlorhexidine 4% Liquid 1 Application(s) Topical daily  clopidogrel Tablet 75 milliGRAM(s) Oral daily  enoxaparin Injectable 40 milliGRAM(s) SubCutaneous daily  furosemide    Tablet 40 milliGRAM(s) Oral daily  lisinopril 5 milliGRAM(s) Oral daily  metoprolol succinate ER 50 milliGRAM(s) Oral daily  pantoprazole    Tablet 40 milliGRAM(s) Oral before breakfast  sodium chloride 0.9%. 1000 milliLiter(s) IV Continuous <Continuous>    PRN MEDICATIONS  albuterol/ipratropium for Nebulization 3 milliLiter(s) Nebulizer every 6 hours PRN    VITALS:  T(F): 97.7  HR: 85  BP: 96/65  RR: 18  SpO2: 98%    <<<<<LABS>>>>>                        14.4   8.69  )-----------( 209      ( 17 Dec 2021 08:00 )             46.6     12-17    139  |  104  |  18  ----------------------------<  117<H>  4.7   |  15<L>  |  1.1    Ca    9.2      17 Dec 2021 08:00  Phos  3.3     12-16  Mg     2.0     12-17    TPro  7.1  /  Alb  4.3  /  TBili  0.4  /  DBili  x   /  AST  18  /  ALT  10  /  AlkPhos  68  12-17    PT/INR - ( 16 Dec 2021 04:30 )   PT: 12.70 sec;   INR: 1.11 ratio         PTT - ( 16 Dec 2021 04:30 )  PTT:33.3 sec        [id number]  CARDIAC MARKERS ( 15 Dec 2021 19:45 )  x     / <0.01 ng/mL / x     / x     / x            <<<<<RADIOLOGY>>>>>    < from: CT Abdomen and Pelvis No Cont (12.17.21 @ 09:24) >  PROCEDURE DATE:  12/17/2021          INTERPRETATION:  CLINICAL HISTORY: Preoperative.    TECHNIQUE: Contiguous axial CT images were obtained from the lower chest   to the pubic symphysis without intravenous contrast. Oral contrast was   not administered. Reformatted images in the coronal and sagittal planes   were acquired.    COMPARISON: None.      FINDINGS: Evaluation of intra-abdominal organs is limited due to lack of   intravenous contrast.    HEPATOBILIARY: Gallbladder sludge versus vicarious excretion of contrast.   Caudate hypodensity too small to characterize.    SPLEEN: Unremarkable.    PANCREAS: Unremarkable.    ADRENAL GLANDS: Unremarkable.    KIDNEYS: Indeterminate 2.0 cm right interpolar renal lesion measuring   higher than simple fluid attenuation. Bilateral renal cysts and   additional hypodensities too small to characterize. Subcentimeter right   lower pole renal angiomyolipoma versus scarring with invagination of   perirenal fat. No hydronephrosis.    ABDOMINOPELVIC NODES: Unremarkable    PELVIC ORGANS: Contrast in urinary bladder, which is contracted limiting   assessment. Enlarged prostate.    PERITONEUM/MESENTERY/BOWEL: No evidence of bowel obstruction ascites or   free air. Colonic diverticulosis.    BONES/SOFT TISSUES: Degenerative changes of the spine.    OTHER: Moderate degree of atherosclerotic calcifications of the abdominal   aorta and its branches.      IMPRESSION:    Moderate degree of atherosclerotic calcifications of the abdominal aorta   and its branches.    Indeterminate 2.0 cm right interpolar renal lesion measuring higher than   simple fluid attenuation. Cannot differentiate between proteinaceous cyst   and solid lesion by CT. Recommend evaluation with outpatient   contrast-enhanced MRI.    See separately dictated CT chest report for thoracic findings.    < end of copied text >      <<<<<PHYSICAL EXAM>>>>>  GENERAL: NAD, Resting comfortably in bed.  PULMONARY: Clear to auscultation bilaterally. No rales, rhonchi, or wheezing.  CARDIOVASCULAR: Regular rate and rhythm, S1-S2, no murmurs  GASTROINTESTINAL: Soft, non-tender, non-distended, no guarding.  SKIN/EXTREMITIES: No LE edema b/l  NEUROLOGIC: AAOX3      -----------------------------------------------------------------------------------------------------------------------------------------------------------------------------------------------

## 2021-12-17 NOTE — PROGRESS NOTE ADULT - ASSESSMENT
72 yo male, h/p CAD s.p 5 stents , MI 2006 s.p AICD, HTN, DL, COPD not on home oxygen or bipap presented for AICD firing    V tach with AICD firing    - EP consult--->may need lead replacement, echo shows EF 30-35%; f/u EP outpatient  - ICD interrogated by cardiac fellow--->5 episodes of V Tach 5 episodes of firing  - possible lead defect  - keep K > 4 and Mg > 2  -continue toprol, lisinopril      CAD s/p 5 stents/HTN/DLD  -continue home meds; home benazepril switched to lisinopril  -HDL 34 on lipid panel; TSH normal  -cath showed 2 vessel disease, pt scheduled for CABG with CT surgery next week      COPD not currently in exacerbation, current smoker  -continue duonebs, symbicort  - no wheezes on auscultation  - not on home oxygen or Bipap      DVT ppx: lovenox  GI ppx: protonix  Diet: DASH  Full code

## 2021-12-18 LAB
ALBUMIN SERPL ELPH-MCNC: 4.4 G/DL — SIGNIFICANT CHANGE UP (ref 3.5–5.2)
ALP SERPL-CCNC: 70 U/L — SIGNIFICANT CHANGE UP (ref 30–115)
ALT FLD-CCNC: 10 U/L — SIGNIFICANT CHANGE UP (ref 0–41)
ANION GAP SERPL CALC-SCNC: 15 MMOL/L — HIGH (ref 7–14)
APPEARANCE UR: ABNORMAL
AST SERPL-CCNC: 14 U/L — SIGNIFICANT CHANGE UP (ref 0–41)
BACTERIA # UR AUTO: NEGATIVE — SIGNIFICANT CHANGE UP
BILIRUB SERPL-MCNC: 0.4 MG/DL — SIGNIFICANT CHANGE UP (ref 0.2–1.2)
BILIRUB UR-MCNC: NEGATIVE — SIGNIFICANT CHANGE UP
BUN SERPL-MCNC: 25 MG/DL — HIGH (ref 10–20)
CALCIUM SERPL-MCNC: 9.9 MG/DL — SIGNIFICANT CHANGE UP (ref 8.5–10.1)
CHLORIDE SERPL-SCNC: 106 MMOL/L — SIGNIFICANT CHANGE UP (ref 98–110)
CO2 SERPL-SCNC: 23 MMOL/L — SIGNIFICANT CHANGE UP (ref 17–32)
COLOR SPEC: YELLOW — SIGNIFICANT CHANGE UP
CREAT SERPL-MCNC: 1.3 MG/DL — SIGNIFICANT CHANGE UP (ref 0.7–1.5)
DIFF PNL FLD: NEGATIVE — SIGNIFICANT CHANGE UP
EPI CELLS # UR: 4 /HPF — SIGNIFICANT CHANGE UP (ref 0–5)
GLUCOSE SERPL-MCNC: 99 MG/DL — SIGNIFICANT CHANGE UP (ref 70–99)
GLUCOSE UR QL: NEGATIVE — SIGNIFICANT CHANGE UP
HCT VFR BLD CALC: 45.8 % — SIGNIFICANT CHANGE UP (ref 42–52)
HGB BLD-MCNC: 14.3 G/DL — SIGNIFICANT CHANGE UP (ref 14–18)
HYALINE CASTS # UR AUTO: 18 /LPF — HIGH (ref 0–7)
KETONES UR-MCNC: SIGNIFICANT CHANGE UP
LEUKOCYTE ESTERASE UR-ACNC: NEGATIVE — SIGNIFICANT CHANGE UP
MAGNESIUM SERPL-MCNC: 2 MG/DL — SIGNIFICANT CHANGE UP (ref 1.8–2.4)
MCHC RBC-ENTMCNC: 26.7 PG — LOW (ref 27–31)
MCHC RBC-ENTMCNC: 31.2 G/DL — LOW (ref 32–37)
MCV RBC AUTO: 85.6 FL — SIGNIFICANT CHANGE UP (ref 80–94)
MRSA PCR RESULT.: NEGATIVE — SIGNIFICANT CHANGE UP
NITRITE UR-MCNC: NEGATIVE — SIGNIFICANT CHANGE UP
NRBC # BLD: 0 /100 WBCS — SIGNIFICANT CHANGE UP (ref 0–0)
PH UR: 5.5 — SIGNIFICANT CHANGE UP (ref 5–8)
PLATELET # BLD AUTO: 256 K/UL — SIGNIFICANT CHANGE UP (ref 130–400)
POTASSIUM SERPL-MCNC: 4.7 MMOL/L — SIGNIFICANT CHANGE UP (ref 3.5–5)
POTASSIUM SERPL-SCNC: 4.7 MMOL/L — SIGNIFICANT CHANGE UP (ref 3.5–5)
PROT SERPL-MCNC: 7.2 G/DL — SIGNIFICANT CHANGE UP (ref 6–8)
PROT UR-MCNC: ABNORMAL
RBC # BLD: 5.35 M/UL — SIGNIFICANT CHANGE UP (ref 4.7–6.1)
RBC # FLD: 16.5 % — HIGH (ref 11.5–14.5)
RBC CASTS # UR COMP ASSIST: 1 /HPF — SIGNIFICANT CHANGE UP (ref 0–4)
SODIUM SERPL-SCNC: 144 MMOL/L — SIGNIFICANT CHANGE UP (ref 135–146)
SP GR SPEC: 1.02 — SIGNIFICANT CHANGE UP (ref 1.01–1.03)
UROBILINOGEN FLD QL: SIGNIFICANT CHANGE UP
WBC # BLD: 11.85 K/UL — HIGH (ref 4.8–10.8)
WBC # FLD AUTO: 11.85 K/UL — HIGH (ref 4.8–10.8)
WBC UR QL: 3 /HPF — SIGNIFICANT CHANGE UP (ref 0–5)

## 2021-12-18 PROCEDURE — 99233 SBSQ HOSP IP/OBS HIGH 50: CPT

## 2021-12-18 RX ADMIN — Medication 81 MILLIGRAM(S): at 11:07

## 2021-12-18 RX ADMIN — ATORVASTATIN CALCIUM 40 MILLIGRAM(S): 80 TABLET, FILM COATED ORAL at 22:05

## 2021-12-18 RX ADMIN — AMIODARONE HYDROCHLORIDE 400 MILLIGRAM(S): 400 TABLET ORAL at 06:03

## 2021-12-18 RX ADMIN — AMLODIPINE BESYLATE 5 MILLIGRAM(S): 2.5 TABLET ORAL at 06:02

## 2021-12-18 RX ADMIN — AMIODARONE HYDROCHLORIDE 400 MILLIGRAM(S): 400 TABLET ORAL at 17:36

## 2021-12-18 RX ADMIN — LISINOPRIL 5 MILLIGRAM(S): 2.5 TABLET ORAL at 06:02

## 2021-12-18 RX ADMIN — ENOXAPARIN SODIUM 40 MILLIGRAM(S): 100 INJECTION SUBCUTANEOUS at 11:08

## 2021-12-18 RX ADMIN — PANTOPRAZOLE SODIUM 40 MILLIGRAM(S): 20 TABLET, DELAYED RELEASE ORAL at 06:02

## 2021-12-18 RX ADMIN — Medication 40 MILLIGRAM(S): at 06:02

## 2021-12-18 RX ADMIN — Medication 50 MILLIGRAM(S): at 06:02

## 2021-12-18 NOTE — PROGRESS NOTE ADULT - SUBJECTIVE AND OBJECTIVE BOX
pt seen and examined.     ROS: no cp, no sob, no n/v, no fever    PAST MEDICAL & SURGICAL HISTORY:  COPD (chronic obstructive pulmonary disease)  CAD (coronary artery disease) MI and sp stents   Hypercholesteremia  HTN (hypertension)  AICD 2015    Vital Signs Last 24 Hrs  T(C): 36.4 (18 Dec 2021 13:29), Max: 36.6 (18 Dec 2021 11:01)  T(F): 97.6 (18 Dec 2021 13:29), Max: 97.9 (18 Dec 2021 11:01)  HR: 69 (18 Dec 2021 13:29) (63 - 72)  BP: 110/70 (18 Dec 2021 13:29) (108/61 - 124/74)  BP(mean): 92 (18 Dec 2021 08:00) (78 - 92)  RR: 18 (18 Dec 2021 13:29) (16 - 18)  SpO2: 98% (18 Dec 2021 11:01) (98% - 98%)    physical exam  General NAD, AAOx3 walking in the unit  Head and neck wnl, no mass,   Lungs bilat AE, no wheezing   Cardiovascular RRR  abdomen soft ND, NT, BS+   ext NE, NT , No joint problems   skin intact  Neuro wnl, non focal, no focal deficit,       MEDICATIONS  (STANDING):  aMIOdarone    Tablet 400 milliGRAM(s) Oral two times a day  amLODIPine   Tablet 5 milliGRAM(s) Oral daily  aspirin enteric coated 81 milliGRAM(s) Oral daily  atorvastatin 40 milliGRAM(s) Oral at bedtime  budesonide 160 MICROgram(s)/formoterol 4.5 MICROgram(s) Inhaler 2 Puff(s) Inhalation two times a day  chlorhexidine 4% Liquid 1 Application(s) Topical daily  enoxaparin Injectable 40 milliGRAM(s) SubCutaneous daily  furosemide    Tablet 40 milliGRAM(s) Oral daily  lisinopril 5 milliGRAM(s) Oral daily  metoprolol succinate ER 50 milliGRAM(s) Oral daily  pantoprazole    Tablet 40 milliGRAM(s) Oral before breakfast  sodium chloride 0.9%. 1000 milliLiter(s) (75 mL/Hr) IV Continuous <Continuous>    MEDICATIONS  (PRN):  albuterol/ipratropium for Nebulization 3 milliLiter(s) Nebulizer every 6 hours PRN Bronchospasm                        14.3   11.85 )-----------( 256      ( 18 Dec 2021 04:30 )             45.8     12-18    144  |  106  |  25<H>  ----------------------------<  99  4.7   |  23  |  1.3    Ca    9.9      18 Dec 2021 04:30  Mg     2.0     12-18    TPro  7.2  /  Alb  4.4  /  TBili  0.4  /  DBili  x   /  AST  14  /  ALT  10  /  AlkPhos  70  12-18    a/p     72 yo male, h/p CAD s.p 5 stents , MI 2006 s.p AICD, HTN, DL, COPD not on home oxygen or bipap presented for AICD firing.  Patient felt 3 episodes of AICD firing causing chest discomfort today staring 6 PM; No similar episode in the past.  Last time he followed with EP, 8 months ago, he has been told that one of the leads might not be working.  Upon ICD interrogation by cardiac fellow V tach 5x s.p shock was detected.    Subsequent cardiac catheterization on 12/16/2021 revealed severe 2vCAD.CABG and ICD lead and generator replacement next Tuesday or Wednesday    #Progress Note Handoff  Pending (specify):  CABG and aicd lead replacement tuesday or wednesday   Family discussion: westley pt   Disposition: Home_

## 2021-12-18 NOTE — PROGRESS NOTE ADULT - SUBJECTIVE AND OBJECTIVE BOX
OPERATIVE PROCEDURE(s):                POD #                       71yMale  SURGEON(s): DERIK Herr  SUBJECTIVE ASSESSMENT:   Vital Signs Last 24 Hrs  T(F): 97.6 (18 Dec 2021 04:00), Max: 97.7 (17 Dec 2021 08:01)  HR: 63 (18 Dec 2021 04:00) (63 - 85)  BP: 108/61 (18 Dec 2021 04:00) (96/65 - 115/72)  BP(mean): 78 (18 Dec 2021 04:00) (76 - 92)  RR: 16 (18 Dec 2021 04:00) (16 - 18)  SpO2: 98% (18 Dec 2021 04:00) (98% - 98%)    I&O's Detail    Net:   I&O's Detail    16 Dec 2021 07:01  -  17 Dec 2021 07:00  --------------------------------------------------------  Total NET: -1760 mL        CAPILLARY BLOOD GLUCOSE        Physical Exam:  General: NAD; A&Ox3/Patient is intubated and sedated  Cardiac: S1/S2, RRR, no murmur, no rubs  Lungs: unlabored respirations, CTA b/l, no wheeze, no rales, no crackles  Abdomen: Soft/NT/ND; positive bowel sounds x 4  Sternum: Intact, no click, incision healing well with no drainage  Incisions: Incisions clean/dry/intact  Extremities: No edema b/l lower extremities; good capillary refill; no cyanosis; palpable 1+ pedal pulses b/l    Central Venous Catheter: Yes[]  No[] , If Yes indication:           Day #  [place] Catheter: Yes  [] , No  [] , If yes indication:                      Day #  NGT: Yes [] No [] ,    If Yes Placement:                                     Day #  EPICARDIAL WIRES:  [] YES [] NO                                              Day #  BOWEL MOVEMENT:  [] YES [] NO, If No, Timing since last BM:      Day #  CHEST TUBE(Left/Right):  [] YES [] NO, If yes -  AIR LEAKS:  [] YES [] NO        LABS:                        14.4   8.69  )-----------( 209      ( 17 Dec 2021 08:00 )             46.6                         14.7   8.00  )-----------( 198      ( 16 Dec 2021 04:30 )             46.9     12-17    139  |  104  |  18  ----------------------------<  117<H>  4.7   |  15<L>  |  1.1  12-16    139  |  105  |  15  ----------------------------<  100<H>  4.4   |  20  |  0.8    Ca    9.2      17 Dec 2021 08:00  Phos  3.3     12-16  Mg     2.0     12-17    TPro  7.1 [6.0 - 8.0]  /  Alb  4.3 [3.5 - 5.2]  /  TBili  0.4 [0.2 - 1.2]  /  DBili  x   /  AST  18 [0 - 41]  /  ALT  10 [0 - 41]  /  AlkPhos  68 [30 - 115]  12-17          RADIOLOGY & ADDITIONAL TESTS:  CXR:  EKG:  MEDICATIONS  (STANDING):  aMIOdarone    Tablet 400 milliGRAM(s) Oral two times a day  amLODIPine   Tablet 5 milliGRAM(s) Oral daily  aspirin enteric coated 81 milliGRAM(s) Oral daily  atorvastatin 40 milliGRAM(s) Oral at bedtime  budesonide 160 MICROgram(s)/formoterol 4.5 MICROgram(s) Inhaler 2 Puff(s) Inhalation two times a day  chlorhexidine 4% Liquid 1 Application(s) Topical daily  enoxaparin Injectable 40 milliGRAM(s) SubCutaneous daily  furosemide    Tablet 40 milliGRAM(s) Oral daily  lisinopril 5 milliGRAM(s) Oral daily  metoprolol succinate ER 50 milliGRAM(s) Oral daily  pantoprazole    Tablet 40 milliGRAM(s) Oral before breakfast  sodium chloride 0.9%. 1000 milliLiter(s) (75 mL/Hr) IV Continuous <Continuous>    MEDICATIONS  (PRN):  albuterol/ipratropium for Nebulization 3 milliLiter(s) Nebulizer every 6 hours PRN Bronchospasm    HEPARIN:  [] YES [] NO  Dose: XX UNITS/HR UNITS Q8H  LOVENOX:[] YES [] NO  Dose: XX mg Q24H  COUMADIN: []  YES [] NO  Dose: XX mg  Q24H  SCD's: YES b/l  GI Prophylaxis: Protonix [], Pepcid [], None [], (Contra-indication:.....)    Post-Op Beta-Blockers: Yes [], No[], If No, then contraindication:  Post-Op Aspirin: Yes [],  No [], If No, then contraindication:  Post-Op Statin: Yes [], No[], If No, then contraindication:  Allergies    No Known Allergies    Intolerances      Ambulation/Activity Status:    Assessment/Plan:  71y Male status-post .....  - Case and plan discussed with CTU Intensivist and CT Surgeon - Dr. Villegas/Manpreet/[place]  - Continue CTU supportive care    - Continue DVT/GI prophylaxis  - Incentive Spirometry 10 times an hour  - Continue to advance physical activity as tolerated and continue PT/OT as directed  1. CAD: Continue ASA, statin, BB  2. HTN:   3. A. Fib:   4. COPD/Hypoxia:   5. DM/Glucose Control:     Social Service Disposition:

## 2021-12-19 PROCEDURE — 99232 SBSQ HOSP IP/OBS MODERATE 35: CPT

## 2021-12-19 PROCEDURE — 99233 SBSQ HOSP IP/OBS HIGH 50: CPT

## 2021-12-19 PROCEDURE — 93010 ELECTROCARDIOGRAM REPORT: CPT

## 2021-12-19 RX ADMIN — ATORVASTATIN CALCIUM 40 MILLIGRAM(S): 80 TABLET, FILM COATED ORAL at 22:13

## 2021-12-19 RX ADMIN — Medication 50 MILLIGRAM(S): at 05:26

## 2021-12-19 RX ADMIN — AMIODARONE HYDROCHLORIDE 400 MILLIGRAM(S): 400 TABLET ORAL at 05:25

## 2021-12-19 RX ADMIN — ENOXAPARIN SODIUM 40 MILLIGRAM(S): 100 INJECTION SUBCUTANEOUS at 12:01

## 2021-12-19 RX ADMIN — AMLODIPINE BESYLATE 5 MILLIGRAM(S): 2.5 TABLET ORAL at 05:25

## 2021-12-19 RX ADMIN — PANTOPRAZOLE SODIUM 40 MILLIGRAM(S): 20 TABLET, DELAYED RELEASE ORAL at 05:26

## 2021-12-19 RX ADMIN — LISINOPRIL 5 MILLIGRAM(S): 2.5 TABLET ORAL at 05:26

## 2021-12-19 RX ADMIN — Medication 81 MILLIGRAM(S): at 12:02

## 2021-12-19 RX ADMIN — Medication 40 MILLIGRAM(S): at 05:26

## 2021-12-19 NOTE — PROGRESS NOTE ADULT - ASSESSMENT
72 yo male, h/p CAD s.p 5 stents , MI 2006 s.p AICD, HTN, DL, COPD not on home oxygen or bipap presented for AICD firing    # V tach with AICD firing  - EP consult--->may need lead replacement, echo shows EF 30-35%; f/u EP outpatient  - ICD interrogated by cardiac fellow: 5 episodes of V Tach 5 episodes of firing  - possible lead defect  - cardiac catheterization on 12/16/2021 revealed severe 2vCAD. CABG and ICD lead and generator replacement next Tuesday or Wednesday  - keep K > 4 and Mg > 2  -continue toprol, lisinopril    # CAD s/p 5 stents/HTN/DLD  -continue home meds; home benazepril switched to lisinopril  -HDL 34 on lipid panel; TSH normal  -cath showed 2 vessel disease, pt scheduled for CABG with CT surgery next week    # COPD not currently in exacerbation, current smoker  -continue duonebs, symbicort  - no wheezes on auscultation  - not on home oxygen or Bipap    Misc:  # DVT ppx: lovenox  # GI ppx: protonix  # Diet: DASH  # Activity: AAT  # Cide: Full code  #Dispo: acute

## 2021-12-19 NOTE — PROGRESS NOTE ADULT - SUBJECTIVE AND OBJECTIVE BOX
ZUNILDA DENNIS 71y Male  MRN#: [id number]     SUBJECTIVE  Patient is a 71y old Male who presents with a chief complaint of AICD firing (19 Dec 2021 07:50)    Interval/Overnight Events:    Today is hospital day 4d, and this morning he is lying in bed without distress.   No acute overnight events.     OBJECTIVE  PAST MEDICAL & SURGICAL HISTORY  COPD (chronic obstructive pulmonary disease)    CAD (coronary artery disease)    Myocardial infarction      CAD (coronary artery disease)  HAD CARDIAC CATH WITH STENTS     Hypercholesteremia    HTN (hypertension)    CAD (coronary artery disease)  CARDIAC CATH   AICD 2015    History of coronary artery stent placement      ALLERGIES:  No Known Allergies    MEDICATIONS:  STANDING MEDICATIONS  aMIOdarone    Tablet 400 milliGRAM(s) Oral two times a day  amLODIPine   Tablet 5 milliGRAM(s) Oral daily  aspirin enteric coated 81 milliGRAM(s) Oral daily  atorvastatin 40 milliGRAM(s) Oral at bedtime  budesonide 160 MICROgram(s)/formoterol 4.5 MICROgram(s) Inhaler 2 Puff(s) Inhalation two times a day  chlorhexidine 4% Liquid 1 Application(s) Topical daily  enoxaparin Injectable 40 milliGRAM(s) SubCutaneous daily  furosemide    Tablet 40 milliGRAM(s) Oral daily  lisinopril 5 milliGRAM(s) Oral daily  metoprolol succinate ER 50 milliGRAM(s) Oral daily  pantoprazole    Tablet 40 milliGRAM(s) Oral before breakfast  sodium chloride 0.9%. 1000 milliLiter(s) IV Continuous <Continuous>    PRN MEDICATIONS  albuterol/ipratropium for Nebulization 3 milliLiter(s) Nebulizer every 6 hours PRN    HOME MEDICATIONS  Home Medications:  amLODIPine 5 mg oral tablet: 1 tab(s) orally once a day (10 Feb 2020 17:27)  aspirin 81 mg oral tablet, chewable: 1 tab(s) orally once a day (10 Feb 2020 17:27)  atorvastatin 40 mg oral tablet: 1 tab(s) orally once a day (10 Feb 2020 17:27)  benazepril 5 mg oral tablet: 1 tab(s) orally once a day (10 Feb 2020 17:27)  Metoprolol Succinate ER 50 mg oral tablet, extended release: 1 tab(s) orally once a day (10 Feb 2020 17:27)  Nitrostat 0.3 mg sublingual tablet: 1 tab(s) sublingual every 5 minutes, As Needed (10 Feb 2020 17:27)  Plavix 75 mg oral tablet: 1 tab(s) orally once a day (10 Feb 2020 17:27)  Ventolin HFA 90 mcg/inh inhalation aerosol: 2 puff(s) inhaled 4 times a day, As Needed (10 Feb 2020 17:27)      LABS:                        14.3   11.85 )-----------( 256      ( 18 Dec 2021 04:30 )             45.8         144  |  106  |  25<H>  ----------------------------<  99  4.7   |  23  |  1.3    Ca    9.9      18 Dec 2021 04:30  Mg     2.0         TPro  7.2  /  Alb  4.4  /  TBili  0.4  /  DBili  x   /  AST  14  /  ALT  10  /  AlkPhos  70      LIVER FUNCTIONS - ( 18 Dec 2021 04:30 )  Alb: 4.4 g/dL / Pro: 7.2 g/dL / ALK PHOS: 70 U/L / ALT: 10 U/L / AST: 14 U/L / GGT: x             Urinalysis Basic - ( 18 Dec 2021 16:25 )    Color: Yellow / Appearance: Slightly Turbid / S.021 / pH: x  Gluc: x / Ketone: Trace  / Bili: Negative / Urobili: <2 mg/dL   Blood: x / Protein: 30 mg/dL / Nitrite: Negative   Leuk Esterase: Negative / RBC: 1 /HPF / WBC 3 /HPF   Sq Epi: x / Non Sq Epi: 4 /HPF / Bacteria: Negative                CAPILLARY BLOOD GLUCOSE          PHYSICAL EXAM:  T(C): 36.4 (21 @ 05:22), Max: 36.6 (21 @ 11:01)  HR: 65 (21 @ 05:22) (65 - 72)  BP: 119/62 (21 @ 05:22) (109/59 - 119/62)  RR: 18 (21 @ 05:22) (18 - 18)  SpO2: 100% (21 @ 05:22) (98% - 100%)    GENERAL: NAD, well-developed, 71y  EENT: EOMI, conjunctiva and sclera clear, No nasal obstruction or discharge  RESPIRATORY: Clear to auscultation bilaterally; No wheeze or crackles  CARDIOVASCULAR: Regular rate and rhythm; No murmurs, rubs, or gallops, no pitting edema  GASTROINTESTINAL: Abdomen Soft, Nontender, Nondistended  MUSCULOSKELETAL:  No cyanosis, extremities grossly symmetrical  PSYCH: AAOx3, affect appropriate  NEUROLOGY: non-focal, cognition grossly intact, MAEE    ADMISSION SUMMARY  Patient is a 71y old Male who presents with a chief complaint of AICD firing (19 Dec 2021 07:50)

## 2021-12-19 NOTE — PROGRESS NOTE ADULT - ATTENDING COMMENTS
Patient seen and examined independently. Agree with resident note/ history / physical exam and plan of care with following exceptions/additions/updates. Case discussed with patient/pt decision maker, house-staff and nursing. My notes supersedes the resident's notes in case of discrepancies.    a/p  # aicd firing, with vtach and lead malfunction, needs lead replacement  # cad, needs cabg,     plan is surgery for both ( lead replacement and cabg ) on tuesday     #Progress Note Handoff  Pending (specify):  cabg and aicd lead replacement on Tuesday   Family discussion: westley pt   Disposition: Home

## 2021-12-19 NOTE — PROGRESS NOTE ADULT - SUBJECTIVE AND OBJECTIVE BOX
OPERATIVE PROCEDURE(s):  pre-op CABg and lead extraction               SURGEON(s): / Manpreet/ Valencia      SUBJECTIVE ASSESSMENT: No acute complains    Vital Signs Last 24 Hrs  T(C): 36.4 (19 Dec 2021 05:22), Max: 36.6 (18 Dec 2021 11:01)  T(F): 97.6 (19 Dec 2021 05:22), Max: 97.9 (18 Dec 2021 11:01)  HR: 65 (19 Dec 2021 05:22) (65 - 72)  BP: 119/62 (19 Dec 2021 05:22) (109/59 - 124/74)  BP(mean): 92 (18 Dec 2021 08:00) (92 - 92)  RR: 18 (19 Dec 2021 05:22) (18 - 18)  SpO2: 100% (19 Dec 2021 05:22) (98% - 100%)  21 @ 07:01  -  21 @ 07:00  --------------------------------------------------------  IN: 300 mL / OUT: 700 mL / NET: -400 mL        Physical Exam  General: NAD  Chest: CTA b/l  CVS: s1, s2  Abd: + bs soft, NT/ND  Ext: NO edema      Post-Op Beta-Blockers: Yes [ ], No[ ], If No, then contraindication:    CHEST TUBE:  [ ] YES [ ] NO  OUTPUT:     per 24 hours    AIR LEAKS:  [ ] YES [ ] NO      EPICARDIAL WIRES:  [ ] YES [ ] NO      BOWEL MOVEMENT:  [ ] YES [ ] NO          LABS:                        14.3   11.85 )-----------( 256      ( 18 Dec 2021 04:30 )             45.8     COUMADIN:   [ ] YES [ ] NO          144  |  106  |  25<H>  ----------------------------<  99  4.7   |  23  |  1.3    Ca    9.9      18 Dec 2021 04:30  Mg     2.0         TPro  7.2  /  Alb  4.4  /  TBili  0.4  /  DBili  x   /  AST  14  /  ALT  10  /  AlkPhos  70  12-18    Urinalysis Basic - ( 18 Dec 2021 16:25 )    Color: Yellow / Appearance: Slightly Turbid / S.021 / pH: x  Gluc: x / Ketone: Trace  / Bili: Negative / Urobili: <2 mg/dL   Blood: x / Protein: 30 mg/dL / Nitrite: Negative   Leuk Esterase: Negative / RBC: 1 /HPF / WBC 3 /HPF   Sq Epi: x / Non Sq Epi: 4 /HPF / Bacteria: Negative        MEDICATIONS  (STANDING):  aMIOdarone    Tablet 400 milliGRAM(s) Oral two times a day  amLODIPine   Tablet 5 milliGRAM(s) Oral daily  aspirin enteric coated 81 milliGRAM(s) Oral daily  atorvastatin 40 milliGRAM(s) Oral at bedtime  budesonide 160 MICROgram(s)/formoterol 4.5 MICROgram(s) Inhaler 2 Puff(s) Inhalation two times a day  chlorhexidine 4% Liquid 1 Application(s) Topical daily  enoxaparin Injectable 40 milliGRAM(s) SubCutaneous daily  furosemide    Tablet 40 milliGRAM(s) Oral daily  lisinopril 5 milliGRAM(s) Oral daily  metoprolol succinate ER 50 milliGRAM(s) Oral daily  pantoprazole    Tablet 40 milliGRAM(s) Oral before breakfast  sodium chloride 0.9%. 1000 milliLiter(s) (75 mL/Hr) IV Continuous <Continuous>    MEDICATIONS  (PRN):  albuterol/ipratropium for Nebulization 3 milliLiter(s) Nebulizer every 6 hours PRN Bronchospasm      Allergies    No Known Allergies    Intolerances        Ambulation/Activity Status:        RADIOLOGY & ADDITIONAL TESTS:        Assessment/Plan:    Social Service Disposition:     OPERATIVE PROCEDURE(s):  pre-op CABg and lead extraction               SURGEON(s): / Manpreet/ Valencia      SUBJECTIVE ASSESSMENT: No acute complains    Vital Signs Last 24 Hrs  T(C): 36.4 (19 Dec 2021 05:22), Max: 36.6 (18 Dec 2021 11:01)  T(F): 97.6 (19 Dec 2021 05:22), Max: 97.9 (18 Dec 2021 11:01)  HR: 65 (19 Dec 2021 05:22) (65 - 72)  BP: 119/62 (19 Dec 2021 05:22) (109/59 - 124/74)  BP(mean): 92 (18 Dec 2021 08:00) (92 - 92)  RR: 18 (19 Dec 2021 05:22) (18 - 18)  SpO2: 100% (19 Dec 2021 05:22) (98% - 100%)  21 @ 07:01  -  21 @ 07:00  --------------------------------------------------------  IN: 300 mL / OUT: 700 mL / NET: -400 mL        Physical Exam  General: NAD  Chest: CTA b/l  CVS: s1, s2  Abd: + bs soft, NT/ND  Ext: NO edema      Post-Op Beta-Blockers: Yes [ ], No[ ], If No, then contraindication:    LABS:                        14.3   11.85 )-----------( 256      ( 18 Dec 2021 04:30 )             45.8     COUMADIN:   [ ] YES [ ] NO          144  |  106  |  25<H>  ----------------------------<  99  4.7   |  23  |  1.3    Ca    9.9      18 Dec 2021 04:30  Mg     2.0         TPro  7.2  /  Alb  4.4  /  TBili  0.4  /  DBili  x   /  AST  14  /  ALT  10  /  AlkPhos  70      Urinalysis Basic - ( 18 Dec 2021 16:25 )    Color: Yellow / Appearance: Slightly Turbid / S.021 / pH: x  Gluc: x / Ketone: Trace  / Bili: Negative / Urobili: <2 mg/dL   Blood: x / Protein: 30 mg/dL / Nitrite: Negative   Leuk Esterase: Negative / RBC: 1 /HPF / WBC 3 /HPF   Sq Epi: x / Non Sq Epi: 4 /HPF / Bacteria: Negative        MEDICATIONS  (STANDING):  aMIOdarone    Tablet 400 milliGRAM(s) Oral two times a day  amLODIPine   Tablet 5 milliGRAM(s) Oral daily  aspirin enteric coated 81 milliGRAM(s) Oral daily  atorvastatin 40 milliGRAM(s) Oral at bedtime  budesonide 160 MICROgram(s)/formoterol 4.5 MICROgram(s) Inhaler 2 Puff(s) Inhalation two times a day  chlorhexidine 4% Liquid 1 Application(s) Topical daily  enoxaparin Injectable 40 milliGRAM(s) SubCutaneous daily  furosemide    Tablet 40 milliGRAM(s) Oral daily  lisinopril 5 milliGRAM(s) Oral daily  metoprolol succinate ER 50 milliGRAM(s) Oral daily  pantoprazole    Tablet 40 milliGRAM(s) Oral before breakfast  sodium chloride 0.9%. 1000 milliLiter(s) (75 mL/Hr) IV Continuous <Continuous>    MEDICATIONS  (PRN):  albuterol/ipratropium for Nebulization 3 milliLiter(s) Nebulizer every 6 hours PRN Bronchospasm      Allergies    No Known Allergies    Intolerances        Ambulation/Activity Status: oob        RADIOLOGY & ADDITIONAL TESTS:     EXAM:  ECHO TTE WO CON COMP W DOPP        PROCEDURE DATE:  2021      INTERPRETATION:   [place] [address]                Phone: [phone].   TRANSTHORACIC ECHOCARDIOGRAM REPORT        Patient Name:   ZUNILDA DENNIS Accession #: [id number]  Medical Rec #:  [id number]      Height:      71.0 in 180.3 cm  YOB: 1950     Weight:      172.0 lb 78.02 kg  Patient Age:    71 yearsBSA:         1.98 m²  Patient Gender: M             BP:          129/63 mmHg      Date of Exam:        2021 12:04:10 PM  Referring Physician: Eugenio Woody  Sonographer:         Henry Carrasco  Reading Physician:   Mainor Galloway MD, FACC.    Procedure:   2D Echo/Doppler/Color Doppler Complete.  Indications: I47.2 - Ventricular Tachycardia  Diagnosis:   Ventricular tachycardia - I47.2        Summary:   1. Left ventricular ejection fraction, by visual estimation, is 30 to   35%.   2. Multiple left ventricular regional wall motion abnormalities exist.   See wall motion findings.   3. Spectral Doppler shows impaired relaxation pattern of left   ventricular myocardial filling (Grade I diastolic dysfunction).   4. Mildly enlarged left atrium.   5. Normal right atrial size.   6. Mild mitral valve regurgitation.   7. Mild thickening of the anterior and posterior mitral valve leaflets.   8. Moderate mitral annular calcification.   9. Mild tricuspid regurgitation.  10. Mild aortic regurgitation.  11. Sclerotic aortic valve with normal opening.    PHYSICIAN INTERPRETATION:  Left Ventricle: The left ventricular internal cavity size is normal. Left   ventricular wall thickness is normal. Left ventricular ejection fraction,   by visual estimation, is 30 to 35%. Spectral Doppler shows impaired   relaxation pattern of left ventricular myocardial filling (Grade I   diastolic dysfunction).      LV Wall Scoring:  The apical septal segment, apical anterior segment, and apex are   akinetic. The  mid anteroseptal segment, apical lateral segment, mid anterior segment,   and  apical inferior segment are hypokinetic. All remaining scored segments are  normal.    Right Ventricle: Normal right ventricular size and function.  Left Atrium: Mildly enlarged left atrium.  Right Atrium: Normal right atrial size.  Pericardium: There is no evidence of pericardial effusion.  Mitral Valve: Mild thickening of the anterior and posterior mitral valve   leaflets. There is moderate mitral annular calcification. Mild mitral   valve regurgitation is seen.  Tricuspid Valve: Structurally normal tricuspid valve, with normal leaflet   excursion. Mild tricuspid regurgitation is visualized.  Aortic Valve: The aortic valve was not well visualized. The aortic valve   is trileaflet. No evidence of aortic stenosis. Sclerotic aortic valve   with normal opening. Mild aortic valve regurgitation is seen.  Pulmonic Valve: Structurally normal pulmonic valve, with normal leaflet   excursion. Trace pulmonic valve regurgitation.  Aorta: The aortic root is normal in size and structure.  Venous: The inferior vena cava was normal sized, with respiratory size   variation greater than 50%.  Additional Comments: A pacer wire is visualized in the right ventricle.      2D AND M-MODE MEASUREMENTS (normal ranges within parentheses):  Left Ventricle:                  Normal   Aorta/Left Atrium:               Normal  IVSd (2D):              1.02 cm (0.7-1.1) AoV Cusp Separation: 1.71 cm   (1.5-2.6)  LVPWd (2D):             0.99 cm (0.7-1.1) Left Atrium (Mmode): 2.60 cm   (1.9-4.0)  LVIDd (2D):             4.30 cm (3.4-5.7) Right Ventricle:  LVIDs (2D):             3.57 cm           RVd (2D):        2.95 cm  LV FS (2D):             16.9 %   (>25%)  Relative Wall Thickness  0.46    (<0.42)    SPECTRAL DOPPLER ANALYSIS:  LV DIASTOLIC FUNCTION:  MV Peak E: 0.30 m/s Decel Time: 155 msec  MV Peak A: 0.85 m/s  E/A Ratio: 0.35    Aortic Valve:  AoV VMax:    1.60 m/s  AoV Area, Vmax:    2.77 cm² Vmax Indx:    1.40   cm²/m²  AoV VTI:     0.28 m    AoV Area, VTI:     2.85 cm² VTI Indx:     1.44   cm²/m²  AoV Pk Grad: 10.2 mmHg AoV Area, Mn Grad: 3.18 cm² Mn Grad Indx: 1.61   cm²/m²  AoV Mn Grad: 4.6 mmHg    LVOT Vmax: 1.13 m/s  LVOT VTI:  0.20 m  LVOT Diam: 2.23 cm    Mitral Valve:  MV P1/2 Time: 44.97 msec  MV Area, PHT: 4.89 cm²    Tricuspid Valve and PA/RV Systolic Pressure: TR Max Velocity: 1.89 m/s RA   Pressure: 3 mmHg RVSP/PASP: 17.3 mmHg      [id number] Mainor Galloway MD, [facility], Electronically signed on 2021 at   5:53:00 PM            *** Final ***                MAINOR GALLOWAY MD; Attending Cardiologist  This document has been electronically signed. Dec 16 2021 12:04PM       ACC: [id number] EXAM:  CT CHEST                          PROCEDURE DATE:  2021          INTERPRETATION:  Reason for Exam:  Preoperative evaluation. Coronary   artery disease.    CT of the chest was performed from the thoracic inlet to the levelof the   adrenal glands without contrast injection. Coronal and sagittal images   have been submitted.    Comparison: None available.    Findings:    Tubes/Lines: Left chest wall pacemaker    Lungs, Pleura, and Airways:Trachea and central airways arepatent. No   consolidation, pleural effusion or pneumothorax. No architectural   distortion, honeycombing or bronchiectasis. Left lower lobe 0.3 cm solid   nodules (series 5 image 154, 171). Left lower lobe 0.2 cm solid nodules   (series 5 image 187,214, 221). Left upper lobe 0.2 cm solid nodule   (series 5 image 118, 137).    Mediastinum/Lymph Nodes:No enlarged mediastinal, hilar or axillary lymph   nodes.    Heart/Great Vessels: Coronary atherosclerotic disease. Left ventricular   system interval fat deposition consistent with chronic infarction. No   pericardial effusion. Ascending thoracic aorta and main pulmonary artery   are normal in caliber. Calcifications involving the aortic arch. No   significant atherosclerosis of the ascending thoracic aorta.    Bones and soft tissues: No acute osseous abnormality. Degenerative   changes of the spine.    IMPRESSION:    No acute intrathoracic pathology.    Scattered left sided pulmonary nodules measuring up to 0.3 cm, as above.    No significant calcifications involving the ascending thoracic aorta.   Scattered calcifications involving the aortic arch.    Please see separately dictated report CT abdomen and pelvis performed   concurrently for intra-abdominal findings.    --- End of Report---            ELLIOT LANDAU MD; Attending Radiologist  This document has been electronically signed. Dec 17 2021  9:45AM        ******PRELIMINARY REPORT******      ******PRELIMINARY REPORT******       ACC: [id number] EXAM:  CAROTID DUPLEX COMPLETE BILAT                          PROCEDURE DATE:  2021    ******PRELIMINARY REPORT******      ******PRELIMINARY REPORT******           INTERPRETATION:  CLINICAL INFORMATION: 71-year-old gentleman presents for   preop procedure    COMPARISON: None available.    TECHNIQUE: Grayscale, color and spectral Doppler examination of both   carotid arteries was performed.    FINDINGS:    No elevated velocities or abnormal waveforms are encountered.    Peak systolic velocities are as follows:    RIGHT:  PROX CCA = 51 cm/s  DIST CCA = 59 cm/s  PROX ICA = 38 cm/s  DIST ICA = 57 cm/s  ECA = 49 cm/s    LEFT:  PROX CCA = 55 cm/s  DIST CCA = 64 cm/s  PROX ICA = 48 cm/s  DIST ICA = 58 cm/s  ECA = 58 cm/s    Antegrade flow is noted within both vertebral arteries.    IMPRESSION: Mild 20-39% stenosis in bilateral internal carotid arteries    Measurement of carotid stenosis is based on velocity parameters that   correlate the residual internal carotid diameter with that of the more   distal vessel in accordance with a method such as the [place] [employer]   Symptomatic Carotid Endarterectomy Trial (NASCET).        ******PRELIMINARY REPORT******      ******PRELIMINARY REPORT******       FLORIAN LOPEZ MD; Resident Radiologist            Assessment/Plan: 70 y/o male with 3vd and lead malfunction    Verify now in am  Pre-op CABG/lead revision    Social Service Disposition:

## 2021-12-20 LAB
ALBUMIN SERPL ELPH-MCNC: 4.9 G/DL — SIGNIFICANT CHANGE UP (ref 3.5–5.2)
ALP SERPL-CCNC: 85 U/L — SIGNIFICANT CHANGE UP (ref 30–115)
ALT FLD-CCNC: 16 U/L — SIGNIFICANT CHANGE UP (ref 0–41)
ANION GAP SERPL CALC-SCNC: 19 MMOL/L — HIGH (ref 7–14)
APTT BLD: 34.3 SEC — SIGNIFICANT CHANGE UP (ref 27–39.2)
AST SERPL-CCNC: 21 U/L — SIGNIFICANT CHANGE UP (ref 0–41)
BILIRUB SERPL-MCNC: 0.5 MG/DL — SIGNIFICANT CHANGE UP (ref 0.2–1.2)
BLD GP AB SCN SERPL QL: SIGNIFICANT CHANGE UP
BUN SERPL-MCNC: 32 MG/DL — HIGH (ref 10–20)
CALCIUM SERPL-MCNC: 10.2 MG/DL — HIGH (ref 8.5–10.1)
CHLORIDE SERPL-SCNC: 102 MMOL/L — SIGNIFICANT CHANGE UP (ref 98–110)
CO2 SERPL-SCNC: 21 MMOL/L — SIGNIFICANT CHANGE UP (ref 17–32)
CREAT SERPL-MCNC: 1.3 MG/DL — SIGNIFICANT CHANGE UP (ref 0.7–1.5)
GLUCOSE SERPL-MCNC: 105 MG/DL — HIGH (ref 70–99)
HCT VFR BLD CALC: 49.6 % — SIGNIFICANT CHANGE UP (ref 42–52)
HGB BLD-MCNC: 15.6 G/DL — SIGNIFICANT CHANGE UP (ref 14–18)
INR BLD: 1.06 RATIO — SIGNIFICANT CHANGE UP (ref 0.65–1.3)
MAGNESIUM SERPL-MCNC: 1.9 MG/DL — SIGNIFICANT CHANGE UP (ref 1.8–2.4)
MCHC RBC-ENTMCNC: 26.4 PG — LOW (ref 27–31)
MCHC RBC-ENTMCNC: 31.5 G/DL — LOW (ref 32–37)
MCV RBC AUTO: 83.9 FL — SIGNIFICANT CHANGE UP (ref 80–94)
NRBC # BLD: 0 /100 WBCS — SIGNIFICANT CHANGE UP (ref 0–0)
PLATELET # BLD AUTO: 244 K/UL — SIGNIFICANT CHANGE UP (ref 130–400)
POTASSIUM SERPL-MCNC: 4.6 MMOL/L — SIGNIFICANT CHANGE UP (ref 3.5–5)
POTASSIUM SERPL-SCNC: 4.6 MMOL/L — SIGNIFICANT CHANGE UP (ref 3.5–5)
PROT SERPL-MCNC: 8 G/DL — SIGNIFICANT CHANGE UP (ref 6–8)
PROTHROM AB SERPL-ACNC: 12.2 SEC — SIGNIFICANT CHANGE UP (ref 9.95–12.87)
RBC # BLD: 5.91 M/UL — SIGNIFICANT CHANGE UP (ref 4.7–6.1)
RBC # FLD: 16 % — HIGH (ref 11.5–14.5)
SARS-COV-2 RNA SPEC QL NAA+PROBE: SIGNIFICANT CHANGE UP
SODIUM SERPL-SCNC: 142 MMOL/L — SIGNIFICANT CHANGE UP (ref 135–146)
WBC # BLD: 8.31 K/UL — SIGNIFICANT CHANGE UP (ref 4.8–10.8)
WBC # FLD AUTO: 8.31 K/UL — SIGNIFICANT CHANGE UP (ref 4.8–10.8)

## 2021-12-20 PROCEDURE — 93010 ELECTROCARDIOGRAM REPORT: CPT

## 2021-12-20 PROCEDURE — 99233 SBSQ HOSP IP/OBS HIGH 50: CPT

## 2021-12-20 RX ORDER — ALBUMIN HUMAN 25 %
3000 VIAL (ML) INTRAVENOUS ONCE
Refills: 0 | Status: DISCONTINUED | OUTPATIENT
Start: 2021-12-21 | End: 2021-12-24

## 2021-12-20 RX ORDER — METOPROLOL TARTRATE 50 MG
12.5 TABLET ORAL ONCE
Refills: 0 | Status: COMPLETED | OUTPATIENT
Start: 2021-12-20 | End: 2021-12-20

## 2021-12-20 RX ORDER — CHLORHEXIDINE GLUCONATE 213 G/1000ML
15 SOLUTION TOPICAL ONCE
Refills: 0 | Status: COMPLETED | OUTPATIENT
Start: 2021-12-20 | End: 2021-12-21

## 2021-12-20 RX ADMIN — AMIODARONE HYDROCHLORIDE 400 MILLIGRAM(S): 400 TABLET ORAL at 05:33

## 2021-12-20 RX ADMIN — Medication 81 MILLIGRAM(S): at 12:00

## 2021-12-20 RX ADMIN — Medication 50 MILLIGRAM(S): at 05:34

## 2021-12-20 RX ADMIN — PANTOPRAZOLE SODIUM 40 MILLIGRAM(S): 20 TABLET, DELAYED RELEASE ORAL at 05:35

## 2021-12-20 RX ADMIN — AMIODARONE HYDROCHLORIDE 400 MILLIGRAM(S): 400 TABLET ORAL at 19:27

## 2021-12-20 RX ADMIN — ENOXAPARIN SODIUM 40 MILLIGRAM(S): 100 INJECTION SUBCUTANEOUS at 12:00

## 2021-12-20 RX ADMIN — LISINOPRIL 5 MILLIGRAM(S): 2.5 TABLET ORAL at 05:33

## 2021-12-20 RX ADMIN — Medication 40 MILLIGRAM(S): at 05:33

## 2021-12-20 RX ADMIN — AMLODIPINE BESYLATE 5 MILLIGRAM(S): 2.5 TABLET ORAL at 05:33

## 2021-12-20 RX ADMIN — ATORVASTATIN CALCIUM 40 MILLIGRAM(S): 80 TABLET, FILM COATED ORAL at 22:04

## 2021-12-20 NOTE — PROGRESS NOTE ADULT - SUBJECTIVE AND OBJECTIVE BOX
Cardiac Surgery Pre-op Note:  CC: Patient is a 71y old  Male who presents with a chief complaint of AICD firing (20 Dec 2021 06:32)      Referring Physician:                                                                                             Surgeon: malia  Procedure: (Date) (Procedure) 21 CABG, replacement AICD leads and replacement of generator     Allergies    No Known Allergies    Intolerances      HPI:  72 yo male, h/p CAD s.p 5 stents , MI 2006 s.p AICD, HTN, DL, COPD not on home oxygen or bipap presented for AICD firing  Patient felt 3 episodes of AICD firing causing chest discomfort today staring 6 PM; No similar episode in the past  Last time he followed with EP, 8 months ago, he has been told that one of the leads might not be working  Upon ICD interrogation by cardiac fellow V tach 5x s.p shock was detected. Cc revealed MV CAD     ROS negative for syncope, dizziness, headache, fever , chills, N/V  or respiratory symptoms     in ED 97 F 104 bpm 132/77 mmHg 18  % (15 Dec 2021 22:55)      PAST MEDICAL & SURGICAL HISTORY:  COPD (chronic obstructive pulmonary disease)    CAD (coronary artery disease)    Myocardial infarction      CAD (coronary artery disease)  HAD CARDIAC CATH WITH STENTS     Hypercholesteremia    HTN (hypertension)    CAD (coronary artery disease)  CARDIAC CATH   AICD 2015    History of coronary artery stent placement    MEDICATIONS  (STANDING):  aMIOdarone    Tablet 400 milliGRAM(s) Oral two times a day  amLODIPine   Tablet 5 milliGRAM(s) Oral daily  aspirin enteric coated 81 milliGRAM(s) Oral daily  atorvastatin 40 milliGRAM(s) Oral at bedtime  budesonide 160 MICROgram(s)/formoterol 4.5 MICROgram(s) Inhaler 2 Puff(s) Inhalation two times a day  chlorhexidine 0.12% Liquid 15 milliLiter(s) Swish and Spit once  chlorhexidine 4% Liquid 1 Application(s) Topical daily  furosemide    Tablet 40 milliGRAM(s) Oral daily  metoprolol tartrate 12.5 milliGRAM(s) Oral once  pantoprazole    Tablet 40 milliGRAM(s) Oral before breakfast  sodium chloride 0.9%. 1000 milliLiter(s) (75 mL/Hr) IV Continuous <Continuous>    MEDICATIONS  (PRN):  albuterol/ipratropium for Nebulization 3 milliLiter(s) Nebulizer every 6 hours PRN Bronchospasm      Labs:                        15.6   8.31  )-----------( 244      ( 20 Dec 2021 11:00 )             49.6     Blood Type: O positive  HGB A1C: 5.7  Pro-BNP: Serum Pro-Brain Natriuretic Peptide: 777 pg/mL (12-15 @ 19:45)    Thyroid Panel:  @ 04:30/2.73  --/--/--    MRSA: MRSA PCR Result.: Negative ( @ 16:29)   / MSSA:   Urinalysis Basic - ( 18 Dec 2021 16:25 )    Color: Yellow / Appearance: Slightly Turbid / S.021 / pH: x  Gluc: x / Ketone: Trace  / Bili: Negative / Urobili: <2 mg/dL   Blood: x / Protein: 30 mg/dL / Nitrite: Negative   Leuk Esterase: Negative / RBC: 1 /HPF / WBC 3 /HPF   Sq Epi: x / Non Sq Epi: 4 /HPF / Bacteria: Negative    COVID PCR: 0- pending    < from: VA Duplex Carotid, Bilat (21 @ 14:59) >  IMPRESSION: Mild 20-39% stenosis in bilateral internal carotid arteries    < end of copied text >  < from: CT Abdomen and Pelvis No Cont (21 @ 09:24) >  Moderate degree of atherosclerotic calcifications of the abdominal aorta   and its branches.    Indeterminate 2.0 cm right interpolar renal lesion measuring higher than   simple fluid attenuation. Cannot differentiate between proteinaceous cyst   and solid lesion by CT. Recommend evaluation with outpatient   contrast-enhanced MRI.    < end of copied text >  < from: CT Chest No Cont (21 @ 09:23) >  No acute intrathoracic pathology.    Scattered left sided pulmonary nodules measuring up to 0.3 cm, as above.    No significant calcifications involving the ascending thoracic aorta.   Scattered calcifications involving the aortic arch.    Please see separately dictated report CT abdomen and pelvis performed   concurrently for intra-abdominal findings.    < end of copied text >  < from: Xray Chest 1 View- PORTABLE-Urgent (12.15.21 @ 22:26) >  No radiographic evidence of acute cardiopulmonary disease.    Bones as above.    Left-sided permanent pacemaker.    < end of copied text >  < from: 12 Lead ECG (12.15.21 @ 19:01) >    Ventricular Rate 118 BPM    Atrial Rate 118 BPM    P-R Interval 132 ms    QRS Duration 82 ms    Q-T Interval 326 ms    QTC Calculation(Bazett) 456 ms    P Axis 58 degrees    R Axis -32 degrees    T Axis 78 degrees    Diagnosis Line Sinus tachycardia with Premature atrial complexes with Aberrant conduction  Left axis deviation  Inferior infarct , age undetermined  Anterolateral infarct , age undetermined  Abnormal ECG    < end of copied text >  < from: TTE Echo Complete w/o Contrast w/ Doppler (21 @ 12:04) >  Summary:   1. Left ventricular ejection fraction, by visual estimation, is 30 to   35%.   2. Multiple left ventricular regional wall motion abnormalities exist.   See wall motion findings.   3. Spectral Doppler shows impaired relaxation pattern of left   ventricular myocardial filling (Grade I diastolic dysfunction).   4. Mildly enlarged left atrium.   5. Normal right atrial size.   6. Mild mitral valve regurgitation.   7. Mild thickening of the anterior and posterior mitral valve leaflets.   8. Moderate mitral annular calcification.   9. Mild tricuspid regurgitation.  10. Mild aortic regurgitation.  11. Sclerotic aortic valve with normal opening.    < end of copied text >    PFT's: FEV1 100% of predicted    blood pressure - right 103/73   left 109/71    five meter -  5.45 / 5.6 / 6.7    cardiac catheterization:      Coronary Dominance: Right dominate      LM: Mild Disease    LAD: Mild prox disease. 90% stenosis in mid LAD. 99% in stent stenosis distal LAD. D1 90% lesion, D2 99% lesion at the ostium. RCA supplied collateral to Apical LAD and diag    CX: Prox Moderate in-stent diffuse disease, 90% calcified lesion in distal Lcx. Moderate disease OM1    RCA: Mild diffuse disease    Gen: WN/WD NAD  Neuro: A&Ox3, nonfocal  Pulm: CTA B/L  CV: RRR, S1S2 - systolic murmur  Abd: Soft, NT, ND +BS  Ext: No edema, + peripheral pulses    Cardic Surgery Risk Factors  CVA and/or carotid/cerebrovascular disease. Yes  No  Explain if Yes  Aortoiliac disease Yes  No  Explain if Yes  Previous MI Yes  No  Explain if Yes  Previos Cardiac Surgery Yes  No  Explain if Yes  Hemodynamics-Unstable or Shock Yes  No  Explain if Yes  Diabetis Yes  No  Explain if Yes  Hepatic Failure Yes  No  Explain if Yes  Renal failure and/or dialysis Yes  No  Explain if Yes  Heart failure-type-present or past Yes  No  Explain if Yes  COPD Yes  No  Explain if Yes  Immune System Deficiency Yes  No  Explain if Yes  Malignant Ventricular Arrhythmia Yes  No  Explain if Yes    STS Score:   Isolated CAB  Risk of Mortality:  1.362%  Renal Failure:  0.675%  Permanent Stroke:  0.903%  Prolonged Ventilation:  5.845%  DSW Infection:  0.086%  Reoperation:  4.056%  Morbidity or Mortality:  9.698%  Short Length of Stay:  44.459%  Long Length of Stay:  4.086%        Pt has AICD/PPM [ ] Yes  [ ] No             Brand Name:  Pre-op Beta Blocker ordered within 24 hrs of surgery (CABG ONLY)?  [x ] Yes  [ ] No  If not, Why?  Type & Cross  [ ] Yes    NPO after Midnight [x ] Yes  [ ] No  Hibiclens/Peridex ordered [x ] Yes  [ ] No  Intraop on Hold: PRBCs, CXR, LISA [x ]   Consent obtained  [x ] Yes  [ ] No

## 2021-12-20 NOTE — PRE-ANESTHESIA EVALUATION ADULT - NSANTHOSAYNRD_GEN_A_CORE
No. JEEVAN screening performed.  STOP BANG Legend: 0-2 = LOW Risk; 3-4 = INTERMEDIATE Risk; 5-8 = HIGH Risk

## 2021-12-20 NOTE — PROGRESS NOTE ADULT - SUBJECTIVE AND OBJECTIVE BOX
ZUNILDA DENNIS 71y Male  MRN#: [id number]   CODE STATUS:________    Hospital Day: 5d    Pt is currently admitted with the primary diagnosis of     SUBJECTIVE  Hospital Course    Overnight events     Subjective complaints     Present Today:   - Oshea:  No [  ], Yes [   ] : Indication:     - Type of IV Access:       .. CVC/Piccline:  No [  ], Yes [   ] : Indication:       .. Midline: No [  ], Yes [   ] : Indication:                                              OBJECTIVE  PAST MEDICAL & SURGICAL HISTORY  COPD (chronic obstructive pulmonary disease)    CAD (coronary artery disease)    Myocardial infarction      CAD (coronary artery disease)  HAD CARDIAC CATH WITH STENTS     Hypercholesteremia    HTN (hypertension)    CAD (coronary artery disease)  CARDIAC CATH   AICD     History of coronary artery stent placement                                                ALLERGIES:  No Known Allergies                           HOME MEDICATIONS  Home Medications:  amLODIPine 5 mg oral tablet: 1 tab(s) orally once a day (10 Feb 2020 17:27)  aspirin 81 mg oral tablet, chewable: 1 tab(s) orally once a day (10 Feb 2020 17:27)  atorvastatin 40 mg oral tablet: 1 tab(s) orally once a day (10 Feb 2020 17:27)  benazepril 5 mg oral tablet: 1 tab(s) orally once a day (10 Feb 2020 17:)  Metoprolol Succinate ER 50 mg oral tablet, extended release: 1 tab(s) orally once a day (10 Feb 2020 17:)  Nitrostat 0.3 mg sublingual tablet: 1 tab(s) sublingual every 5 minutes, As Needed (10 Feb 2020 17:)  Plavix 75 mg oral tablet: 1 tab(s) orally once a day (10 Feb 2020 17:27)  Ventolin HFA 90 mcg/inh inhalation aerosol: 2 puff(s) inhaled 4 times a day, As Needed (10 Feb 2020 17:27)                           MEDICATIONS:  STANDING MEDICATIONS  aMIOdarone    Tablet 400 milliGRAM(s) Oral two times a day  amLODIPine   Tablet 5 milliGRAM(s) Oral daily  aspirin enteric coated 81 milliGRAM(s) Oral daily  atorvastatin 40 milliGRAM(s) Oral at bedtime  budesonide 160 MICROgram(s)/formoterol 4.5 MICROgram(s) Inhaler 2 Puff(s) Inhalation two times a day  chlorhexidine 4% Liquid 1 Application(s) Topical daily  enoxaparin Injectable 40 milliGRAM(s) SubCutaneous daily  furosemide    Tablet 40 milliGRAM(s) Oral daily  lisinopril 5 milliGRAM(s) Oral daily  metoprolol succinate ER 50 milliGRAM(s) Oral daily  pantoprazole    Tablet 40 milliGRAM(s) Oral before breakfast  sodium chloride 0.9%. 1000 milliLiter(s) IV Continuous <Continuous>    PRN MEDICATIONS  albuterol/ipratropium for Nebulization 3 milliLiter(s) Nebulizer every 6 hours PRN                                            ------------------------------------------------------------  VITAL SIGNS: Last 24 Hours  T(C): 36.1 (19 Dec 2021 21:33), Max: 36.3 (19 Dec 2021 13:11)  T(F): 96.9 (19 Dec 2021 21:33), Max: 97.3 (19 Dec 2021 13:11)  HR: 78 (20 Dec 2021 05:04) (66 - 78)  BP: 121/74 (20 Dec 2021 05:04) (121/74 - 144/76)  BP(mean): --  RR: 18 (20 Dec 2021 05:04) (18 - 20)  SpO2: 96% (19 Dec 2021 21:33) (96% - 96%)      21 @ 07:01  -  21 @ 07:00  --------------------------------------------------------  IN: 300 mL / OUT: 700 mL / NET: -400 mL    21 @ 07:01  -  21 @ 06:32  --------------------------------------------------------  IN: 460 mL / OUT: 0 mL / NET: 460 mL                                               LABS:            Urinalysis Basic - ( 18 Dec 2021 16:25 )    Color: Yellow / Appearance: Slightly Turbid / S.021 / pH: x  Gluc: x / Ketone: Trace  / Bili: Negative / Urobili: <2 mg/dL   Blood: x / Protein: 30 mg/dL / Nitrite: Negative   Leuk Esterase: Negative / RBC: 1 /HPF / WBC 3 /HPF   Sq Epi: x / Non Sq Epi: 4 /HPF / Bacteria: Negative                                                            RADIOLOGY:        PHYSICAL EXAM:  GENERAL: NAD, lying in bed comfortably  HEAD:  Atraumatic, Normocephalic  EYES: EOMI, PERRLA, conjunctiva and sclera clear  ENT: Moist mucous membranes  NECK: Supple, No JVD  CHEST/LUNG: Clear to auscultation bilaterally; No rales, rhonchi, wheezing, or rubs. Unlabored respirations  HEART: Regular rate and rhythm; No murmurs, rubs, or gallops  ABDOMEN: BSx4; Soft, nontender, nondistended  EXTREMITIES:  2+ Peripheral Pulses, brisk capillary refill. No clubbing, cyanosis, or edema  NERVOUS SYSTEM:  A&Ox3, no focal deficits   SKIN: No rashes or lesions                                         ASSESSMENT & PLAN    Past medical history and hospital course                                                                                                           ----------------------------------------------------  # DVT prophylaxis     # GI prophylaxis     # Diet     # Activity Score (AM-PAC)    # Code status     # Disposition                                                                              --------------------------------------------------------    # Handoff      ZUNILDA DENNIS 71y Male  MRN#: [id number]   CODE STATUS:________    Hospital Day: 5d    Pt is currently admitted with the primary diagnosis of     SUBJECTIVE  Hospital Course  70 yo male, h/p CAD s.p 5 stents , MI 2006 s.p AICD, HTN, DL, COPD not on home oxygen or bipap presented for AICD firing  Patient felt 3 episodes of AICD firing causing chest discomfort today staring 6 PM; No similar episode in the past  Last time he followed with EP, 8 months ago, he has been told that one of the leads might not be working  Upon ICD interrogation by cardiac fellow V tach 5x s.p shock was detected   ROS negative for syncope, dizziness, headache, fever , chills, N/V  or respiratory symptoms     in ED 97 F 104 bpm 132/77 mmHg 18  %    Overnight events   No overnight events   No telemetry events except for NSVT    Subjective complaints   patient is feeling fine this morning, mildly anxious and reports mild palpitations but otherwise he has no complaints.   He denies chest pain, SOB, dizziness, lightheadedness and does not report any episodes of AICD shock     Present Today:   - Dorie:  No [ X ], Yes [   ] : Indication:                                            OBJECTIVE  PAST MEDICAL & SURGICAL HISTORY  COPD (chronic obstructive pulmonary disease)    CAD (coronary artery disease)    Myocardial infarction      CAD (coronary artery disease)  HAD CARDIAC CATH WITH STENTS     Hypercholesteremia    HTN (hypertension)    CAD (coronary artery disease)  CARDIAC CATH 2006  AICD 2015    History of coronary artery stent placement                                                ALLERGIES:  No Known Allergies                           HOME MEDICATIONS  Home Medications:  amLODIPine 5 mg oral tablet: 1 tab(s) orally once a day (10 Feb 2020 17:27)  aspirin 81 mg oral tablet, chewable: 1 tab(s) orally once a day (10 Feb 2020 17:27)  atorvastatin 40 mg oral tablet: 1 tab(s) orally once a day (10 Feb 2020 17:27)  benazepril 5 mg oral tablet: 1 tab(s) orally once a day (10 Feb 2020 17:27)  Metoprolol Succinate ER 50 mg oral tablet, extended release: 1 tab(s) orally once a day (10 Feb 2020 17:27)  Nitrostat 0.3 mg sublingual tablet: 1 tab(s) sublingual every 5 minutes, As Needed (10 Feb 2020 17:27)  Plavix 75 mg oral tablet: 1 tab(s) orally once a day (10 Feb 2020 17:27)  Ventolin HFA 90 mcg/inh inhalation aerosol: 2 puff(s) inhaled 4 times a day, As Needed (10 Feb 2020 17:27)                           MEDICATIONS:  STANDING MEDICATIONS  aMIOdarone    Tablet 400 milliGRAM(s) Oral two times a day  amLODIPine   Tablet 5 milliGRAM(s) Oral daily  aspirin enteric coated 81 milliGRAM(s) Oral daily  atorvastatin 40 milliGRAM(s) Oral at bedtime  budesonide 160 MICROgram(s)/formoterol 4.5 MICROgram(s) Inhaler 2 Puff(s) Inhalation two times a day  chlorhexidine 4% Liquid 1 Application(s) Topical daily  enoxaparin Injectable 40 milliGRAM(s) SubCutaneous daily  furosemide    Tablet 40 milliGRAM(s) Oral daily  lisinopril 5 milliGRAM(s) Oral daily  metoprolol succinate ER 50 milliGRAM(s) Oral daily  pantoprazole    Tablet 40 milliGRAM(s) Oral before breakfast  sodium chloride 0.9%. 1000 milliLiter(s) IV Continuous <Continuous>    PRN MEDICATIONS  albuterol/ipratropium for Nebulization 3 milliLiter(s) Nebulizer every 6 hours PRN                                            ------------------------------------------------------------  VITAL SIGNS: Last 24 Hours  T(C): 36.1 (19 Dec 2021 21:33), Max: 36.3 (19 Dec 2021 13:11)  T(F): 96.9 (19 Dec 2021 21:33), Max: 97.3 (19 Dec 2021 13:11)  HR: 78 (20 Dec 2021 05:04) (66 - 78)  BP: 121/74 (20 Dec 2021 05:04) (121/74 - 144/76)  BP(mean): --  RR: 18 (20 Dec 2021 05:04) (18 - 20)  SpO2: 96% (19 Dec 2021 21:33) (96% - 96%)      21 @ 07:01  -  21 @ 07:00  --------------------------------------------------------  IN: 300 mL / OUT: 700 mL / NET: -400 mL    21 @ 07:01  -  21 @ 06:32  --------------------------------------------------------  IN: 460 mL / OUT: 0 mL / NET: 460 mL                                               LABS:            Urinalysis Basic - ( 18 Dec 2021 16:25 )    Color: Yellow / Appearance: Slightly Turbid / S.021 / pH: x  Gluc: x / Ketone: Trace  / Bili: Negative / Urobili: <2 mg/dL   Blood: x / Protein: 30 mg/dL / Nitrite: Negative   Leuk Esterase: Negative / RBC: 1 /HPF / WBC 3 /HPF   Sq Epi: x / Non Sq Epi: 4 /HPF / Bacteria: Negative                                                            RADIOLOGY:        PHYSICAL EXAM:  GENERAL: NAD, lying in bed comfortably  NECK: Supple, No JVD  CHEST/LUNG: Clear to auscultation bilaterally; No rales, rhonchi, wheezing, or rubs. Unlabored respirations  HEART: Regular rate and rhythm; No murmurs, rubs, or gallops  ABDOMEN: BSx4; Soft, nontender, nondistended  EXTREMITIES:  2+ Peripheral Pulses, brisk capillary refill. No clubbing, cyanosis, or edema  NERVOUS SYSTEM:  A&Ox3, no focal deficits   SKIN: No rashes or lesions                                         ASSESSMENT & PLAN    70 yo male, h/p CAD s.p 5 stents , MI 2006 s.p AICD, HTN, DL, COPD not on home oxygen or bipap presented for AICD firing    # V tach with AICD firing  - EP consult--->may need lead replacement, echo shows EF 30-35%; f/u EP outpatient  - ICD interrogated by cardiac fellow: 5 episodes of V Tach 5 episodes of firing  - possible lead defect  - cardiac catheterization on 2021 revealed severe 2vCAD. CABG and ICD lead and generator replacement next Tuesday or Wednesday  - keep K > 4 and Mg > 2  -continue toprol, lisinopril  - CABG possibly for tomrorow    # CAD s/p 5 stents/HTN/DLD  -continue home meds; home benazepril switched to lisinopril  -HDL 34 on lipid panel; TSH normal  -cath showed 2 vessel disease, pt scheduled for CABG with CT surgery next week    # COPD not currently in exacerbation, current smoker  -continue duonebs, symbicort  - no wheezes on auscultation  - not on home oxygen or Bipap                                                                              ----------------------------------------------------  # DVT prophylaxis - Lovenox     # GI prophylaxis - pantoprazole    # Diet - DASH     # Activity - IAT   No PT eval -> consulted    # Code status -Full code    # Disposition - Acute                                                                             --------------------------------------------------------    # Handoff

## 2021-12-20 NOTE — PRE-ANESTHESIA EVALUATION ADULT - NSRADCARDRESULTSFT_GEN_ALL_CORE
- EP consult--->may need lead replacement, echo shows EF 30-35%; f/u EP outpatient  - ICD interrogated by cardiac fellow: 5 episodes of V Tach 5 episodes of firing--> possible lead defect  - cardiac catheterization on 12/16/2021 revealed severe 2vCAD(LAD: Mild prox disease, 90% stenosis in mid LAD, 99% in stent stenosis distal LAD. D1 90% lesion, D2 99% lesion at the ostium. CX: Prox Moderate in-stent diffuse disease, 90% calcified lesion in distal Lcx. Moderate disease OM1)    ECHO;  1. Left ventricular ejection fraction, by visual estimation, is 30 to   35%.   2. Multiple left ventricular regional wall motion abnormalities exist.   The apical septal segment, apical anterior segment, and apex are   akinetic. The  mid anteroseptal segment, apical lateral segment, mid anterior segment,   and  apical inferior segment are hypokinetic. All remaining scored segments are  normal.     3. Spectral Doppler shows impaired relaxation pattern of left   ventricular myocardial filling (Grade I diastolic dysfunction).   4. Mildly enlarged left atrium.   5. Normal right atrial size.   6. Mild mitral valve regurgitation.   7. Mild thickening of the anterior and posterior mitral valve leaflets.   8. Moderate mitral annular calcification.   9. Mild tricuspid regurgitation.  10. Mild aortic regurgitation.  11. Sclerotic aortic valve with normal opening.    EKG:  Sinus tachycardia with Premature atrial complexes with Aberrant conduction        CAROTID DUPLEX COMPLETE BILAT: Mild 20-39% stenosis in bilateral internal carotid arteries

## 2021-12-20 NOTE — PROGRESS NOTE ADULT - ATTENDING COMMENTS
***My note supersedes any discrepancies that may be above in the resident's note***    72 yo male, h/p CAD s.p 5 stents , MI 2006 s.p AICD, HTN, DL, COPD not on home oxygen or bipap presented for AICD firing    # V tach with AICD firing  - EP consult--->may need lead replacement, echo shows EF 30-35%; f/u EP outpatient  - ICD interrogated by cardiac fellow: 5 episodes of V Tach 5 episodes of firing--> possible lead defect  - cardiac catheterization on 12/16/2021 revealed severe 2vCAD(LAD: Mild prox disease, 90% stenosis in mid LAD, 99% in stent stenosis distal LAD. D1 90% lesion, D2 99% lesion at the ostium. RCA supplied collateral to Apical LAD and diag    CX: Prox Moderate in-stent diffuse disease, 90% calcified lesion in distal Lcx. Moderate disease OM1    RCA: Mild diffuse disease    - CABG and ICD lead and generator replacement next Tuesday or Wednesday  - keep K > 4 and Mg > 2  -continue toprol, lisinopril    # CAD s/p 5 stents/HTN/DLD  -continue home meds; home benazepril switched to lisinopril  -HDL 34 on lipid panel; TSH normal  -cath showed 2 vessel disease, pt scheduled for CABG with CT surgery next week    # COPD not currently in exacerbation, current smoker  -continue duonebs, symbicort  - no wheezes on auscultation  - not on home oxygen or Bipap    Misc:  # DVT ppx: lovenox  # GI ppx: protonix  # Diet: DASH  # Activity: AAT  # Cide: Full code  #Dispo: acute ***My note supersedes any discrepancies that may be above in the resident's note***    72 yo male, h/p CAD s.p 5 stents , MI 2006 s.p AICD, HTN, DL, COPD not on home oxygen or bipap presented for AICD firing    # V tach with AICD firing  - EP consult--->may need lead replacement, echo shows EF 30-35%; f/u EP outpatient  - ICD interrogated by cardiac fellow: 5 episodes of V Tach 5 episodes of firing--> possible lead defect  - cardiac catheterization on 12/16/2021 revealed severe 2vCAD(LAD: Mild prox disease, 90% stenosis in mid LAD, 99% in stent stenosis distal LAD. D1 90% lesion, D2 99% lesion at the ostium. CX: Prox Moderate in-stent diffuse disease, 90% calcified lesion in distal Lcx. Moderate disease OM1)  - CABG and ICD lead and generator replacement next Tuesday or Wednesday  - keep K > 4 and Mg > 2  - continue toprol, lisinopril    # CAD s/p 5 stents  # DLD  -continue home meds;   -HDL 34 on lipid panel; TSH normal  -cath showed 2 vessel disease, pt scheduled for CABG with CT surgery next week    # HTN  - home benazepril switched to lisinopril    # COPD not currently in exacerbation, current smoker  -continue duonebs, symbicort  - no wheezes on auscultation  - not on home oxygen or Bipap    Misc:  # DVT ppx: lovenox  # GI ppx: protonix  # Diet: DASH  # Activity: AAT  # Cide: Full code  #Dispo: acute    #Progress Note Handoff  Pending (specify):  CABG with CT surgery   Family discussion: patient updated and in agreement of plan. all questions answered  Disposition: Unknown at this time________

## 2021-12-21 ENCOUNTER — TRANSCRIPTION ENCOUNTER (OUTPATIENT)
Age: 71
End: 2021-12-21

## 2021-12-21 LAB
ALBUMIN SERPL ELPH-MCNC: 4.4 G/DL — SIGNIFICANT CHANGE UP (ref 3.5–5.2)
ALBUMIN SERPL ELPH-MCNC: 4.6 G/DL — SIGNIFICANT CHANGE UP (ref 3.5–5.2)
ALP SERPL-CCNC: 32 U/L — SIGNIFICANT CHANGE UP (ref 30–115)
ALP SERPL-CCNC: 64 U/L — SIGNIFICANT CHANGE UP (ref 30–115)
ALT FLD-CCNC: 16 U/L — SIGNIFICANT CHANGE UP (ref 0–41)
ALT FLD-CCNC: 9 U/L — SIGNIFICANT CHANGE UP (ref 0–41)
ANION GAP SERPL CALC-SCNC: 16 MMOL/L — HIGH (ref 7–14)
ANION GAP SERPL CALC-SCNC: 16 MMOL/L — HIGH (ref 7–14)
APTT BLD: 29 SEC — SIGNIFICANT CHANGE UP (ref 27–39.2)
AST SERPL-CCNC: 15 U/L — SIGNIFICANT CHANGE UP (ref 0–41)
AST SERPL-CCNC: 22 U/L — SIGNIFICANT CHANGE UP (ref 0–41)
BASOPHILS # BLD AUTO: 0.05 K/UL — SIGNIFICANT CHANGE UP (ref 0–0.2)
BASOPHILS NFR BLD AUTO: 0.7 % — SIGNIFICANT CHANGE UP (ref 0–1)
BILIRUB SERPL-MCNC: 0.5 MG/DL — SIGNIFICANT CHANGE UP (ref 0.2–1.2)
BILIRUB SERPL-MCNC: 0.7 MG/DL — SIGNIFICANT CHANGE UP (ref 0.2–1.2)
BUN SERPL-MCNC: 35 MG/DL — HIGH (ref 10–20)
BUN SERPL-MCNC: 36 MG/DL — HIGH (ref 10–20)
CALCIUM SERPL-MCNC: 8.5 MG/DL — SIGNIFICANT CHANGE UP (ref 8.5–10.1)
CALCIUM SERPL-MCNC: 9.7 MG/DL — SIGNIFICANT CHANGE UP (ref 8.5–10.1)
CHLORIDE SERPL-SCNC: 109 MMOL/L — SIGNIFICANT CHANGE UP (ref 98–110)
CHLORIDE SERPL-SCNC: 109 MMOL/L — SIGNIFICANT CHANGE UP (ref 98–110)
CO2 SERPL-SCNC: 18 MMOL/L — SIGNIFICANT CHANGE UP (ref 17–32)
CO2 SERPL-SCNC: 19 MMOL/L — SIGNIFICANT CHANGE UP (ref 17–32)
CREAT SERPL-MCNC: 1.3 MG/DL — SIGNIFICANT CHANGE UP (ref 0.7–1.5)
CREAT SERPL-MCNC: 1.5 MG/DL — SIGNIFICANT CHANGE UP (ref 0.7–1.5)
EOSINOPHIL # BLD AUTO: 0.13 K/UL — SIGNIFICANT CHANGE UP (ref 0–0.7)
EOSINOPHIL NFR BLD AUTO: 1.7 % — SIGNIFICANT CHANGE UP (ref 0–8)
GAS PNL BLDA: SIGNIFICANT CHANGE UP
GLUCOSE BLDC GLUCOMTR-MCNC: 120 MG/DL — HIGH (ref 70–99)
GLUCOSE BLDC GLUCOMTR-MCNC: 153 MG/DL — HIGH (ref 70–99)
GLUCOSE BLDC GLUCOMTR-MCNC: 157 MG/DL — HIGH (ref 70–99)
GLUCOSE BLDC GLUCOMTR-MCNC: 165 MG/DL — HIGH (ref 70–99)
GLUCOSE BLDC GLUCOMTR-MCNC: 71 MG/DL — SIGNIFICANT CHANGE UP (ref 70–99)
GLUCOSE BLDC GLUCOMTR-MCNC: 93 MG/DL — SIGNIFICANT CHANGE UP (ref 70–99)
GLUCOSE BLDC GLUCOMTR-MCNC: 94 MG/DL — SIGNIFICANT CHANGE UP (ref 70–99)
GLUCOSE SERPL-MCNC: 163 MG/DL — HIGH (ref 70–99)
GLUCOSE SERPL-MCNC: 84 MG/DL — SIGNIFICANT CHANGE UP (ref 70–99)
HCT VFR BLD CALC: 28.3 % — LOW (ref 42–52)
HCT VFR BLD CALC: 46.8 % — SIGNIFICANT CHANGE UP (ref 42–52)
HGB BLD-MCNC: 14.5 G/DL — SIGNIFICANT CHANGE UP (ref 14–18)
HGB BLD-MCNC: 9 G/DL — LOW (ref 14–18)
IMM GRANULOCYTES NFR BLD AUTO: 0.5 % — HIGH (ref 0.1–0.3)
INR BLD: 1.59 RATIO — HIGH (ref 0.65–1.3)
LYMPHOCYTES # BLD AUTO: 1.13 K/UL — LOW (ref 1.2–3.4)
LYMPHOCYTES # BLD AUTO: 15.1 % — LOW (ref 20.5–51.1)
MAGNESIUM SERPL-MCNC: 1.7 MG/DL — LOW (ref 1.8–2.4)
MAGNESIUM SERPL-MCNC: 1.9 MG/DL — SIGNIFICANT CHANGE UP (ref 1.8–2.4)
MCHC RBC-ENTMCNC: 26.4 PG — LOW (ref 27–31)
MCHC RBC-ENTMCNC: 26.7 PG — LOW (ref 27–31)
MCHC RBC-ENTMCNC: 31 G/DL — LOW (ref 32–37)
MCHC RBC-ENTMCNC: 31.8 G/DL — LOW (ref 32–37)
MCV RBC AUTO: 84 FL — SIGNIFICANT CHANGE UP (ref 80–94)
MCV RBC AUTO: 85.2 FL — SIGNIFICANT CHANGE UP (ref 80–94)
MONOCYTES # BLD AUTO: 1 K/UL — HIGH (ref 0.1–0.6)
MONOCYTES NFR BLD AUTO: 13.4 % — HIGH (ref 1.7–9.3)
NEUTROPHILS # BLD AUTO: 5.12 K/UL — SIGNIFICANT CHANGE UP (ref 1.4–6.5)
NEUTROPHILS NFR BLD AUTO: 68.6 % — SIGNIFICANT CHANGE UP (ref 42.2–75.2)
NRBC # BLD: 0 /100 WBCS — SIGNIFICANT CHANGE UP (ref 0–0)
NRBC # BLD: 0 /100 WBCS — SIGNIFICANT CHANGE UP (ref 0–0)
PLATELET # BLD AUTO: 141 K/UL — SIGNIFICANT CHANGE UP (ref 130–400)
PLATELET # BLD AUTO: 223 K/UL — SIGNIFICANT CHANGE UP (ref 130–400)
POTASSIUM SERPL-MCNC: 4.6 MMOL/L — SIGNIFICANT CHANGE UP (ref 3.5–5)
POTASSIUM SERPL-MCNC: 5 MMOL/L — SIGNIFICANT CHANGE UP (ref 3.5–5)
POTASSIUM SERPL-SCNC: 4.6 MMOL/L — SIGNIFICANT CHANGE UP (ref 3.5–5)
POTASSIUM SERPL-SCNC: 5 MMOL/L — SIGNIFICANT CHANGE UP (ref 3.5–5)
PROT SERPL-MCNC: 5.9 G/DL — LOW (ref 6–8)
PROT SERPL-MCNC: 7.3 G/DL — SIGNIFICANT CHANGE UP (ref 6–8)
PROTHROM AB SERPL-ACNC: 18.2 SEC — HIGH (ref 9.95–12.87)
RBC # BLD: 3.37 M/UL — LOW (ref 4.7–6.1)
RBC # BLD: 5.49 M/UL — SIGNIFICANT CHANGE UP (ref 4.7–6.1)
RBC # FLD: 15.9 % — HIGH (ref 11.5–14.5)
RBC # FLD: 16.4 % — HIGH (ref 11.5–14.5)
SODIUM SERPL-SCNC: 143 MMOL/L — SIGNIFICANT CHANGE UP (ref 135–146)
SODIUM SERPL-SCNC: 144 MMOL/L — SIGNIFICANT CHANGE UP (ref 135–146)
WBC # BLD: 18.5 K/UL — HIGH (ref 4.8–10.8)
WBC # BLD: 7.47 K/UL — SIGNIFICANT CHANGE UP (ref 4.8–10.8)
WBC # FLD AUTO: 18.5 K/UL — HIGH (ref 4.8–10.8)
WBC # FLD AUTO: 7.47 K/UL — SIGNIFICANT CHANGE UP (ref 4.8–10.8)

## 2021-12-21 PROCEDURE — 71045 X-RAY EXAM CHEST 1 VIEW: CPT | Mod: 26,77

## 2021-12-21 PROCEDURE — 33533 CABG ARTERIAL SINGLE: CPT | Mod: 80

## 2021-12-21 PROCEDURE — 33533 CABG ARTERIAL SINGLE: CPT

## 2021-12-21 PROCEDURE — 33518 CABG ARTERY-VEIN TWO: CPT

## 2021-12-21 PROCEDURE — 33518 CABG ARTERY-VEIN TWO: CPT | Mod: 80

## 2021-12-21 PROCEDURE — 33508 ENDOSCOPIC VEIN HARVEST: CPT | Mod: 59

## 2021-12-21 PROCEDURE — 33508 ENDOSCOPIC VEIN HARVEST: CPT | Mod: 80,59

## 2021-12-21 PROCEDURE — 71045 X-RAY EXAM CHEST 1 VIEW: CPT | Mod: 26

## 2021-12-21 PROCEDURE — 33249 INSJ/RPLCMT DEFIB W/LEAD(S): CPT

## 2021-12-21 RX ORDER — MAGNESIUM SULFATE 500 MG/ML
1 VIAL (ML) INJECTION ONCE
Refills: 0 | Status: COMPLETED | OUTPATIENT
Start: 2021-12-21 | End: 2021-12-21

## 2021-12-21 RX ORDER — POLYETHYLENE GLYCOL 3350 17 G/17G
17 POWDER, FOR SOLUTION ORAL DAILY
Refills: 0 | Status: DISCONTINUED | OUTPATIENT
Start: 2021-12-21 | End: 2021-12-25

## 2021-12-21 RX ORDER — OXYCODONE HYDROCHLORIDE 5 MG/1
10 TABLET ORAL EVERY 4 HOURS
Refills: 0 | Status: DISCONTINUED | OUTPATIENT
Start: 2021-12-21 | End: 2021-12-25

## 2021-12-21 RX ORDER — CHLORHEXIDINE GLUCONATE 213 G/1000ML
5 SOLUTION TOPICAL
Refills: 0 | Status: DISCONTINUED | OUTPATIENT
Start: 2021-12-21 | End: 2021-12-21

## 2021-12-21 RX ORDER — NOREPINEPHRINE BITARTRATE/D5W 8 MG/250ML
0.05 PLASTIC BAG, INJECTION (ML) INTRAVENOUS
Qty: 8 | Refills: 0 | Status: DISCONTINUED | OUTPATIENT
Start: 2021-12-21 | End: 2021-12-22

## 2021-12-21 RX ORDER — CEFAZOLIN SODIUM 1 G
2000 VIAL (EA) INJECTION EVERY 8 HOURS
Refills: 0 | Status: COMPLETED | OUTPATIENT
Start: 2021-12-21 | End: 2021-12-22

## 2021-12-21 RX ORDER — SODIUM BICARBONATE 1 MEQ/ML
50 SYRINGE (ML) INTRAVENOUS ONCE
Refills: 0 | Status: COMPLETED | OUTPATIENT
Start: 2021-12-21 | End: 2021-12-21

## 2021-12-21 RX ORDER — MILRINONE LACTATE 1 MG/ML
0.25 INJECTION, SOLUTION INTRAVENOUS
Qty: 20 | Refills: 0 | Status: DISCONTINUED | OUTPATIENT
Start: 2021-12-21 | End: 2021-12-23

## 2021-12-21 RX ORDER — ASPIRIN/CALCIUM CARB/MAGNESIUM 324 MG
300 TABLET ORAL ONCE
Refills: 0 | Status: COMPLETED | OUTPATIENT
Start: 2021-12-21 | End: 2021-12-21

## 2021-12-21 RX ORDER — ALBUTEROL 90 UG/1
2 AEROSOL, METERED ORAL EVERY 6 HOURS
Refills: 0 | Status: DISCONTINUED | OUTPATIENT
Start: 2021-12-21 | End: 2021-12-22

## 2021-12-21 RX ORDER — FAMOTIDINE 10 MG/ML
20 INJECTION INTRAVENOUS EVERY 12 HOURS
Refills: 0 | Status: DISCONTINUED | OUTPATIENT
Start: 2021-12-21 | End: 2021-12-22

## 2021-12-21 RX ORDER — VASOPRESSIN 20 [USP'U]/ML
0.04 INJECTION INTRAVENOUS
Qty: 50 | Refills: 0 | Status: DISCONTINUED | OUTPATIENT
Start: 2021-12-21 | End: 2021-12-22

## 2021-12-21 RX ORDER — ASPIRIN/CALCIUM CARB/MAGNESIUM 324 MG
300 TABLET ORAL ONCE
Refills: 0 | Status: COMPLETED | OUTPATIENT
Start: 2021-12-21

## 2021-12-21 RX ORDER — ASPIRIN/CALCIUM CARB/MAGNESIUM 324 MG
325 TABLET ORAL DAILY
Refills: 0 | Status: DISCONTINUED | OUTPATIENT
Start: 2021-12-21 | End: 2021-12-25

## 2021-12-21 RX ORDER — INSULIN HUMAN 100 [IU]/ML
10 INJECTION, SOLUTION SUBCUTANEOUS
Qty: 100 | Refills: 0 | Status: DISCONTINUED | OUTPATIENT
Start: 2021-12-21 | End: 2021-12-22

## 2021-12-21 RX ORDER — DEXMEDETOMIDINE HYDROCHLORIDE IN 0.9% SODIUM CHLORIDE 4 UG/ML
1 INJECTION INTRAVENOUS
Qty: 200 | Refills: 0 | Status: DISCONTINUED | OUTPATIENT
Start: 2021-12-21 | End: 2021-12-22

## 2021-12-21 RX ORDER — CLOPIDOGREL BISULFATE 75 MG/1
75 TABLET, FILM COATED ORAL DAILY
Refills: 0 | Status: DISCONTINUED | OUTPATIENT
Start: 2021-12-21 | End: 2021-12-25

## 2021-12-21 RX ORDER — NITROGLYCERIN 6.5 MG
30 CAPSULE, EXTENDED RELEASE ORAL
Qty: 50 | Refills: 0 | Status: DISCONTINUED | OUTPATIENT
Start: 2021-12-21 | End: 2021-12-22

## 2021-12-21 RX ORDER — MEPERIDINE HYDROCHLORIDE 50 MG/ML
25 INJECTION INTRAMUSCULAR; INTRAVENOUS; SUBCUTANEOUS ONCE
Refills: 0 | Status: DISCONTINUED | OUTPATIENT
Start: 2021-12-21 | End: 2021-12-22

## 2021-12-21 RX ORDER — IPRATROPIUM BROMIDE 0.2 MG/ML
2 SOLUTION, NON-ORAL INHALATION EVERY 6 HOURS
Refills: 0 | Status: DISCONTINUED | OUTPATIENT
Start: 2021-12-21 | End: 2021-12-22

## 2021-12-21 RX ORDER — ACETAMINOPHEN 500 MG
1000 TABLET ORAL ONCE
Refills: 0 | Status: COMPLETED | OUTPATIENT
Start: 2021-12-21 | End: 2021-12-21

## 2021-12-21 RX ORDER — SODIUM CHLORIDE 9 MG/ML
1000 INJECTION INTRAMUSCULAR; INTRAVENOUS; SUBCUTANEOUS
Refills: 0 | Status: DISCONTINUED | OUTPATIENT
Start: 2021-12-21 | End: 2021-12-23

## 2021-12-21 RX ORDER — DEXTROSE 50 % IN WATER 50 %
25 SYRINGE (ML) INTRAVENOUS
Refills: 0 | Status: DISCONTINUED | OUTPATIENT
Start: 2021-12-21 | End: 2021-12-22

## 2021-12-21 RX ORDER — PROPOFOL 10 MG/ML
30 INJECTION, EMULSION INTRAVENOUS
Qty: 1000 | Refills: 0 | Status: DISCONTINUED | OUTPATIENT
Start: 2021-12-21 | End: 2021-12-22

## 2021-12-21 RX ORDER — DEXTROSE 50 % IN WATER 50 %
50 SYRINGE (ML) INTRAVENOUS
Refills: 0 | Status: DISCONTINUED | OUTPATIENT
Start: 2021-12-21 | End: 2021-12-22

## 2021-12-21 RX ORDER — OXYCODONE HYDROCHLORIDE 5 MG/1
5 TABLET ORAL EVERY 6 HOURS
Refills: 0 | Status: DISCONTINUED | OUTPATIENT
Start: 2021-12-21 | End: 2021-12-23

## 2021-12-21 RX ORDER — ONDANSETRON 8 MG/1
4 TABLET, FILM COATED ORAL ONCE
Refills: 0 | Status: COMPLETED | OUTPATIENT
Start: 2021-12-21 | End: 2021-12-21

## 2021-12-21 RX ORDER — NICARDIPINE HYDROCHLORIDE 30 MG/1
5 CAPSULE, EXTENDED RELEASE ORAL
Qty: 40 | Refills: 0 | Status: DISCONTINUED | OUTPATIENT
Start: 2021-12-21 | End: 2021-12-22

## 2021-12-21 RX ADMIN — Medication 100 GRAM(S): at 17:30

## 2021-12-21 RX ADMIN — VASOPRESSIN 2.4 UNIT(S)/MIN: 20 INJECTION INTRAVENOUS at 17:48

## 2021-12-21 RX ADMIN — FAMOTIDINE 20 MILLIGRAM(S): 10 INJECTION INTRAVENOUS at 18:16

## 2021-12-21 RX ADMIN — DEXMEDETOMIDINE HYDROCHLORIDE IN 0.9% SODIUM CHLORIDE 19.5 MICROGRAM(S)/KG/HR: 4 INJECTION INTRAVENOUS at 18:56

## 2021-12-21 RX ADMIN — CHLORHEXIDINE GLUCONATE 15 MILLILITER(S): 213 SOLUTION TOPICAL at 05:49

## 2021-12-21 RX ADMIN — NICARDIPINE HYDROCHLORIDE 25 MG/HR: 30 CAPSULE, EXTENDED RELEASE ORAL at 17:49

## 2021-12-21 RX ADMIN — AMLODIPINE BESYLATE 5 MILLIGRAM(S): 2.5 TABLET ORAL at 05:49

## 2021-12-21 RX ADMIN — Medication 9 MICROGRAM(S)/MIN: at 17:50

## 2021-12-21 RX ADMIN — DEXMEDETOMIDINE HYDROCHLORIDE IN 0.9% SODIUM CHLORIDE 19.5 MICROGRAM(S)/KG/HR: 4 INJECTION INTRAVENOUS at 17:47

## 2021-12-21 RX ADMIN — Medication 100 MILLIGRAM(S): at 17:45

## 2021-12-21 RX ADMIN — Medication 50 MILLIEQUIVALENT(S): at 16:23

## 2021-12-21 RX ADMIN — Medication 12.5 MILLIGRAM(S): at 05:47

## 2021-12-21 RX ADMIN — Medication 400 MILLIGRAM(S): at 20:45

## 2021-12-21 RX ADMIN — Medication 100 MILLIGRAM(S): at 22:31

## 2021-12-21 RX ADMIN — PANTOPRAZOLE SODIUM 40 MILLIGRAM(S): 20 TABLET, DELAYED RELEASE ORAL at 05:49

## 2021-12-21 RX ADMIN — AMIODARONE HYDROCHLORIDE 400 MILLIGRAM(S): 400 TABLET ORAL at 05:49

## 2021-12-21 RX ADMIN — CLOPIDOGREL BISULFATE 75 MILLIGRAM(S): 75 TABLET, FILM COATED ORAL at 22:54

## 2021-12-21 RX ADMIN — Medication 7.31 MICROGRAM(S)/KG/MIN: at 17:48

## 2021-12-21 RX ADMIN — Medication 1000 MILLIGRAM(S): at 21:00

## 2021-12-21 RX ADMIN — Medication 40 MILLIGRAM(S): at 05:49

## 2021-12-21 RX ADMIN — ONDANSETRON 4 MILLIGRAM(S): 8 TABLET, FILM COATED ORAL at 21:30

## 2021-12-21 RX ADMIN — INSULIN HUMAN 10 UNIT(S)/HR: 100 INJECTION, SOLUTION SUBCUTANEOUS at 17:47

## 2021-12-21 NOTE — DISCHARGE NOTE PROVIDER - NSDCFUADDAPPT_GEN_ALL_CORE_FT
Please call Dr. Case's office at [phone] on Monday morning at 9:30 am on 12/27/21 to schedule appointment time.

## 2021-12-21 NOTE — DISCHARGE NOTE PROVIDER - CARE PROVIDERS DIRECT ADDRESSES
[email or web address]

## 2021-12-21 NOTE — BRIEF OPERATIVE NOTE - NSICDXBRIEFPOSTOP_GEN_ALL_CORE_FT
POST-OP DIAGNOSIS:  Double vessel coronary artery disease 21-Dec-2021 16:25:49  Jacob Case  Implantable cardioverter-defibrillator (ICD) discharge 21-Dec-2021 16:24:35  Jacob Case

## 2021-12-21 NOTE — BRIEF OPERATIVE NOTE - COMMENTS
I, Dr. Birmingham, was present as the first assistant for the major portion of the case including the entire duration while constructing the distal and proximal coronary anastomosis.

## 2021-12-21 NOTE — BRIEF OPERATIVE NOTE - NSICDXBRIEFPROCEDURE_GEN_ALL_CORE_FT
PROCEDURES:  CABG, 1 arterial and 2 venous 21-Dec-2021 16:20:42  Jacob Case  Replacement, transvenous electrode leads and pulse generator, pacing ICD 21-Dec-2021 16:22:08  Jacob Case

## 2021-12-21 NOTE — DISCHARGE NOTE PROVIDER - HOSPITAL COURSE
70 yo male, h/p CAD s.p 5 stents , MI 2006 s.p AICD, HTN, DL, COPD not on home oxygen or bipap presented for AICD firing.  Patient felt 3 episodes of AICD firing causing chest discomfort . Last time he followed with EP, 8 months ago, he has been told that one of the leads might not be working.  Upon ICD interrogation V tach 5x s.p shock was detected.    Subsequent cardiac catheterization on 12/16/2021 revealed severe 2vCAD. On 12/21/21, he underwent surgical myocardial revascularization and AICD replacement. Post-operatively,    70 yo male, h/p CAD s.p 5 stents , MI 2006 s.p AICD, HTN, DL, COPD not on home oxygen or bipap presented for AICD firing.  Patient felt 3 episodes of AICD firing causing chest discomfort . Last time he followed with EP, 8 months ago, he has been told that one of the leads might not be working.  Upon ICD interrogation V tach 5x s.p shock was detected.    Subsequent cardiac catheterization on 12/16/2021 revealed severe 2vCAD. On 12/21/21, he underwent surgical myocardial revascularization and AICD replacement. Post-operatively, patient's hospital course was uncomplicated. Patient's 70 yo male, h/p CAD s.p 5 stents , MI 2006 s.p AICD, HTN, DL, COPD not on home oxygen or bipap presented for AICD firing.  Patient felt 3 episodes of AICD firing causing chest discomfort . Last time he followed with EP, 8 months ago, he has been told that one of the leads might not be working.  Upon ICD interrogation V tach 5x s.p shock was detected.  Subsequent cardiac catheterization on 12/16/2021 revealed severe 2vCAD. On 12/21/21, he underwent surgical myocardial revascularization and AICD replacement. Post-operatively, patient's hospital course was uncomplicated. Patient's hospital course was uncomplicated and patient was discharged home on POD#4. patient had not had a BM post-op and will continue stool softeners and suppositories.

## 2021-12-21 NOTE — PROGRESS NOTE ADULT - SUBJECTIVE AND OBJECTIVE BOX
ZUNILDA DENNIS 71y Male  MRN#: [id number]   CODE STATUS:________    Hospital Day: 6d    Pt is currently admitted with the primary diagnosis of     SUBJECTIVE  Hospital Course    Overnight events     Subjective complaints     Present Today:   - Oshea:  No [  ], Yes [   ] : Indication:     - Type of IV Access:       .. CVC/Piccline:  No [  ], Yes [   ] : Indication:       .. Midline: No [  ], Yes [   ] : Indication:                                              OBJECTIVE  PAST MEDICAL & SURGICAL HISTORY  COPD (chronic obstructive pulmonary disease)    CAD (coronary artery disease)    Myocardial infarction  2006    CAD (coronary artery disease)  HAD CARDIAC CATH WITH STENTS 2006    Hypercholesteremia    HTN (hypertension)    CAD (coronary artery disease)  CARDIAC CATH 2006  AICD 2015    History of coronary artery stent placement                                                ALLERGIES:  No Known Allergies                           HOME MEDICATIONS  Home Medications:  amLODIPine 5 mg oral tablet: 1 tab(s) orally once a day (10 Feb 2020 17:27)  aspirin 81 mg oral tablet, chewable: 1 tab(s) orally once a day (10 Feb 2020 17:27)  atorvastatin 40 mg oral tablet: 1 tab(s) orally once a day (10 Feb 2020 17:27)  benazepril 5 mg oral tablet: 1 tab(s) orally once a day (10 Feb 2020 17:27)  Metoprolol Succinate ER 50 mg oral tablet, extended release: 1 tab(s) orally once a day (10 Feb 2020 17:27)  Nitrostat 0.3 mg sublingual tablet: 1 tab(s) sublingual every 5 minutes, As Needed (10 Feb 2020 17:27)  Plavix 75 mg oral tablet: 1 tab(s) orally once a day (10 Feb 2020 17:27)  Ventolin HFA 90 mcg/inh inhalation aerosol: 2 puff(s) inhaled 4 times a day, As Needed (10 Feb 2020 17:27)                           MEDICATIONS:  STANDING MEDICATIONS  albumin human  5% IVPB 3000 milliLiter(s) IV Intermittent once  aMIOdarone    Tablet 400 milliGRAM(s) Oral two times a day  amLODIPine   Tablet 5 milliGRAM(s) Oral daily  aspirin enteric coated 81 milliGRAM(s) Oral daily  atorvastatin 40 milliGRAM(s) Oral at bedtime  budesonide 160 MICROgram(s)/formoterol 4.5 MICROgram(s) Inhaler 2 Puff(s) Inhalation two times a day  chlorhexidine 4% Liquid 1 Application(s) Topical daily  furosemide    Tablet 40 milliGRAM(s) Oral daily  pantoprazole    Tablet 40 milliGRAM(s) Oral before breakfast  sodium chloride 0.9%. 1000 milliLiter(s) IV Continuous <Continuous>    PRN MEDICATIONS  albuterol/ipratropium for Nebulization 3 milliLiter(s) Nebulizer every 6 hours PRN                                            ------------------------------------------------------------  VITAL SIGNS: Last 24 Hours  T(C): 36 (21 Dec 2021 05:11), Max: 36.6 (20 Dec 2021 20:22)  T(F): 96.8 (21 Dec 2021 05:11), Max: 97.8 (20 Dec 2021 20:22)  HR: 62 (21 Dec 2021 05:11) (62 - 72)  BP: 120/70 (21 Dec 2021 05:11) (103/73 - 120/70)  BP(mean): --  RR: 18 (20 Dec 2021 20:22) (18 - 18)  SpO2: 97% (20 Dec 2021 20:22) (97% - 97%)      12-19-21 @ 07:01  -  12-20-21 @ 07:00  --------------------------------------------------------  IN: 460 mL / OUT: 0 mL / NET: 460 mL    12-20-21 @ 07:01  -  12-21-21 @ 06:55  --------------------------------------------------------  IN: 380 mL / OUT: 0 mL / NET: 380 mL                                               LABS:                        15.6   8.31  )-----------( 244      ( 20 Dec 2021 11:00 )             49.6     12-20    142  |  102  |  32<H>  ----------------------------<  105<H>  4.6   |  21  |  1.3    Ca    10.2<H>      20 Dec 2021 11:00  Mg     1.9     12-20    TPro  8.0  /  Alb  4.9  /  TBili  0.5  /  DBili  x   /  AST  21  /  ALT  16  /  AlkPhos  85  12-20    PT/INR - ( 20 Dec 2021 11:00 )   PT: 12.20 sec;   INR: 1.06 ratio         PTT - ( 20 Dec 2021 11:00 )  PTT:34.3 sec                                                          RADIOLOGY:        PHYSICAL EXAM:  GENERAL: NAD, lying in bed comfortably  HEAD:  Atraumatic, Normocephalic  EYES: EOMI, PERRLA, conjunctiva and sclera clear  ENT: Moist mucous membranes  NECK: Supple, No JVD  CHEST/LUNG: Clear to auscultation bilaterally; No rales, rhonchi, wheezing, or rubs. Unlabored respirations  HEART: Regular rate and rhythm; No murmurs, rubs, or gallops  ABDOMEN: BSx4; Soft, nontender, nondistended  EXTREMITIES:  2+ Peripheral Pulses, brisk capillary refill. No clubbing, cyanosis, or edema  NERVOUS SYSTEM:  A&Ox3, no focal deficits   SKIN: No rashes or lesions                                         ASSESSMENT & PLAN    Past medical history and hospital course                                                                                                           ----------------------------------------------------  # DVT prophylaxis     # GI prophylaxis     # Diet     # Activity Score (AM-PAC)    # Code status     # Disposition                                                                              --------------------------------------------------------    # Handoff      ZUNILDA DENNIS 71y Male  MRN#: [id number]   CODE STATUS:________    Hospital Day: 6d    Pt is currently admitted with the primary diagnosis of AICD firing with Vtach runs    SUBJECTIVE  Hospital Course  72 yo male, h/p CAD s.p 5 stents , MI 2006 s.p AICD, HTN, DL, COPD not on home oxygen or bipap presented for AICD firing  Patient felt 3 episodes of AICD firing causing chest discomfort today staring 6 PM; No similar episode in the past  Last time he followed with EP, 8 months ago, he has been told that one of the leads might not be working  Upon ICD interrogation by cardiac fellow V tach 5x s.p shock was detected   ROS negative for syncope, dizziness, headache, fever , chills, N/V  or respiratory symptoms     in ED 97 F 104 bpm 132/77 mmHg 18  %    Overnight events   No overnight events   No telemetry events except for NSVT    Subjective complaints   patient is feeling fine this morning, mildly anxious and reports mild palpitations but otherwise he has no complaints.   He denies chest pain, SOB, dizziness, lightheadedness and does not report any episodes of AICD shock     Present Today:   - Oshea:  No [ X ], Yes [   ] : Indication:     OBJECTIVE  PAST MEDICAL & SURGICAL HISTORY  COPD (chronic obstructive pulmonary disease)    CAD (coronary artery disease)    Myocardial infarction  2006    CAD (coronary artery disease)  HAD CARDIAC CATH WITH STENTS 2006    Hypercholesteremia    HTN (hypertension)    CAD (coronary artery disease)  CARDIAC CATH 2006  AICD 2015    History of coronary artery stent placement                                                ALLERGIES:  No Known Allergies                           HOME MEDICATIONS  Home Medications:  amLODIPine 5 mg oral tablet: 1 tab(s) orally once a day (10 Feb 2020 17:27)  aspirin 81 mg oral tablet, chewable: 1 tab(s) orally once a day (10 Feb 2020 17:27)  atorvastatin 40 mg oral tablet: 1 tab(s) orally once a day (10 Feb 2020 17:27)  benazepril 5 mg oral tablet: 1 tab(s) orally once a day (10 Feb 2020 17:27)  Metoprolol Succinate ER 50 mg oral tablet, extended release: 1 tab(s) orally once a day (10 Feb 2020 17:27)  Nitrostat 0.3 mg sublingual tablet: 1 tab(s) sublingual every 5 minutes, As Needed (10 Feb 2020 17:27)  Plavix 75 mg oral tablet: 1 tab(s) orally once a day (10 Feb 2020 17:27)  Ventolin HFA 90 mcg/inh inhalation aerosol: 2 puff(s) inhaled 4 times a day, As Needed (10 Feb 2020 17:27)                           MEDICATIONS:  STANDING MEDICATIONS  albumin human  5% IVPB 3000 milliLiter(s) IV Intermittent once  aMIOdarone    Tablet 400 milliGRAM(s) Oral two times a day  amLODIPine   Tablet 5 milliGRAM(s) Oral daily  aspirin enteric coated 81 milliGRAM(s) Oral daily  atorvastatin 40 milliGRAM(s) Oral at bedtime  budesonide 160 MICROgram(s)/formoterol 4.5 MICROgram(s) Inhaler 2 Puff(s) Inhalation two times a day  chlorhexidine 4% Liquid 1 Application(s) Topical daily  furosemide    Tablet 40 milliGRAM(s) Oral daily  pantoprazole    Tablet 40 milliGRAM(s) Oral before breakfast  sodium chloride 0.9%. 1000 milliLiter(s) IV Continuous <Continuous>    PRN MEDICATIONS  albuterol/ipratropium for Nebulization 3 milliLiter(s) Nebulizer every 6 hours PRN                                            ------------------------------------------------------------  VITAL SIGNS: Last 24 Hours  T(C): 36 (21 Dec 2021 05:11), Max: 36.6 (20 Dec 2021 20:22)  T(F): 96.8 (21 Dec 2021 05:11), Max: 97.8 (20 Dec 2021 20:22)  HR: 62 (21 Dec 2021 05:11) (62 - 72)  BP: 120/70 (21 Dec 2021 05:11) (103/73 - 120/70)  BP(mean): --  RR: 18 (20 Dec 2021 20:22) (18 - 18)  SpO2: 97% (20 Dec 2021 20:22) (97% - 97%)      12-19-21 @ 07:01  -  12-20-21 @ 07:00  --------------------------------------------------------  IN: 460 mL / OUT: 0 mL / NET: 460 mL    12-20-21 @ 07:01  -  12-21-21 @ 06:55  --------------------------------------------------------  IN: 380 mL / OUT: 0 mL / NET: 380 mL                                               LABS:                        15.6   8.31  )-----------( 244      ( 20 Dec 2021 11:00 )             49.6     12-20    142  |  102  |  32<H>  ----------------------------<  105<H>  4.6   |  21  |  1.3    Ca    10.2<H>      20 Dec 2021 11:00  Mg     1.9     12-20    TPro  8.0  /  Alb  4.9  /  TBili  0.5  /  DBili  x   /  AST  21  /  ALT  16  /  AlkPhos  85  12-20    PT/INR - ( 20 Dec 2021 11:00 )   PT: 12.20 sec;   INR: 1.06 ratio         PTT - ( 20 Dec 2021 11:00 )  PTT:34.3 sec                                              RADIOLOGY:    None    PHYSICAL EXAM:  GENERAL: NAD, lying in bed comfortably  NECK: Supple, No JVD  CHEST/LUNG: Clear to auscultation bilaterally; No rales, rhonchi, wheezing, or rubs. Unlabored respirations  HEART: Regular rate and rhythm; No murmurs, rubs, or gallops  ABDOMEN: BSx4; Soft, nontender, nondistended  EXTREMITIES:  2+ Peripheral Pulses, brisk capillary refill. No clubbing, cyanosis, or edema  NERVOUS SYSTEM:  A&Ox3, no focal deficits   SKIN: No rashes or lesions                                         ASSESSMENT & PLAN    72 yo male, h/p CAD s.p 5 stents , MI 2006 s.p AICD, HTN, DL, COPD not on home oxygen or bipap presented for AICD firing    # V tach with AICD firing  - EP consult--->may need lead replacement, echo shows EF 30-35%; f/u EP outpatient  - ICD interrogated by cardiac fellow: 5 episodes of V Tach 5 episodes of firing  - possible lead defect  - cardiac catheterization on 12/16/2021 revealed severe 2vCAD. CABG and ICD lead and generator replacement next Tuesday or Wednesday  - keep K > 4 and Mg > 2  -continue toprol, lisinopril  - CABG today     # CAD s/p 5 stents/HTN/DLD  -continue home meds; home benazepril switched to lisinopril  -HDL 34 on lipid panel; TSH normal  -cath showed 2 vessel disease, pt scheduled for CABG with CT surgery next week    # COPD not currently in exacerbation, current smoker  -continue duonebs, symbicort  - no wheezes on auscultation  - not on home oxygen or Bipap                                                                              ----------------------------------------------------  # DVT prophylaxis - Lovenox     # GI prophylaxis - pantoprazole    # Diet - DASH     # Activity - IAT   No PT eval -> consulted    # Code status -Full code    # Disposition - Acute                                                                             --------------------------------------------------------    # Handoff

## 2021-12-21 NOTE — DISCHARGE NOTE PROVIDER - NSDCCPCAREPLAN_GEN_ALL_CORE_FT
PRINCIPAL DISCHARGE DIAGNOSIS  Diagnosis: AICD discharge  Assessment and Plan of Treatment:       SECONDARY DISCHARGE DIAGNOSES  Diagnosis: Ventricular tachycardia  Assessment and Plan of Treatment:

## 2021-12-21 NOTE — DISCHARGE NOTE PROVIDER - CARE PROVIDER_API CALL
Jacob Case)  Surgery; Thoracic and Cardiac Surgery  [address], [address]  [place] [address]  Phone: [phone]  Fax: [phone]  Follow Up Time:     Shant Mayo)  Cardiovascular Disease; Internal Medicine; Interventional Cardiology; Nuclear Cardiology  [address], [address]  [place] [address]  Phone: [phone]  Fax: [phone]  Follow Up Time:     Fer Aparicio  FAMILY MEDICINE  [address] [place] [address]  Phone: [phone]  Fax: [phone]  Follow Up Time:    Jacob Case)  Surgery; Thoracic and Cardiac Surgery  [address], [address]  [place] [address]  Phone: [phone]  Fax: [phone]  Scheduled Appointment: 12/29/2021    Shant Mayo)  Cardiovascular Disease; Internal Medicine; Interventional Cardiology; Nuclear Cardiology  [address], [address]  [place] [address]  Phone: [phone]  Fax: [phone]  Follow Up Time:     Fer Aparicio  FAMILY MEDICINE  [address] [place] [address]  Phone: [phone]  Fax: [phone]  Follow Up Time:

## 2021-12-21 NOTE — DISCHARGE NOTE PROVIDER - NSDCMRMEDTOKEN_GEN_ALL_CORE_FT
amLODIPine 5 mg oral tablet: 1 tab(s) orally once a day  aspirin 81 mg oral tablet, chewable: 1 tab(s) orally once a day  atorvastatin 40 mg oral tablet: 1 tab(s) orally once a day  benazepril 5 mg oral tablet: 1 tab(s) orally once a day  K-Tab 10 mEq oral tablet, extended release: 1 tab(s) orally once a day   Lasix 40 mg oral tablet: 1 tab(s) orally once a day   Metoprolol Succinate ER 50 mg oral tablet, extended release: 1 tab(s) orally once a day  Nitrostat 0.3 mg sublingual tablet: 1 tab(s) sublingual every 5 minutes, As Needed  Plavix 75 mg oral tablet: 1 tab(s) orally once a day  Ventolin HFA 90 mcg/inh inhalation aerosol: 2 puff(s) inhaled 4 times a day, As Needed   aspirin 325 mg oral delayed release tablet: 1 tab(s) orally once a day  atorvastatin 40 mg oral tablet: 1 tab(s) orally once a day  famotidine 20 mg oral tablet: 1 tab(s) orally 2 times a day  K-Tab 10 mEq oral tablet, extended release: 1 tab(s) orally once a day   Lasix 40 mg oral tablet: 1 tab(s) orally once a day   Metoprolol Tartrate 25 mg oral tablet: 0.5 tab(s) orally 2 times a day   Plavix 75 mg oral tablet: 1 tab(s) orally once a day  polyethylene glycol 3350 oral powder for reconstitution: 17 gram(s) orally once a day  senna oral tablet: 2 tab(s) orally once a day  Ventolin HFA 90 mcg/inh inhalation aerosol: 2 puff(s) inhaled 4 times a day, As Needed

## 2021-12-21 NOTE — BRIEF OPERATIVE NOTE - OPERATION/FINDINGS
SEVERE LAD DISTAL DISEASE; LIMA UNSUITABLE FOR DISTAL LAD OR DIATAL CIRMCUMFLEX, REQUIRED COMPOSITE GRAFT TO THE OBTUSE MARGINAL

## 2021-12-21 NOTE — BRIEF OPERATIVE NOTE - NSICDXBRIEFPREOP_GEN_ALL_CORE_FT
PRE-OP DIAGNOSIS:  Implanted defibrillator electrode lead fracture 21-Dec-2021 16:24:16  Jacob Case  Implantable cardioverter-defibrillator (ICD) discharge 21-Dec-2021 16:24:03  Jacob Case  Double vessel coronary artery disease 21-Dec-2021 16:25:41  Jacob Case

## 2021-12-21 NOTE — DISCHARGE NOTE PROVIDER - PROVIDER TOKENS
PROVIDER:[TOKEN:[[id number]]],PROVIDER:[TOKEN:[[id number]]],PROVIDER:[TOKEN:[[id number]]] PROVIDER:[TOKEN:[[id number]],SCHEDULEDAPPT:[12/29/2021]],PROVIDER:[TOKEN:[[id number]]],PROVIDER:[TOKEN:[[id number]]]

## 2021-12-22 LAB
ALBUMIN SERPL ELPH-MCNC: 4.4 G/DL — SIGNIFICANT CHANGE UP (ref 3.5–5.2)
ALP SERPL-CCNC: 31 U/L — SIGNIFICANT CHANGE UP (ref 30–115)
ALT FLD-CCNC: 11 U/L — SIGNIFICANT CHANGE UP (ref 0–41)
ANION GAP SERPL CALC-SCNC: 14 MMOL/L — SIGNIFICANT CHANGE UP (ref 7–14)
APTT BLD: 27.5 SEC — SIGNIFICANT CHANGE UP (ref 27–39.2)
AST SERPL-CCNC: 30 U/L — SIGNIFICANT CHANGE UP (ref 0–41)
BASOPHILS # BLD AUTO: 0.01 K/UL — SIGNIFICANT CHANGE UP (ref 0–0.2)
BASOPHILS NFR BLD AUTO: 0.1 % — SIGNIFICANT CHANGE UP (ref 0–1)
BILIRUB SERPL-MCNC: 0.5 MG/DL — SIGNIFICANT CHANGE UP (ref 0.2–1.2)
BUN SERPL-MCNC: 35 MG/DL — HIGH (ref 10–20)
CALCIUM SERPL-MCNC: 9 MG/DL — SIGNIFICANT CHANGE UP (ref 8.5–10.1)
CHLORIDE SERPL-SCNC: 110 MMOL/L — SIGNIFICANT CHANGE UP (ref 98–110)
CO2 SERPL-SCNC: 20 MMOL/L — SIGNIFICANT CHANGE UP (ref 17–32)
CREAT SERPL-MCNC: 1.2 MG/DL — SIGNIFICANT CHANGE UP (ref 0.7–1.5)
EOSINOPHIL # BLD AUTO: 0 K/UL — SIGNIFICANT CHANGE UP (ref 0–0.7)
EOSINOPHIL NFR BLD AUTO: 0 % — SIGNIFICANT CHANGE UP (ref 0–8)
GLUCOSE BLDC GLUCOMTR-MCNC: 102 MG/DL — HIGH (ref 70–99)
GLUCOSE BLDC GLUCOMTR-MCNC: 102 MG/DL — HIGH (ref 70–99)
GLUCOSE BLDC GLUCOMTR-MCNC: 104 MG/DL — HIGH (ref 70–99)
GLUCOSE BLDC GLUCOMTR-MCNC: 110 MG/DL — HIGH (ref 70–99)
GLUCOSE BLDC GLUCOMTR-MCNC: 111 MG/DL — HIGH (ref 70–99)
GLUCOSE BLDC GLUCOMTR-MCNC: 117 MG/DL — HIGH (ref 70–99)
GLUCOSE BLDC GLUCOMTR-MCNC: 122 MG/DL — HIGH (ref 70–99)
GLUCOSE BLDC GLUCOMTR-MCNC: 128 MG/DL — HIGH (ref 70–99)
GLUCOSE SERPL-MCNC: 104 MG/DL — HIGH (ref 70–99)
HCT VFR BLD CALC: 26.9 % — LOW (ref 42–52)
HGB BLD-MCNC: 8.5 G/DL — LOW (ref 14–18)
IMM GRANULOCYTES NFR BLD AUTO: 0.5 % — HIGH (ref 0.1–0.3)
INR BLD: 1.37 RATIO — HIGH (ref 0.65–1.3)
LYMPHOCYTES # BLD AUTO: 0.63 K/UL — LOW (ref 1.2–3.4)
LYMPHOCYTES # BLD AUTO: 6.4 % — LOW (ref 20.5–51.1)
MAGNESIUM SERPL-MCNC: 1.9 MG/DL — SIGNIFICANT CHANGE UP (ref 1.8–2.4)
MCHC RBC-ENTMCNC: 26.5 PG — LOW (ref 27–31)
MCHC RBC-ENTMCNC: 31.6 G/DL — LOW (ref 32–37)
MCV RBC AUTO: 83.8 FL — SIGNIFICANT CHANGE UP (ref 80–94)
MONOCYTES # BLD AUTO: 0.86 K/UL — HIGH (ref 0.1–0.6)
MONOCYTES NFR BLD AUTO: 8.7 % — SIGNIFICANT CHANGE UP (ref 1.7–9.3)
NEUTROPHILS # BLD AUTO: 8.29 K/UL — HIGH (ref 1.4–6.5)
NEUTROPHILS NFR BLD AUTO: 84.3 % — HIGH (ref 42.2–75.2)
NRBC # BLD: 0 /100 WBCS — SIGNIFICANT CHANGE UP (ref 0–0)
PLATELET # BLD AUTO: 124 K/UL — LOW (ref 130–400)
POTASSIUM SERPL-MCNC: 4.4 MMOL/L — SIGNIFICANT CHANGE UP (ref 3.5–5)
POTASSIUM SERPL-SCNC: 4.4 MMOL/L — SIGNIFICANT CHANGE UP (ref 3.5–5)
PROT SERPL-MCNC: 5.7 G/DL — LOW (ref 6–8)
PROTHROM AB SERPL-ACNC: 15.7 SEC — HIGH (ref 9.95–12.87)
RBC # BLD: 3.21 M/UL — LOW (ref 4.7–6.1)
RBC # FLD: 16 % — HIGH (ref 11.5–14.5)
SODIUM SERPL-SCNC: 144 MMOL/L — SIGNIFICANT CHANGE UP (ref 135–146)
WBC # BLD: 9.84 K/UL — SIGNIFICANT CHANGE UP (ref 4.8–10.8)
WBC # FLD AUTO: 9.84 K/UL — SIGNIFICANT CHANGE UP (ref 4.8–10.8)

## 2021-12-22 PROCEDURE — 71045 X-RAY EXAM CHEST 1 VIEW: CPT | Mod: 26,77

## 2021-12-22 PROCEDURE — 71045 X-RAY EXAM CHEST 1 VIEW: CPT | Mod: 26

## 2021-12-22 PROCEDURE — 99232 SBSQ HOSP IP/OBS MODERATE 35: CPT

## 2021-12-22 PROCEDURE — 93010 ELECTROCARDIOGRAM REPORT: CPT

## 2021-12-22 PROCEDURE — 99233 SBSQ HOSP IP/OBS HIGH 50: CPT | Mod: 25

## 2021-12-22 PROCEDURE — 93283 PRGRMG EVAL IMPLANTABLE DFB: CPT | Mod: 26

## 2021-12-22 RX ORDER — GLUCAGON INJECTION, SOLUTION 0.5 MG/.1ML
1 INJECTION, SOLUTION SUBCUTANEOUS ONCE
Refills: 0 | Status: DISCONTINUED | OUTPATIENT
Start: 2021-12-22 | End: 2021-12-25

## 2021-12-22 RX ORDER — ONDANSETRON 8 MG/1
4 TABLET, FILM COATED ORAL ONCE
Refills: 0 | Status: COMPLETED | OUTPATIENT
Start: 2021-12-22 | End: 2021-12-22

## 2021-12-22 RX ORDER — ATORVASTATIN CALCIUM 80 MG/1
40 TABLET, FILM COATED ORAL AT BEDTIME
Refills: 0 | Status: DISCONTINUED | OUTPATIENT
Start: 2021-12-22 | End: 2021-12-25

## 2021-12-22 RX ORDER — DEXTROSE 50 % IN WATER 50 %
15 SYRINGE (ML) INTRAVENOUS ONCE
Refills: 0 | Status: DISCONTINUED | OUTPATIENT
Start: 2021-12-22 | End: 2021-12-24

## 2021-12-22 RX ORDER — FAMOTIDINE 10 MG/ML
20 INJECTION INTRAVENOUS
Refills: 0 | Status: DISCONTINUED | OUTPATIENT
Start: 2021-12-22 | End: 2021-12-25

## 2021-12-22 RX ORDER — IPRATROPIUM/ALBUTEROL SULFATE 18-103MCG
3 AEROSOL WITH ADAPTER (GRAM) INHALATION EVERY 6 HOURS
Refills: 0 | Status: DISCONTINUED | OUTPATIENT
Start: 2021-12-22 | End: 2021-12-25

## 2021-12-22 RX ORDER — SODIUM CHLORIDE 9 MG/ML
1000 INJECTION, SOLUTION INTRAVENOUS
Refills: 0 | Status: DISCONTINUED | OUTPATIENT
Start: 2021-12-22 | End: 2021-12-24

## 2021-12-22 RX ORDER — ACETAMINOPHEN 500 MG
1000 TABLET ORAL ONCE
Refills: 0 | Status: COMPLETED | OUTPATIENT
Start: 2021-12-22 | End: 2021-12-22

## 2021-12-22 RX ORDER — DEXTROSE 50 % IN WATER 50 %
25 SYRINGE (ML) INTRAVENOUS ONCE
Refills: 0 | Status: DISCONTINUED | OUTPATIENT
Start: 2021-12-22 | End: 2021-12-24

## 2021-12-22 RX ORDER — DEXTROSE 50 % IN WATER 50 %
12.5 SYRINGE (ML) INTRAVENOUS ONCE
Refills: 0 | Status: DISCONTINUED | OUTPATIENT
Start: 2021-12-22 | End: 2021-12-24

## 2021-12-22 RX ORDER — INSULIN LISPRO 100/ML
VIAL (ML) SUBCUTANEOUS
Refills: 0 | Status: DISCONTINUED | OUTPATIENT
Start: 2021-12-22 | End: 2021-12-24

## 2021-12-22 RX ADMIN — Medication 100 MILLIGRAM(S): at 05:12

## 2021-12-22 RX ADMIN — Medication 325 MILLIGRAM(S): at 11:54

## 2021-12-22 RX ADMIN — Medication 400 MILLIGRAM(S): at 05:15

## 2021-12-22 RX ADMIN — OXYCODONE HYDROCHLORIDE 5 MILLIGRAM(S): 5 TABLET ORAL at 22:09

## 2021-12-22 RX ADMIN — OXYCODONE HYDROCHLORIDE 5 MILLIGRAM(S): 5 TABLET ORAL at 00:45

## 2021-12-22 RX ADMIN — ONDANSETRON 4 MILLIGRAM(S): 8 TABLET, FILM COATED ORAL at 06:50

## 2021-12-22 RX ADMIN — OXYCODONE HYDROCHLORIDE 5 MILLIGRAM(S): 5 TABLET ORAL at 00:15

## 2021-12-22 RX ADMIN — Medication 1000 MILLIGRAM(S): at 05:30

## 2021-12-22 RX ADMIN — Medication 1000 MILLIGRAM(S): at 12:00

## 2021-12-22 RX ADMIN — Medication 400 MILLIGRAM(S): at 11:45

## 2021-12-22 RX ADMIN — ATORVASTATIN CALCIUM 40 MILLIGRAM(S): 80 TABLET, FILM COATED ORAL at 21:57

## 2021-12-22 RX ADMIN — OXYCODONE HYDROCHLORIDE 5 MILLIGRAM(S): 5 TABLET ORAL at 21:57

## 2021-12-22 RX ADMIN — OXYCODONE HYDROCHLORIDE 5 MILLIGRAM(S): 5 TABLET ORAL at 12:30

## 2021-12-22 RX ADMIN — FAMOTIDINE 20 MILLIGRAM(S): 10 INJECTION INTRAVENOUS at 05:12

## 2021-12-22 RX ADMIN — FAMOTIDINE 20 MILLIGRAM(S): 10 INJECTION INTRAVENOUS at 18:36

## 2021-12-22 RX ADMIN — POLYETHYLENE GLYCOL 3350 17 GRAM(S): 17 POWDER, FOR SOLUTION ORAL at 11:53

## 2021-12-22 RX ADMIN — CLOPIDOGREL BISULFATE 75 MILLIGRAM(S): 75 TABLET, FILM COATED ORAL at 11:53

## 2021-12-22 RX ADMIN — OXYCODONE HYDROCHLORIDE 5 MILLIGRAM(S): 5 TABLET ORAL at 12:00

## 2021-12-22 NOTE — PROGRESS NOTE ADULT - ATTENDING COMMENTS
VF due to ischemic heart disease s/p CABG  RV lead revision: addition of new RV lead due to malfunction prior RV lead  Device parameters stable  outpatient f/u with Dr. Padron

## 2021-12-22 NOTE — PROGRESS NOTE ADULT - SUBJECTIVE AND OBJECTIVE BOX
INTERVAL HPI/OVERNIGHT EVENTS: No acute events overnight    MEDICATIONS  (STANDING):  acetaminophen   IVPB .. 1000 milliGRAM(s) IV Intermittent once  albumin human  5% IVPB 3000 milliLiter(s) IV Intermittent once  albuterol/ipratropium for Nebulization 3 milliLiter(s) Nebulizer every 6 hours  aspirin enteric coated 325 milliGRAM(s) Oral daily  atorvastatin 40 milliGRAM(s) Oral at bedtime  clopidogrel Tablet 75 milliGRAM(s) Oral daily  dextrose 40% Gel 15 Gram(s) Oral once  dextrose 5%. 1000 milliLiter(s) (50 mL/Hr) IV Continuous <Continuous>  dextrose 5%. 1000 milliLiter(s) (100 mL/Hr) IV Continuous <Continuous>  dextrose 50% Injectable 25 Gram(s) IV Push once  dextrose 50% Injectable 12.5 Gram(s) IV Push once  dextrose 50% Injectable 25 Gram(s) IV Push once  famotidine    Tablet 20 milliGRAM(s) Oral two times a day  glucagon  Injectable 1 milliGRAM(s) IntraMuscular once  insulin lispro (ADMELOG) corrective regimen sliding scale   SubCutaneous three times a day before meals  milrinone Infusion 0.25 MICROgram(s)/kG/Min (5.85 mL/Hr) IV Continuous <Continuous>  polyethylene glycol 3350 17 Gram(s) Oral daily  sodium chloride 0.9%. 1000 milliLiter(s) (20 mL/Hr) IV Continuous <Continuous>    MEDICATIONS  (PRN):  oxyCODONE    IR 10 milliGRAM(s) Oral every 4 hours PRN Severe Pain (7 - 10)  oxyCODONE    IR 5 milliGRAM(s) Oral every 6 hours PRN Moderate Pain (4 - 6)      Allergies  No Known Allergies    Intolerances        REVIEW OF SYSTEMS: No CP, palpitations, dizziness, SOB    Vital Signs Last 24 Hrs  T(C): 36.3 (22 Dec 2021 07:15), Max: 36.8 (21 Dec 2021 20:00)  T(F): 97.3 (22 Dec 2021 07:15), Max: 98.2 (21 Dec 2021 20:00)  HR: 80 (22 Dec 2021 07:15) (79 - 81)  BP: 99/55 (22 Dec 2021 07:00) (81/54 - 103/63)  BP(mean): 70 (22 Dec 2021 07:00) (63 - 78)  RR: 23 (22 Dec 2021 07:15) (10 - 30)  SpO2: 99% (22 Dec 2021 07:15) (97% - 100%)    12-21-21 @ 07:01  -  12-22-21 @ 07:00  --------------------------------------------------------  IN: 1085.4 mL / OUT: 1712 mL / NET: -626.6 mL        Physical Exam  GENERAL: In no apparent distress, well nourished, and hydrated.  EYES: EOMI, PERRLA, conjunctiva and sclera clear  ENMT: No tonsillar erythema, exudates, or enlargements; ist mucous membranes, Good dentition, No lesions  NECK: Supple   HEART: Regular rate and rhythm; No murmurs, rubs, or gallops. +Chest tube in place  PULMONARY: Clear to auscultation and perfusion.  No rales, wheezing, or rhonchi bilaterally.  ABDOMEN: Soft, Nontender, Nondistended; Bowel sounds present  EXTREMITIES:  2+ Peripheral Pulses, No clubbing, cyanosis, or edema  SKIN: Dressing over L chest wall, no hematoma. Incisions healing well  NEUROLOGICAL: Grossly nonfocal    LABS:                        8.5    9.84  )-----------( 124      ( 22 Dec 2021 02:25 )             26.9     12-22    144  |  110  |  35<H>  ----------------------------<  104<H>  4.4   |  20  |  1.2    Ca    9.0      22 Dec 2021 02:25  Mg     1.9     12-22    TPro  5.7<L>  /  Alb  4.4  /  TBili  0.5  /  DBili  x   /  AST  30  /  ALT  11  /  AlkPhos  31  12-22    PT/INR - ( 22 Dec 2021 02:25 )   PT: 15.70 sec;   INR: 1.37 ratio         PTT - ( 22 Dec 2021 02:25 )  PTT:27.5 sec      RADIOLOGY & ADDITIONAL TESTS:

## 2021-12-22 NOTE — PROGRESS NOTE ADULT - ASSESSMENT
HPI:  70 yo male, h/p CAD s.p 5 stents , MI 2006 s.p AICD, HTN, DL, COPD not on home oxygen or bipap presented for AICD firing  s/p CABG x3 and ICD and wire palcemt     A/P  1- CAD atherosclerotic heart disease  asa Plavix  ( has hx of 5 stents       Low ef on milrinon will continue today 0.25  2- AICD replacmt monitor and f/u by cardiology  3- anemia of acute blood loss  4- thrombocytopenia f/u plt daily    remove sump chest tube and swan keep milrinon    HPI:  70 yo male, h/p CAD s.p 5 stents , MI 2006 s.p AICD, HTN, DL, COPD not on home oxygen or bipap presented for AICD firing  s/p CABG x3 and ICD and wire palcemt     A/P  1- CAD atherosclerotic heart disease  asa Plavix  ( has hx of 5 stents       Low ef on milrinon will continue today 0.25  2- AICD malfunction s/p replacmt monitor and f/u by cardiology  3- anemia of acute blood loss  4- thrombocytopenia f/u plt daily    remove sump chest tube and swan keep milrinon

## 2021-12-22 NOTE — PROGRESS NOTE ADULT - SUBJECTIVE AND OBJECTIVE BOX
OPERATIVE PROCEDURE(s):                POD #                       71yMale  SURGEON(s): DERIK Herr  SUBJECTIVE ASSESSMENT:  Patient has no complaints at this time.    Vital Signs Last 24 Hrs  T(F): 97.3 (22 Dec 2021 07:15), Max: 98.2 (21 Dec 2021 20:00)  HR: 80 (22 Dec 2021 07:15) (79 - 81)  BP: 99/55 (22 Dec 2021 07:00) (81/54 - 103/63)  BP(mean): 70 (22 Dec 2021 07:00) (63 - 78)  ABP: 115/54 (22 Dec 2021 07:15) (71/49 - 133/62)  ABP(mean): 73 (22 Dec 2021 07:15)  RR: 23 (22 Dec 2021 07:15) (10 - 30)  SpO2: 99% (22 Dec 2021 07:15) (97% - 100%)  CVP(mm Hg): 2 (22 Dec 2021 07:15)  CVP(cm H2O): --  CO: 5.4 (22 Dec 2021 07:00)  CI: 2.7 (22 Dec 2021 07:00)  PA: 25/9 (22 Dec 2021 07:15)  SVR: 1065 (22 Dec 2021 07:00)  Mode: standby  FiO2: 40    I&O's Detail    21 Dec 2021 07:01  -  22 Dec 2021 07:00  --------------------------------------------------------  IN:    Dexmedetomidine: 33.3 mL    Insulin: 30 mL    IV PiggyBack: 350 mL    Milrinone: 53.1 mL    Norepinephrine: 57.4 mL    Oral Fluid: 200 mL    sodium chloride 0.9%: 40 mL    sodium chloride 0.9%: 300 mL    Vasopressin: 21.6 mL  Total IN: 1085.4 mL    OUT:    Chest Tube (mL): 90 mL    Chest Tube (mL): 300 mL    Chest Tube (mL): 7 mL    Indwelling Catheter - Urethral (mL): 1315 mL    Nitroglycerin: 0 mL    Propofol: 0 mL  Total OUT: 1712 mL        Net:   I&O's Detail    20 Dec 2021 07:01  -  21 Dec 2021 07:00  --------------------------------------------------------  Total NET: 380 mL      21 Dec 2021 07:01  -  22 Dec 2021 07:00  --------------------------------------------------------  Total NET: -626.6 mL        CAPILLARY BLOOD GLUCOSE      POCT Blood Glucose.: 102 mg/dL (22 Dec 2021 08:08)  POCT Blood Glucose.: 122 mg/dL (22 Dec 2021 06:57)  POCT Blood Glucose.: 102 mg/dL (22 Dec 2021 04:12)  POCT Blood Glucose.: 111 mg/dL (22 Dec 2021 02:16)  POCT Blood Glucose.: 117 mg/dL (22 Dec 2021 00:08)  POCT Blood Glucose.: 94 mg/dL (21 Dec 2021 23:09)  POCT Blood Glucose.: 71 mg/dL (21 Dec 2021 22:10)  POCT Blood Glucose.: 93 mg/dL (21 Dec 2021 21:20)  POCT Blood Glucose.: 120 mg/dL (21 Dec 2021 20:12)  POCT Blood Glucose.: 153 mg/dL (21 Dec 2021 19:00)  POCT Blood Glucose.: 165 mg/dL (21 Dec 2021 18:01)  POCT Blood Glucose.: 157 mg/dL (21 Dec 2021 17:01)    Physical Exam:  General: NAD; A&Ox3/Patient is intubated and sedated  Cardiac: S1/S2, RRR, no murmur, no rubs  Lungs: unlabored respirations, CTA b/l, no wheeze, no rales, no crackles  Abdomen: Soft/NT/ND; positive bowel sounds x 4  Sternum: Intact, no click, incision healing well with no drainage  Incisions: Incisions clean/dry/intact  Extremities: No edema b/l lower extremities; good capillary refill; no cyanosis; palpable 1+ pedal pulses b/l    Central Venous Catheter: Yes[]  No[] , If Yes indication:           Day #  [place] Catheter: Yes  [] , No  [] , If yes indication:                      Day #  NGT: Yes [] No [] ,    If Yes Placement:                                     Day #  EPICARDIAL WIRES:  [] YES [] NO                                              Day #  BOWEL MOVEMENT:  [] YES [] NO, If No, Timing since last BM:      Day #  CHEST TUBE(Left/Right):  [] YES [] NO, If yes -  AIR LEAKS:  [] YES [] NO        LABS:                        8.5<L>  9.84  )-----------( 124<L>    ( 22 Dec 2021 02:25 )             26.9<L>                        9.0<L>  18.50<H> )-----------( 141      ( 21 Dec 2021 16:07 )             28.3<L>    12-22    144  |  110  |  35<H>  ----------------------------<  104<H>  4.4   |  20  |  1.2  12-21    143  |  109  |  35<H>  ----------------------------<  163<H>  4.6   |  18  |  1.3    Ca    9.0      22 Dec 2021 02:25  Mg     1.9     12-22    TPro  5.7<L> [6.0 - 8.0]  /  Alb  4.4 [3.5 - 5.2]  /  TBili  0.5 [0.2 - 1.2]  /  DBili  x   /  AST  30 [0 - 41]  /  ALT  11 [0 - 41]  /  AlkPhos  31 [30 - 115]  12-22    PT/INR - ( 22 Dec 2021 02:25 )   PT: ;   INR: 1.37 ratio         PT/INR - ( 21 Dec 2021 16:07 )   PT: ;   INR: 1.59 ratio         PTT - ( 22 Dec 2021 02:25 )  PTT:27.5 sec, PTT - ( 21 Dec 2021 16:07 )  PTT:29.0 sec    ABG - ( 22 Dec 2021 03:07 )  pH: 7.38  /  pCO2: 41    /  pO2: 99    / HCO3: 24    / Base Excess: -0.8  /  SaO2: 98.7  /  LA: 0.60             RADIOLOGY & ADDITIONAL TESTS:  CXR:   EKG:  MEDICATIONS  (STANDING):  acetaminophen   IVPB .. 1000 milliGRAM(s) IV Intermittent once  albumin human  5% IVPB 3000 milliLiter(s) IV Intermittent once  aspirin enteric coated 325 milliGRAM(s) Oral daily  clopidogrel Tablet 75 milliGRAM(s) Oral daily  milrinone Infusion 0.25 MICROgram(s)/kG/Min (5.85 mL/Hr) IV Continuous <Continuous>  polyethylene glycol 3350 17 Gram(s) Oral daily  sodium chloride 0.9%. 1000 milliLiter(s) (20 mL/Hr) IV Continuous <Continuous>    MEDICATIONS  (PRN):  oxyCODONE    IR 10 milliGRAM(s) Oral every 4 hours PRN Severe Pain (7 - 10)  oxyCODONE    IR 5 milliGRAM(s) Oral every 6 hours PRN Moderate Pain (4 - 6)    HEPARIN:  [] YES [] NO  Dose: XX UNITS/HR UNITS Q8H  LOVENOX:[] YES [] NO  Dose: XX mg Q24H  COUMADIN: []  YES [] NO  Dose: XX mg  Q24H  SCD's: YES b/l  GI Prophylaxis: Protonix [], Pepcid [], None [], (Contra-indication:.....)    Post-Op Aspirin: Yes [],  No [], If No, then contra-indication:  Post-Op Statin: Yes [], No[], If No, then contra-indication:  Post-Op Beta-Blockers: Yes [], No [], If No, then contra-indication:    Allergies:  No Known Allergies      Ambulation/Activity Status: Ambulates several times daily with assistance.    Assessment/Plan:  71y Male status-post .....  - Case and plan discussed with CTU Intensivist and CT Surgeon - Dr. Villegas/Manpreet/[place]  - Continue CTU supportive care    - Continue DVT/GI prophylaxis  - Incentive Spirometry 10 times an hour  - Continue to advance physical activity as tolerated and continue PT/OT as directed  1. CAD: Continue ASA, statin, BB  2. HTN:   3. A. Fib:   4. COPD/Hypoxia:   5. DM/Glucose Control:                              OPERATIVE PROCEDURE(s):   CABG             POD #  1                     71yMale  SURGEON(s): DERIK Herr  SUBJECTIVE ASSESSMENT:  Patient has no complaints at this time.    Vital Signs Last 24 Hrs  T(F): 97.3 (22 Dec 2021 07:15), Max: 98.2 (21 Dec 2021 20:00)  HR: 80 (22 Dec 2021 07:15) (79 - 81)  BP: 99/55 (22 Dec 2021 07:00) (81/54 - 103/63)  BP(mean): 70 (22 Dec 2021 07:00) (63 - 78)  ABP: 115/54 (22 Dec 2021 07:15) (71/49 - 133/62)  ABP(mean): 73 (22 Dec 2021 07:15)  RR: 23 (22 Dec 2021 07:15) (10 - 30)  SpO2: 99% (22 Dec 2021 07:15) (97% - 100%)  CVP(mm Hg): 2 (22 Dec 2021 07:15)  CO: 5.4 (22 Dec 2021 07:00)  CI: 2.7 (22 Dec 2021 07:00)  PA: 25/9 (22 Dec 2021 07:15)  SVR: 1065 (22 Dec 2021 07:00)  Mode: standby  FiO2: 40    I&O's Detail    21 Dec 2021 07:01  -  22 Dec 2021 07:00  --------------------------------------------------------  IN:    Dexmedetomidine: 33.3 mL    Insulin: 30 mL    IV PiggyBack: 350 mL    Milrinone: 53.1 mL    Norepinephrine: 57.4 mL    Oral Fluid: 200 mL    sodium chloride 0.9%: 40 mL    sodium chloride 0.9%: 300 mL    Vasopressin: 21.6 mL  Total IN: 1085.4 mL    OUT:    Chest Tube (mL): 90 mL    Chest Tube (mL): 300 mL    Chest Tube (mL): 7 mL    Indwelling Catheter - Urethral (mL): 1315 mL    Nitroglycerin: 0 mL    Propofol: 0 mL  Total OUT: 1712 mL        Net:   I&O's Detail    20 Dec 2021 07:01  -  21 Dec 2021 07:00  --------------------------------------------------------  Total NET: 380 mL      21 Dec 2021 07:01  -  22 Dec 2021 07:00  --------------------------------------------------------  Total NET: -626.6 mL        CAPILLARY BLOOD GLUCOSE  POCT Blood Glucose.: 102 mg/dL (22 Dec 2021 08:08)  POCT Blood Glucose.: 122 mg/dL (22 Dec 2021 06:57)  POCT Blood Glucose.: 102 mg/dL (22 Dec 2021 04:12)  POCT Blood Glucose.: 111 mg/dL (22 Dec 2021 02:16)  POCT Blood Glucose.: 117 mg/dL (22 Dec 2021 00:08)  POCT Blood Glucose.: 94 mg/dL (21 Dec 2021 23:09)  POCT Blood Glucose.: 71 mg/dL (21 Dec 2021 22:10)  POCT Blood Glucose.: 93 mg/dL (21 Dec 2021 21:20)  POCT Blood Glucose.: 120 mg/dL (21 Dec 2021 20:12)  POCT Blood Glucose.: 153 mg/dL (21 Dec 2021 19:00)  POCT Blood Glucose.: 165 mg/dL (21 Dec 2021 18:01)  POCT Blood Glucose.: 157 mg/dL (21 Dec 2021 17:01)    Physical Exam:  General: NAD; A&Ox3  Cardiac: S1/S2, RRR, no murmur, no rubs  Lungs: unlabored respirations, CTA b/l, no wheeze, no rales, no crackles  Abdomen: Soft/NT/ND; positive bowel sounds x 4  Sternum: Intact, no click, incision healing well with no drainage  Incisions: Incisions clean/dry/intact  Extremities: No edema b/l lower extremities; good capillary refill; no cyanosis; palpable 1+ pedal pulses b/l        LABS:                        8.5<L>  9.84  )-----------( 124<L>    ( 22 Dec 2021 02:25 )             26.9<L>                        9.0<L>  18.50<H> )-----------( 141      ( 21 Dec 2021 16:07 )             28.3<L>    12-22    144  |  110  |  35<H>  ----------------------------<  104<H>  4.4   |  20  |  1.2  12-21    143  |  109  |  35<H>  ----------------------------<  163<H>  4.6   |  18  |  1.3    Ca    9.0      22 Dec 2021 02:25  Mg     1.9     12-22    TPro  5.7<L> [6.0 - 8.0]  /  Alb  4.4 [3.5 - 5.2]  /  TBili  0.5 [0.2 - 1.2]  /  DBili  x   /  AST  30 [0 - 41]  /  ALT  11 [0 - 41]  /  AlkPhos  31 [30 - 115]  12-22    PT/INR - ( 22 Dec 2021 02:25 )   PT: ;   INR: 1.37 ratio         PT/INR - ( 21 Dec 2021 16:07 )   PT: ;   INR: 1.59 ratio         PTT - ( 22 Dec 2021 02:25 )  PTT:27.5 sec, PTT - ( 21 Dec 2021 16:07 )  PTT:29.0 sec    ABG - ( 22 Dec 2021 03:07 )  pH: 7.38  /  pCO2: 41    /  pO2: 99    / HCO3: 24    / Base Excess: -0.8  /  SaO2: 98.7  /  LA: 0.60       MEDICATIONS  (STANDING):  acetaminophen   IVPB .. 1000 milliGRAM(s) IV Intermittent once  albumin human  5% IVPB 3000 milliLiter(s) IV Intermittent once  aspirin enteric coated 325 milliGRAM(s) Oral daily  clopidogrel Tablet 75 milliGRAM(s) Oral daily  milrinone Infusion 0.25 MICROgram(s)/kG/Min (5.85 mL/Hr) IV Continuous <Continuous>  polyethylene glycol 3350 17 Gram(s) Oral daily  sodium chloride 0.9%. 1000 milliLiter(s) (20 mL/Hr) IV Continuous <Continuous>    MEDICATIONS  (PRN):  oxyCODONE    IR 10 milliGRAM(s) Oral every 4 hours PRN Severe Pain (7 - 10)  oxyCODONE    IR 5 milliGRAM(s) Oral every 6 hours PRN Moderate Pain (4 - 6)      Allergies:  No Known Allergies      Ambulation/Activity Status: Ambulates several times daily with assistance.    Assessment/Plan:  71y Male status-post CABG  - Case and plan discussed with CTU Intensivist and CT Surgeon - Dr. Villegas/Manpreet/[place]  - Continue CTU supportive care    - Continue DVT/GI prophylaxis  - Incentive Spirometry 10 times an hour  - Continue to advance physical activity as tolerated and continue PT/OT as directed  1- CAD atherosclerotic heart disease  asa Plavix  ( has hx of 5 stents       Low ef on milrinon will continue today 0.25  2- AICD malfunction s/p replacmt monitor and f/u by cardiology  3- anemia of acute blood loss  4- thrombocytopenia f/u plt daily    remove sump chest tube and swan keep milrinon

## 2021-12-22 NOTE — PROGRESS NOTE ADULT - SUBJECTIVE AND OBJECTIVE BOX
`CTU Attending Progress Daily Note     22 Dec 2021 08:25  POD# -   He has history of COPD (chronic obstructive pulmonary disease)    CAD (coronary artery disease)    Myocardial infarction    CAD (coronary artery disease)    Hypercholesteremia    HTN (hypertension)      Interval event for past 24 hr:  ZUNILDA DENNIS  71y had no event.   Current Complains:  ZUNILDA DENNIS has no new complains  HPI:  70 yo male, h/p CAD s.p 5 stents , MI 2006 s.p AICD, HTN, DL, COPD not on home oxygen or bipap presented for AICD firing  Patient felt 3 episodes of AICD firing causing chest discomfort today staring 6 PM; No similar episode in the past  Last time he followed with EP, 8 months ago, he has been told that one of the leads might not be working  Upon ICD interrogation by cardiac fellow V tach 5x s.p shock was detected   ROS negative for syncope, dizziness, headache, fever , chills, N/V  or respiratory symptoms     in ED 97 F 104 bpm 132/77 mmHg 18  % (15 Dec 2021 22:55)    OBJECTIVE:  ICU Vital Signs Last 24 Hrs  T(C): 36.3 (22 Dec 2021 07:15), Max: 36.8 (21 Dec 2021 20:00)  T(F): 97.3 (22 Dec 2021 07:15), Max: 98.2 (21 Dec 2021 20:00)  HR: 80 (22 Dec 2021 07:15) (79 - 81)  BP: 99/55 (22 Dec 2021 07:00) (81/54 - 103/63)  BP(mean): 70 (22 Dec 2021 07:00) (63 - 78)  ABP: 115/54 (22 Dec 2021 07:15) (71/49 - 133/62)  ABP(mean): 73 (22 Dec 2021 07:15) (55 - 89)  RR: 23 (22 Dec 2021 07:15) (10 - 30)  SpO2: 99% (22 Dec 2021 07:15) (97% - 100%)    I&O's Summary    21 Dec 2021 07:01  -  22 Dec 2021 07:00  --------------------------------------------------------  IN: 1085.4 mL / OUT: 1712 mL / NET: -626.6 mL      I&O's Detail    21 Dec 2021 07:01  -  22 Dec 2021 07:00  --------------------------------------------------------  IN:    Dexmedetomidine: 33.3 mL    Insulin: 30 mL    IV PiggyBack: 350 mL    Milrinone: 53.1 mL    Norepinephrine: 57.4 mL    Oral Fluid: 200 mL    sodium chloride 0.9%: 40 mL    sodium chloride 0.9%: 300 mL    Vasopressin: 21.6 mL  Total IN: 1085.4 mL    OUT:    Chest Tube (mL): 90 mL    Chest Tube (mL): 300 mL    Chest Tube (mL): 7 mL    Indwelling Catheter - Urethral (mL): 1315 mL    Nitroglycerin: 0 mL    Propofol: 0 mL  Total OUT: 1712 mL    Total NET: -626.6 mL        Adult Advanced Hemodynamics Last 24 Hrs  CVP(mm Hg): 2 (22 Dec 2021 07:15) (2 - 277)  CVP(cm H2O): --  CO: 5.4 (22 Dec 2021 07:00) (5 - 6.8)  CI: 2.7 (22 Dec 2021 07:00) (2.5 - 3.4)  PA: 25/9 (22 Dec 2021 07:15) (21/13 - 42/35)  PA(mean): 15 (22 Dec 2021 07:15) (14 - 37)  PCWP: --  SVR: 1065 (22 Dec 2021 07:00) (651 - 1102)  SVRI: 2130 (22 Dec 2021 07:00) (1302 - 2205)  PVR: --  PVRI: --  Mode: standby  FiO2: 40    CAPILLARY BLOOD GLUCOSE      POCT Blood Glucose.: 102 mg/dL (22 Dec 2021 08:08)  POCT Blood Glucose.: 122 mg/dL (22 Dec 2021 06:57)  POCT Blood Glucose.: 102 mg/dL (22 Dec 2021 04:12)  POCT Blood Glucose.: 111 mg/dL (22 Dec 2021 02:16)  POCT Blood Glucose.: 117 mg/dL (22 Dec 2021 00:08)  POCT Blood Glucose.: 94 mg/dL (21 Dec 2021 23:09)  POCT Blood Glucose.: 71 mg/dL (21 Dec 2021 22:10)  POCT Blood Glucose.: 93 mg/dL (21 Dec 2021 21:20)  POCT Blood Glucose.: 120 mg/dL (21 Dec 2021 20:12)  POCT Blood Glucose.: 153 mg/dL (21 Dec 2021 19:00)  POCT Blood Glucose.: 165 mg/dL (21 Dec 2021 18:01)  POCT Blood Glucose.: 157 mg/dL (21 Dec 2021 17:01)    LABS:  ABG - ( 22 Dec 2021 03:07 )  pH, Arterial: 7.38  pH, Blood: x     /  pCO2: 41    /  pO2: 99    / HCO3: 24    / Base Excess: -0.8  /  SaO2: 98.7                                    8.5    9.84  )-----------( 124      ( 22 Dec 2021 02:25 )             26.9     12-22    144  |  110  |  35<H>  ----------------------------<  104<H>  4.4   |  20  |  1.2    Ca    9.0      22 Dec 2021 02:25  Mg     1.9     12-22    TPro  5.7<L>  /  Alb  4.4  /  TBili  0.5  /  DBili  x   /  AST  30  /  ALT  11  /  AlkPhos  31  12-22    PT/INR - ( 22 Dec 2021 02:25 )   PT: 15.70 sec;   INR: 1.37 ratio         PTT - ( 22 Dec 2021 02:25 )  PTT:27.5 sec      Home Medications:  amLODIPine 5 mg oral tablet: 1 tab(s) orally once a day (10 Feb 2020 17:27)  aspirin 81 mg oral tablet, chewable: 1 tab(s) orally once a day (10 Feb 2020 17:27)  atorvastatin 40 mg oral tablet: 1 tab(s) orally once a day (10 Feb 2020 17:27)  benazepril 5 mg oral tablet: 1 tab(s) orally once a day (10 Feb 2020 17:27)  Metoprolol Succinate ER 50 mg oral tablet, extended release: 1 tab(s) orally once a day (10 Feb 2020 17:27)  Nitrostat 0.3 mg sublingual tablet: 1 tab(s) sublingual every 5 minutes, As Needed (10 Feb 2020 17:27)  Plavix 75 mg oral tablet: 1 tab(s) orally once a day (10 Feb 2020 17:27)  Ventolin HFA 90 mcg/inh inhalation aerosol: 2 puff(s) inhaled 4 times a day, As Needed (10 Feb 2020 17:27)    HOSPITAL MEDICATIONS:  MEDICATIONS  (STANDING):  acetaminophen   IVPB .. 1000 milliGRAM(s) IV Intermittent once  albumin human  5% IVPB 3000 milliLiter(s) IV Intermittent once  ALBUTerol    90 MICROgram(s) HFA Inhaler 2 Puff(s) Inhalation every 6 hours  aspirin enteric coated 325 milliGRAM(s) Oral daily  clopidogrel Tablet 75 milliGRAM(s) Oral daily  dexMEDEtomidine Infusion 1 MICROgram(s)/kG/Hr (19.5 mL/Hr) IV Continuous <Continuous>  dextrose 50% Injectable 50 milliLiter(s) IV Push every 15 minutes  dextrose 50% Injectable 25 milliLiter(s) IV Push every 15 minutes  famotidine Injectable 20 milliGRAM(s) IV Push every 12 hours  insulin regular Infusion 10 Unit(s)/Hr (10 mL/Hr) IV Continuous <Continuous>  ipratropium 17 MICROgram(s) HFA Inhaler 2 Puff(s) Inhalation every 6 hours  meperidine     Injectable 25 milliGRAM(s) IV Push once  milrinone Infusion 0.25 MICROgram(s)/kG/Min (5.85 mL/Hr) IV Continuous <Continuous>  niCARdipine Infusion 5 mG/Hr (25 mL/Hr) IV Continuous <Continuous>  nitroglycerin  Infusion 30 MICROgram(s)/Min (9 mL/Hr) IV Continuous <Continuous>  norepinephrine Infusion 0.05 MICROgram(s)/kG/Min (7.31 mL/Hr) IV Continuous <Continuous>  polyethylene glycol 3350 17 Gram(s) Oral daily  propofol Infusion 30 MICROgram(s)/kG/Min (14 mL/Hr) IV Continuous <Continuous>  sodium chloride 0.9%. 1000 milliLiter(s) (20 mL/Hr) IV Continuous <Continuous>  vasopressin Infusion 0.04 Unit(s)/Min (2.4 mL/Hr) IV Continuous <Continuous>    MEDICATIONS  (PRN):  oxyCODONE    IR 10 milliGRAM(s) Oral every 4 hours PRN Severe Pain (7 - 10)  oxyCODONE    IR 5 milliGRAM(s) Oral every 6 hours PRN Moderate Pain (4 - 6)      REVIEW OF SYSTEMS:  CONSTITUTIONAL: [X] all negative; [ ] weakness, [ ] fevers, [ ] chills  EYES/ENT: [X] all negative; [ ] visual changes, [ ] vertigo, [ ] throat pain   NECK: [X] all negative; [ ] pain, [ ] stiffness  RESPIRATORY: [] all negative, [ ] cough, [ ] wheezing, [ ] hemoptysis, [ ] shortness of breath  CARDIOVASCULAR: [] all negative; [ ] chest pain, [ ] palpitations, [ ] orthopnea  GASTROINTESTINAL: [X] all negative; [ ]abdominal pain, [ ] nausea, [ ] vomiting, [ ] hematemesis, [ ] diarrhea, [ ] constipation, [ ] melena, [ ] hematochezia.  GENITOURINARY: [X] all negative; [ ] dysuria, [ ] frequency, [ ] hematuria  NEUROLOGICAL: [X] all negative; [ ] numbness, [ ] weakness  SKIN: [X] all negative; [ ] itching, [ ] burning, [ ] rashes, [ ] lesions   All other review of systems is negative unless indicated above.    [  ] Unable to assess ROS because     PHYSICAL EXAM:          CONSTITUTIONAL: Well-developed; well-nourished; in no acute distress.   	SKIN: warm, dry  	HEAD: Normocephalic; atraumatic.  	EYES: PERRL, EOM, no conj injection, sclera clear  	ENT: No nasal discharge; airway clear.  	NECK: Supple; non tender.  No midline ttp ctls  	CARD: S1, S2 normal; no murmurs, gallops, or rubs. Regular rate and rhythm. 2+ RPs and DPs bilat, no carotid bruits, no pedal   edema, no calf pain b/l  	RESP: CTA  bilat good air movement No wheezes, rales or rhonchi.  	ABD: Soft, not tender, not distended, no CVA ttp no rebound or guarding, bowel sounds present  	EXT: Normal ROM.  No clubbing, cyanosis or edema.   	  	NEURO: Alert, awake, motor 5/5 R, 5/5 L        RADIOLOGY:  xray  < from: Xray Chest 1 View-PORTABLE IMMEDIATE (Xray Chest 1 View-PORTABLE IMMEDIATE .) (12.21.21 @ 17:04) >    Impression:    Low lung volume.    Status post a median sternotomy.    Improved bilateral opacities.    Support tubes and lines as above.    < end of copied text >    I spent 45 minutes of critical care time ; more than 50% of visit was spent counseling and/or examining patient, reviewing vitals, labs, medications, imaging and discussing with the team goals of care to prevent life-threatening in this patient who is at high risk for deterioration or death due to:   `CTU Attending Progress Daily Note     22 Dec 2021 08:25  POD# - 1  CABG C3L  AICD     He has history of COPD (chronic obstructive pulmonary disease)    CAD (coronary artery disease)    Myocardial infarction    CAD (coronary artery disease)    Hypercholesteremia    HTN (hypertension)      Interval event for past 24 hr:  ZUNILDA DENNIS  71y had no event.   Current Complains:  ZUNILDA DENNIS has no new complains  HPI:  70 yo male, h/p CAD s.p 5 stents , MI 2006 s.p AICD, HTN, DL, COPD not on home oxygen or bipap presented for AICD firing  Patient felt 3 episodes of AICD firing causing chest discomfort today staring 6 PM; No similar episode in the past  Last time he followed with EP, 8 months ago, he has been told that one of the leads might not be working  Upon ICD interrogation by cardiac fellow V tach 5x s.p shock was detected   ROS negative for syncope, dizziness, headache, fever , chills, N/V  or respiratory symptoms     in ED 97 F 104 bpm 132/77 mmHg 18  % (15 Dec 2021 22:55)    OBJECTIVE:  ICU Vital Signs Last 24 Hrs  T(C): 36.3 (22 Dec 2021 07:15), Max: 36.8 (21 Dec 2021 20:00)  T(F): 97.3 (22 Dec 2021 07:15), Max: 98.2 (21 Dec 2021 20:00)  HR: 80 (22 Dec 2021 07:15) (79 - 81)  BP: 99/55 (22 Dec 2021 07:00) (81/54 - 103/63)  BP(mean): 70 (22 Dec 2021 07:00) (63 - 78)  ABP: 115/54 (22 Dec 2021 07:15) (71/49 - 133/62)  ABP(mean): 73 (22 Dec 2021 07:15) (55 - 89)  RR: 23 (22 Dec 2021 07:15) (10 - 30)  SpO2: 99% (22 Dec 2021 07:15) (97% - 100%)    I&O's Summary    21 Dec 2021 07:01  -  22 Dec 2021 07:00  --------------------------------------------------------  IN: 1085.4 mL / OUT: 1712 mL / NET: -626.6 mL      I&O's Detail    21 Dec 2021 07:01  -  22 Dec 2021 07:00  --------------------------------------------------------  IN:    Dexmedetomidine: 33.3 mL    Insulin: 30 mL    IV PiggyBack: 350 mL    Milrinone: 53.1 mL    Norepinephrine: 57.4 mL    Oral Fluid: 200 mL    sodium chloride 0.9%: 40 mL    sodium chloride 0.9%: 300 mL    Vasopressin: 21.6 mL  Total IN: 1085.4 mL    OUT:    Chest Tube (mL): 90 mL    Chest Tube (mL): 300 mL    Chest Tube (mL): 7 mL    Indwelling Catheter - Urethral (mL): 1315 mL    Nitroglycerin: 0 mL    Propofol: 0 mL  Total OUT: 1712 mL    Total NET: -626.6 mL        Adult Advanced Hemodynamics Last 24 Hrs  CVP(mm Hg): 2 (22 Dec 2021 07:15) (2 - 277)  CVP(cm H2O): --  CO: 5.4 (22 Dec 2021 07:00) (5 - 6.8)  CI: 2.7 (22 Dec 2021 07:00) (2.5 - 3.4)  PA: 25/9 (22 Dec 2021 07:15) (21/13 - 42/35)  PA(mean): 15 (22 Dec 2021 07:15) (14 - 37)  PCWP: --  SVR: 1065 (22 Dec 2021 07:00) (651 - 1102)  SVRI: 2130 (22 Dec 2021 07:00) (1302 - 2205)  PVR: --  PVRI: --  Mode: standby  FiO2: 40    CAPILLARY BLOOD GLUCOSE      POCT Blood Glucose.: 102 mg/dL (22 Dec 2021 08:08)  POCT Blood Glucose.: 122 mg/dL (22 Dec 2021 06:57)  POCT Blood Glucose.: 102 mg/dL (22 Dec 2021 04:12)  POCT Blood Glucose.: 111 mg/dL (22 Dec 2021 02:16)  POCT Blood Glucose.: 117 mg/dL (22 Dec 2021 00:08)  POCT Blood Glucose.: 94 mg/dL (21 Dec 2021 23:09)  POCT Blood Glucose.: 71 mg/dL (21 Dec 2021 22:10)  POCT Blood Glucose.: 93 mg/dL (21 Dec 2021 21:20)  POCT Blood Glucose.: 120 mg/dL (21 Dec 2021 20:12)  POCT Blood Glucose.: 153 mg/dL (21 Dec 2021 19:00)  POCT Blood Glucose.: 165 mg/dL (21 Dec 2021 18:01)  POCT Blood Glucose.: 157 mg/dL (21 Dec 2021 17:01)    LABS:  ABG - ( 22 Dec 2021 03:07 )  pH, Arterial: 7.38  pH, Blood: x     /  pCO2: 41    /  pO2: 99    / HCO3: 24    / Base Excess: -0.8  /  SaO2: 98.7                                    8.5    9.84  )-----------( 124      ( 22 Dec 2021 02:25 )             26.9     12-22    144  |  110  |  35<H>  ----------------------------<  104<H>  4.4   |  20  |  1.2    Ca    9.0      22 Dec 2021 02:25  Mg     1.9     12-22    TPro  5.7<L>  /  Alb  4.4  /  TBili  0.5  /  DBili  x   /  AST  30  /  ALT  11  /  AlkPhos  31  12-22    PT/INR - ( 22 Dec 2021 02:25 )   PT: 15.70 sec;   INR: 1.37 ratio         PTT - ( 22 Dec 2021 02:25 )  PTT:27.5 sec      Home Medications:  amLODIPine 5 mg oral tablet: 1 tab(s) orally once a day (10 Feb 2020 17:27)  aspirin 81 mg oral tablet, chewable: 1 tab(s) orally once a day (10 Feb 2020 17:27)  atorvastatin 40 mg oral tablet: 1 tab(s) orally once a day (10 Feb 2020 17:27)  benazepril 5 mg oral tablet: 1 tab(s) orally once a day (10 Feb 2020 17:27)  Metoprolol Succinate ER 50 mg oral tablet, extended release: 1 tab(s) orally once a day (10 Feb 2020 17:27)  Nitrostat 0.3 mg sublingual tablet: 1 tab(s) sublingual every 5 minutes, As Needed (10 Feb 2020 17:27)  Plavix 75 mg oral tablet: 1 tab(s) orally once a day (10 Feb 2020 17:27)  Ventolin HFA 90 mcg/inh inhalation aerosol: 2 puff(s) inhaled 4 times a day, As Needed (10 Feb 2020 17:27)    HOSPITAL MEDICATIONS:  MEDICATIONS  (STANDING):  acetaminophen   IVPB .. 1000 milliGRAM(s) IV Intermittent once  albumin human  5% IVPB 3000 milliLiter(s) IV Intermittent once  ALBUTerol    90 MICROgram(s) HFA Inhaler 2 Puff(s) Inhalation every 6 hours  aspirin enteric coated 325 milliGRAM(s) Oral daily  clopidogrel Tablet 75 milliGRAM(s) Oral daily  dexMEDEtomidine Infusion 1 MICROgram(s)/kG/Hr (19.5 mL/Hr) IV Continuous <Continuous>  dextrose 50% Injectable 50 milliLiter(s) IV Push every 15 minutes  dextrose 50% Injectable 25 milliLiter(s) IV Push every 15 minutes  famotidine Injectable 20 milliGRAM(s) IV Push every 12 hours  insulin regular Infusion 10 Unit(s)/Hr (10 mL/Hr) IV Continuous <Continuous>  ipratropium 17 MICROgram(s) HFA Inhaler 2 Puff(s) Inhalation every 6 hours  meperidine     Injectable 25 milliGRAM(s) IV Push once  milrinone Infusion 0.25 MICROgram(s)/kG/Min (5.85 mL/Hr) IV Continuous <Continuous>  niCARdipine Infusion 5 mG/Hr (25 mL/Hr) IV Continuous <Continuous>  nitroglycerin  Infusion 30 MICROgram(s)/Min (9 mL/Hr) IV Continuous <Continuous>  norepinephrine Infusion 0.05 MICROgram(s)/kG/Min (7.31 mL/Hr) IV Continuous <Continuous>  polyethylene glycol 3350 17 Gram(s) Oral daily  propofol Infusion 30 MICROgram(s)/kG/Min (14 mL/Hr) IV Continuous <Continuous>  sodium chloride 0.9%. 1000 milliLiter(s) (20 mL/Hr) IV Continuous <Continuous>  vasopressin Infusion 0.04 Unit(s)/Min (2.4 mL/Hr) IV Continuous <Continuous>    MEDICATIONS  (PRN):  oxyCODONE    IR 10 milliGRAM(s) Oral every 4 hours PRN Severe Pain (7 - 10)  oxyCODONE    IR 5 milliGRAM(s) Oral every 6 hours PRN Moderate Pain (4 - 6)      REVIEW OF SYSTEMS:  CONSTITUTIONAL: [X] all negative; [ ] weakness, [ ] fevers, [ ] chills  EYES/ENT: [X] all negative; [ ] visual changes, [ ] vertigo, [ ] throat pain   NECK: [X] all negative; [ ] pain, [ ] stiffness  RESPIRATORY: [] all negative, [ ] cough, [ ] wheezing, [ ] hemoptysis, [ ] shortness of breath  CARDIOVASCULAR: [] all negative; [ ] chest pain, [ ] palpitations, [ ] orthopnea  GASTROINTESTINAL: [X] all negative; [ ]abdominal pain, [ ] nausea, [ ] vomiting, [ ] hematemesis, [ ] diarrhea, [ ] constipation, [ ] melena, [ ] hematochezia.  GENITOURINARY: [X] all negative; [ ] dysuria, [ ] frequency, [ ] hematuria  NEUROLOGICAL: [X] all negative; [ ] numbness, [ ] weakness  SKIN: [X] all negative; [ ] itching, [ ] burning, [ ] rashes, [ ] lesions   All other review of systems is negative unless indicated above.    [  ] Unable to assess ROS because     PHYSICAL EXAM:          CONSTITUTIONAL: Well-developed; well-nourished; in no acute distress.   	SKIN: warm, dry  	HEAD: Normocephalic; atraumatic.  	EYES: PERRL, EOM, no conj injection, sclera clear  	ENT: No nasal discharge; airway clear.  	NECK: Supple; non tender.  No midline ttp ctls  	CARD: S1, S2 normal; no murmurs, gallops, or rubs. Regular rate and rhythm. 2+ RPs and DPs bilat, no carotid bruits, no pedal   edema, no calf pain b/l  	RESP: CTA  bilat good air movement No wheezes, rales or rhonchi.  	ABD: Soft, not tender, not distended, no CVA ttp no rebound or guarding, bowel sounds present  	EXT: Normal ROM.  No clubbing, cyanosis or edema.   	  	NEURO: Alert, awake, motor 5/5 R, 5/5 L        RADIOLOGY:  xray  < from: Xray Chest 1 View-PORTABLE IMMEDIATE (Xray Chest 1 View-PORTABLE IMMEDIATE .) (12.21.21 @ 17:04) >    Impression:    Low lung volume.    Status post a median sternotomy.    Improved bilateral opacities.    Support tubes and lines as above.    < end of copied text >    I spent 45 minutes of critical care time ; more than 50% of visit was spent counseling and/or examining patient, reviewing vitals, labs, medications, imaging and discussing with the team goals of care to prevent life-threatening in this patient who is at high risk for deterioration or death due to:

## 2021-12-22 NOTE — PROGRESS NOTE ADULT - ASSESSMENT
Cards: Dr Woody  EP Dr Padron    Assessment: 72 yo male, h/p CAD s.p 5 stents , MI 2006 s.p AICD, HTN, DL, COPD not on home oxygen or bipap presented for AICD firing. Patient felt 3 episodes of AICD firing causing chest discomfort; No similar episode in the past. Last time he followed with EP, 8 months ago, he has been told that one of the leads might not be working. Upon ICD interrogation by cardiac fellow V tach 5x s.p shock was detected. Subsequent cardiac catheterization on 12/16/2021 revealed severe 2vCAD.CABG and ICD lead and generator replacement next Tuesday or Wednesday. Patient now s/p CABG and RV lead extraction and reimplant, POD#1    Impression:  S/P RV Lead Extraction and Reimplant  Severe 2V Disease sp CABG  AICD shocks  CAD s/p PCI  HTN  DLD    Plan:  - CXR completed  - Interrogation completed, numbers within appropriate range  - Continue current medications as per CT sx  - Wound care follow up as per CT sx  - Do not lift left arm above shoulder height x 6 weeks  - Follow up in one month with Dr Padron  - No further EP intervention needed at this time Cards: Dr Woody  EP Dr Padron    Assessment: 70 yo male, h/p CAD s.p 5 stents , MI 2006 s.p AICD, HTN, DL, COPD not on home oxygen or bipap presented for AICD firing. Patient felt 3 episodes of AICD firing causing chest discomfort; No similar episode in the past. Last time he followed with EP, 8 months ago, he has been told that one of the leads might not be working. Upon ICD interrogation by cardiac fellow V tach 5x s.p shock was detected. Subsequent cardiac catheterization on 12/16/2021 revealed severe 2vCAD.CABG and ICD lead and generator replacement next Tuesday or Wednesday. Patient now s/p CABG and RV lead addition, POD#1    Impression:  S/P RV revision: addition of new RV Lead   ICD not MRI conditional due to presence of abandoned leads  Severe 2V Disease sp CABG  AICD shocks  CAD s/p PCI  HTN  DLD    Plan:  - CXR completed  - Interrogation completed, numbers within appropriate range  - Continue current medications as per CT sx  - Wound care follow up as per CT sx  - Do not lift left arm above shoulder height x 6 weeks  - Follow up in one month with Dr Padron  - No further EP intervention needed at this time

## 2021-12-22 NOTE — PROGRESS NOTE ADULT - TIME BILLING
CTS Ateending  case discussed wit Dr. Zavala today  CxR, CT Scans Cath dqata and Labs reviewed  Pt informed of the need for CABG  Pt also informed of the need for ICD lead replacement, since it is incapable of pacing due to fracture.  Pt is a good responder to Plavix and has a POC P2Y12 VerifyNow test of 85.  I anticipate pt will undergo CABG and ICD lead and generator replacement next Tuesday or Wednesday  depending of the level of P2Y12 activity.  I also informed the patient's referring cardiologist, Dr. Elizalde.    -FMR
ICM, VF
chart review, resident/nursing teaching rounds, and interdisciplinary planning.
review and discussion

## 2021-12-23 LAB
ALBUMIN SERPL ELPH-MCNC: 3.6 G/DL — SIGNIFICANT CHANGE UP (ref 3.5–5.2)
ALP SERPL-CCNC: 37 U/L — SIGNIFICANT CHANGE UP (ref 30–115)
ALT FLD-CCNC: 11 U/L — SIGNIFICANT CHANGE UP (ref 0–41)
ANION GAP SERPL CALC-SCNC: 10 MMOL/L — SIGNIFICANT CHANGE UP (ref 7–14)
AST SERPL-CCNC: 38 U/L — SIGNIFICANT CHANGE UP (ref 0–41)
BASOPHILS # BLD AUTO: 0.02 K/UL — SIGNIFICANT CHANGE UP (ref 0–0.2)
BASOPHILS NFR BLD AUTO: 0.2 % — SIGNIFICANT CHANGE UP (ref 0–1)
BILIRUB SERPL-MCNC: 0.6 MG/DL — SIGNIFICANT CHANGE UP (ref 0.2–1.2)
BUN SERPL-MCNC: 23 MG/DL — HIGH (ref 10–20)
CALCIUM SERPL-MCNC: 8.4 MG/DL — LOW (ref 8.5–10.1)
CHLORIDE SERPL-SCNC: 107 MMOL/L — SIGNIFICANT CHANGE UP (ref 98–110)
CO2 SERPL-SCNC: 21 MMOL/L — SIGNIFICANT CHANGE UP (ref 17–32)
CREAT SERPL-MCNC: 1 MG/DL — SIGNIFICANT CHANGE UP (ref 0.7–1.5)
EOSINOPHIL # BLD AUTO: 0.02 K/UL — SIGNIFICANT CHANGE UP (ref 0–0.7)
EOSINOPHIL NFR BLD AUTO: 0.2 % — SIGNIFICANT CHANGE UP (ref 0–8)
GLUCOSE BLDC GLUCOMTR-MCNC: 103 MG/DL — HIGH (ref 70–99)
GLUCOSE BLDC GLUCOMTR-MCNC: 127 MG/DL — HIGH (ref 70–99)
GLUCOSE BLDC GLUCOMTR-MCNC: 127 MG/DL — HIGH (ref 70–99)
GLUCOSE BLDC GLUCOMTR-MCNC: 130 MG/DL — HIGH (ref 70–99)
GLUCOSE SERPL-MCNC: 117 MG/DL — HIGH (ref 70–99)
HCT VFR BLD CALC: 29.3 % — LOW (ref 42–52)
HGB BLD-MCNC: 9.2 G/DL — LOW (ref 14–18)
IMM GRANULOCYTES NFR BLD AUTO: 0.4 % — HIGH (ref 0.1–0.3)
LYMPHOCYTES # BLD AUTO: 0.66 K/UL — LOW (ref 1.2–3.4)
LYMPHOCYTES # BLD AUTO: 6.9 % — LOW (ref 20.5–51.1)
MAGNESIUM SERPL-MCNC: 2 MG/DL — SIGNIFICANT CHANGE UP (ref 1.8–2.4)
MCHC RBC-ENTMCNC: 26.4 PG — LOW (ref 27–31)
MCHC RBC-ENTMCNC: 31.4 G/DL — LOW (ref 32–37)
MCV RBC AUTO: 84 FL — SIGNIFICANT CHANGE UP (ref 80–94)
MONOCYTES # BLD AUTO: 1.15 K/UL — HIGH (ref 0.1–0.6)
MONOCYTES NFR BLD AUTO: 12.1 % — HIGH (ref 1.7–9.3)
NEUTROPHILS # BLD AUTO: 7.65 K/UL — HIGH (ref 1.4–6.5)
NEUTROPHILS NFR BLD AUTO: 80.2 % — HIGH (ref 42.2–75.2)
NRBC # BLD: 0 /100 WBCS — SIGNIFICANT CHANGE UP (ref 0–0)
PLATELET # BLD AUTO: 111 K/UL — LOW (ref 130–400)
POTASSIUM SERPL-MCNC: 4.2 MMOL/L — SIGNIFICANT CHANGE UP (ref 3.5–5)
POTASSIUM SERPL-SCNC: 4.2 MMOL/L — SIGNIFICANT CHANGE UP (ref 3.5–5)
PROT SERPL-MCNC: 5.3 G/DL — LOW (ref 6–8)
RBC # BLD: 3.49 M/UL — LOW (ref 4.7–6.1)
RBC # FLD: 16.1 % — HIGH (ref 11.5–14.5)
SODIUM SERPL-SCNC: 138 MMOL/L — SIGNIFICANT CHANGE UP (ref 135–146)
WBC # BLD: 9.54 K/UL — SIGNIFICANT CHANGE UP (ref 4.8–10.8)
WBC # FLD AUTO: 9.54 K/UL — SIGNIFICANT CHANGE UP (ref 4.8–10.8)

## 2021-12-23 PROCEDURE — 99232 SBSQ HOSP IP/OBS MODERATE 35: CPT

## 2021-12-23 PROCEDURE — 93010 ELECTROCARDIOGRAM REPORT: CPT

## 2021-12-23 PROCEDURE — 71045 X-RAY EXAM CHEST 1 VIEW: CPT | Mod: 26

## 2021-12-23 RX ORDER — METOPROLOL TARTRATE 50 MG
12.5 TABLET ORAL EVERY 12 HOURS
Refills: 0 | Status: DISCONTINUED | OUTPATIENT
Start: 2021-12-23 | End: 2021-12-25

## 2021-12-23 RX ORDER — OXYCODONE HYDROCHLORIDE 5 MG/1
5 TABLET ORAL EVERY 4 HOURS
Refills: 0 | Status: DISCONTINUED | OUTPATIENT
Start: 2021-12-23 | End: 2021-12-25

## 2021-12-23 RX ORDER — ENOXAPARIN SODIUM 100 MG/ML
40 INJECTION SUBCUTANEOUS DAILY
Refills: 0 | Status: DISCONTINUED | OUTPATIENT
Start: 2021-12-23 | End: 2021-12-25

## 2021-12-23 RX ORDER — CHLORHEXIDINE GLUCONATE 213 G/1000ML
1 SOLUTION TOPICAL DAILY
Refills: 0 | Status: DISCONTINUED | OUTPATIENT
Start: 2021-12-23 | End: 2021-12-25

## 2021-12-23 RX ADMIN — CHLORHEXIDINE GLUCONATE 1 APPLICATION(S): 213 SOLUTION TOPICAL at 22:07

## 2021-12-23 RX ADMIN — Medication 12.5 MILLIGRAM(S): at 17:19

## 2021-12-23 RX ADMIN — OXYCODONE HYDROCHLORIDE 5 MILLIGRAM(S): 5 TABLET ORAL at 02:18

## 2021-12-23 RX ADMIN — FAMOTIDINE 20 MILLIGRAM(S): 10 INJECTION INTRAVENOUS at 17:19

## 2021-12-23 RX ADMIN — OXYCODONE HYDROCHLORIDE 5 MILLIGRAM(S): 5 TABLET ORAL at 09:46

## 2021-12-23 RX ADMIN — OXYCODONE HYDROCHLORIDE 5 MILLIGRAM(S): 5 TABLET ORAL at 21:59

## 2021-12-23 RX ADMIN — FAMOTIDINE 20 MILLIGRAM(S): 10 INJECTION INTRAVENOUS at 05:24

## 2021-12-23 RX ADMIN — Medication 325 MILLIGRAM(S): at 12:00

## 2021-12-23 RX ADMIN — OXYCODONE HYDROCHLORIDE 5 MILLIGRAM(S): 5 TABLET ORAL at 02:17

## 2021-12-23 RX ADMIN — OXYCODONE HYDROCHLORIDE 5 MILLIGRAM(S): 5 TABLET ORAL at 10:16

## 2021-12-23 RX ADMIN — OXYCODONE HYDROCHLORIDE 5 MILLIGRAM(S): 5 TABLET ORAL at 22:11

## 2021-12-23 RX ADMIN — ENOXAPARIN SODIUM 40 MILLIGRAM(S): 100 INJECTION SUBCUTANEOUS at 12:00

## 2021-12-23 RX ADMIN — CLOPIDOGREL BISULFATE 75 MILLIGRAM(S): 75 TABLET, FILM COATED ORAL at 11:59

## 2021-12-23 RX ADMIN — ATORVASTATIN CALCIUM 40 MILLIGRAM(S): 80 TABLET, FILM COATED ORAL at 21:57

## 2021-12-23 RX ADMIN — POLYETHYLENE GLYCOL 3350 17 GRAM(S): 17 POWDER, FOR SOLUTION ORAL at 12:00

## 2021-12-23 NOTE — PROGRESS NOTE ADULT - SUBJECTIVE AND OBJECTIVE BOX
OPERATIVE PROCEDURE(s):    cabg x 3/aicd            POD # 2    SURGEON(s): malia    SUBJECTIVE ASSESSMENT: pt is without complaints     Vital Signs Last 24 Hrs  T(C): 37.4 (23 Dec 2021 10:00), Max: 37.4 (23 Dec 2021 10:00)  T(F): 99.3 (23 Dec 2021 10:00), Max: 99.3 (23 Dec 2021 10:00)  HR: 84 (23 Dec 2021 11:00) (70 - 105)  BP: 113/57 (23 Dec 2021 11:00) (109/58 - 134/77)  BP(mean): 74 (23 Dec 2021 11:00) (74 - 100)  RR: 31 (23 Dec 2021 11:00) (16 - 34)  SpO2: 94% (23 Dec 2021 11:00) (94% - 99%)  12-22-21 @ 07:01  -  12-23-21 @ 07:00  --------------------------------------------------------  IN: 1805.6 mL / OUT: 1214 mL / NET: 591.6 mL    12-23-21 @ 07:01  -  12-23-21 @ 11:56  --------------------------------------------------------  IN: 202.9 mL / OUT: 205 mL / NET: -2.1 mL        Physical Exam  General: alert and oriented x 3  Chest: cta bl  CVS: s12  Abd: pos bs soft nt  GI/ :  Ext: no sig edema    LABS:                        9.2    9.54  )-----------( 111      ( 23 Dec 2021 02:34 )             29.3     COUMADIN:   [ ] YES [x ] NO    PT/INR - ( 22 Dec 2021 02:25 )   PT: 15.70 sec;   INR: 1.37 ratio         PTT - ( 22 Dec 2021 02:25 )  PTT:27.5 sec  12-23    138  |  107  |  23<H>  ----------------------------<  117<H>  4.2   |  21  |  1.0    Ca    8.4<L>      23 Dec 2021 02:34  Mg     2.0     12-23    TPro  5.3<L>  /  Alb  3.6  /  TBili  0.6  /  DBili  x   /  AST  38  /  ALT  11  /  AlkPhos  37  12-23    MEDICATIONS  (STANDING):  albumin human  5% IVPB 3000 milliLiter(s) IV Intermittent once  albuterol/ipratropium for Nebulization 3 milliLiter(s) Nebulizer every 6 hours  aspirin enteric coated 325 milliGRAM(s) Oral daily  atorvastatin 40 milliGRAM(s) Oral at bedtime  chlorhexidine 4% Liquid 1 Application(s) Topical daily  clopidogrel Tablet 75 milliGRAM(s) Oral daily  dextrose 40% Gel 15 Gram(s) Oral once  dextrose 5%. 1000 milliLiter(s) (50 mL/Hr) IV Continuous <Continuous>  dextrose 5%. 1000 milliLiter(s) (100 mL/Hr) IV Continuous <Continuous>  dextrose 50% Injectable 25 Gram(s) IV Push once  dextrose 50% Injectable 12.5 Gram(s) IV Push once  dextrose 50% Injectable 25 Gram(s) IV Push once  enoxaparin Injectable 40 milliGRAM(s) SubCutaneous daily  famotidine    Tablet 20 milliGRAM(s) Oral two times a day  glucagon  Injectable 1 milliGRAM(s) IntraMuscular once  insulin lispro (ADMELOG) corrective regimen sliding scale   SubCutaneous three times a day before meals  metoprolol tartrate 12.5 milliGRAM(s) Oral every 12 hours  polyethylene glycol 3350 17 Gram(s) Oral daily    MEDICATIONS  (PRN):  oxyCODONE    IR 5 milliGRAM(s) Oral every 4 hours PRN Mild Pain (1 - 3)  oxyCODONE    IR 10 milliGRAM(s) Oral every 4 hours PRN Severe Pain (7 - 10)      Allergies    No Known Allergies    Intolerances    Ambulation/Activity Status:  ambulates well with pt      RADIOLOGY & ADDITIONAL TESTS:  EXAM:  XR CHEST PORTABLE ROUTINE 1V                          PROCEDURE DATE:  12/23/2021      INTERPRETATION:  Clinical History/Reason for Exam:  Post-cardiac surgery    Technique:  Chest frontal view.    Comparison: Chest x-ray 12/22/2021.    Findings:    Support devices:  [place]-Angel[place] catheter removed. Stable left ICD and right   IJ introducer sheath.    Cardiac/mediastinum/hilum: Stable    Lung parenchyma/ Pleura: Mild congestion. Stable bilateral basilar   pleural-parenchymalopacities. No pneumothorax      Skeleton/soft tissues: No focal skeletal lesions are identified.      Impression:    Mild congestion. Stable bilateral basilar pleural-parenchymal opacities.   No pneumothorax    --- End of Report ---    Assessment/Plan: Patient is a 72 yo male s/p cabg x 3 / aicd   cont present tx as per ct surgeon  s/p cabg- cont asa, plavix, statin, and start low dose lopressor  d/c primacor  repeat echo in am   encourage incentive spirometry and pain management    Social Service Disposition:  home in a few days

## 2021-12-23 NOTE — PHYSICAL THERAPY INITIAL EVALUATION ADULT - GAIT TRAINING, PT EVAL
Pt will amb 200'x1 Mod I by d/c.          Pt will negotiate 1 flight of stairs Mod I with 1 HR by d/c.

## 2021-12-23 NOTE — PHYSICAL THERAPY INITIAL EVALUATION ADULT - PATIENT PROFILE REVIEW, REHAB EVAL
Chart reviewed. Pt s/p CABGx3/AICD/yes
Chart reviewed. Pt remains on bedrest @ this time./yes
yes
Chart reviewed/yes

## 2021-12-23 NOTE — PROGRESS NOTE ADULT - ASSESSMENT
HPI:  70 yo male, h/p CAD s.p 5 stents , MI 2006 s.p AICD, HTN, DL, COPD not on home oxygen or bipap presented for AICD firing  s/p CABG x3 and ICD and wire palcemt pod #2    A/P  1- CAD atherosclerotic heart disease  asa Plavix  ( has hx of 5 stents       Low ef on milrinon will continue today 0.25  2- AICD malfunction s/p replacmt monitor and f/u by cardiology  3- anemia of acute blood loss  4- thrombocytopenia f/u plt daily    taper d/c milrinon

## 2021-12-23 NOTE — PHYSICAL THERAPY INITIAL EVALUATION ADULT - PERTINENT HX OF CURRENT PROBLEM, REHAB EVAL
presented for AICD firing.  Patient felt 3 episodes of AICD firing. Upon ICD interrogation by cardiac fellow V tach 5x s.p shock was detected.  Subsequent cardiac catheterization on 12/16/2021 revealed severe 2vCAD. Pt now s/p CABGx3 & AICD.

## 2021-12-23 NOTE — PROGRESS NOTE ADULT - SUBJECTIVE AND OBJECTIVE BOX
CTU Attending Progress Daily Note     23 Dec 2021 07:57  POD# - 2 CABG  He has history of COPD (chronic obstructive pulmonary disease)    CAD (coronary artery disease)    Myocardial infarction    CAD (coronary artery disease)    Hypercholesteremia    HTN (hypertension)      Interval event for past 24 hr:  ZUNILDA DENNIS  71y had no event.   Current Complains:  ZUNILDA DENNIS has no new complains  HPI:  70 yo male, h/p CAD s.p 5 stents , MI 2006 s.p AICD, HTN, DL, COPD not on home oxygen or bipap presented for AICD firing  Patient felt 3 episodes of AICD firing causing chest discomfort today staring 6 PM; No similar episode in the past  Last time he followed with EP, 8 months ago, he has been told that one of the leads might not be working  Upon ICD interrogation by cardiac fellow V tach 5x s.p shock was detected   ROS negative for syncope, dizziness, headache, fever , chills, N/V  or respiratory symptoms     in ED 97 F 104 bpm 132/77 mmHg 18  % (15 Dec 2021 22:55)    OBJECTIVE:  ICU Vital Signs Last 24 Hrs  T(C): 36.5 (23 Dec 2021 04:00), Max: 36.8 (22 Dec 2021 17:00)  T(F): 97.7 (23 Dec 2021 04:00), Max: 98.2 (22 Dec 2021 17:00)  HR: 73 (23 Dec 2021 07:00) (69 - 86)  BP: 129/71 (23 Dec 2021 07:00) (109/58 - 131/74)  BP(mean): 94 (23 Dec 2021 07:00) (78 - 97)  ABP: 112/51 (22 Dec 2021 16:00) (108/45 - 127/54)  ABP(mean): 73 (22 Dec 2021 16:00) (65 - 80)  RR: 22 (23 Dec 2021 07:00) (16 - 34)  SpO2: 98% (23 Dec 2021 07:00) (94% - 100%)    I&O's Summary    22 Dec 2021 07:01  -  23 Dec 2021 07:00  --------------------------------------------------------  IN: 1805.6 mL / OUT: 1214 mL / NET: 591.6 mL      I&O's Detail    22 Dec 2021 07:01  -  23 Dec 2021 07:00  --------------------------------------------------------  IN:    Insulin: 1 mL    IV PiggyBack: 100 mL    Milrinone: 84.6 mL    Oral Fluid: 1140 mL    sodium chloride 0.9%: 480 mL  Total IN: 1805.6 mL    OUT:    Chest Tube (mL): 1 mL    Chest Tube (mL): 60 mL    Chest Tube (mL): 50 mL    Indwelling Catheter - Urethral (mL): 1103 mL  Total OUT: 1214 mL    Total NET: 591.6 mL        Adult Advanced Hemodynamics Last 24 Hrs  CVP(mm Hg): 2 (22 Dec 2021 13:30) (0 - 5)  CVP(cm H2O): --  CO: 4.8 (22 Dec 2021 12:30) (4.8 - 7.2)  CI: 2.4 (22 Dec 2021 12:30) (2.4 - 3.6)  PA: 27/9 (22 Dec 2021 13:30) (24/6 - 29/9)  PA(mean): 16 (22 Dec 2021 13:30) (12 - 17)  PCWP: --  SVR: 1206 (22 Dec 2021 10:00) (1206 - 1206)  SVRI: 2366 (22 Dec 2021 10:00) (2366 - 2366)  PVR: --  PVRI: --    CAPILLARY BLOOD GLUCOSE      POCT Blood Glucose.: 127 mg/dL (23 Dec 2021 06:51)  POCT Blood Glucose.: 128 mg/dL (22 Dec 2021 22:02)  POCT Blood Glucose.: 110 mg/dL (22 Dec 2021 16:50)  POCT Blood Glucose.: 104 mg/dL (22 Dec 2021 11:33)  POCT Blood Glucose.: 102 mg/dL (22 Dec 2021 08:08)    LABS:  ABG - ( 22 Dec 2021 03:07 )  pH, Arterial: 7.38  pH, Blood: x     /  pCO2: 41    /  pO2: 99    / HCO3: 24    / Base Excess: -0.8  /  SaO2: 98.7                                    9.2    9.54  )-----------( 111      ( 23 Dec 2021 02:34 )             29.3     12-23    138  |  107  |  23<H>  ----------------------------<  117<H>  4.2   |  21  |  1.0    Ca    8.4<L>      23 Dec 2021 02:34  Mg     2.0     12-23    TPro  5.3<L>  /  Alb  3.6  /  TBili  0.6  /  DBili  x   /  AST  38  /  ALT  11  /  AlkPhos  37  12-23    PT/INR - ( 22 Dec 2021 02:25 )   PT: 15.70 sec;   INR: 1.37 ratio         PTT - ( 22 Dec 2021 02:25 )  PTT:27.5 sec      Home Medications:  amLODIPine 5 mg oral tablet: 1 tab(s) orally once a day (10 Feb 2020 17:27)  aspirin 81 mg oral tablet, chewable: 1 tab(s) orally once a day (10 Feb 2020 17:27)  atorvastatin 40 mg oral tablet: 1 tab(s) orally once a day (10 Feb 2020 17:27)  benazepril 5 mg oral tablet: 1 tab(s) orally once a day (10 Feb 2020 17:27)  Metoprolol Succinate ER 50 mg oral tablet, extended release: 1 tab(s) orally once a day (10 Feb 2020 17:27)  Nitrostat 0.3 mg sublingual tablet: 1 tab(s) sublingual every 5 minutes, As Needed (10 Feb 2020 17:27)  Plavix 75 mg oral tablet: 1 tab(s) orally once a day (10 Feb 2020 17:27)  Ventolin HFA 90 mcg/inh inhalation aerosol: 2 puff(s) inhaled 4 times a day, As Needed (10 Feb 2020 17:27)    HOSPITAL MEDICATIONS:  MEDICATIONS  (STANDING):  albumin human  5% IVPB 3000 milliLiter(s) IV Intermittent once  albuterol/ipratropium for Nebulization 3 milliLiter(s) Nebulizer every 6 hours  aspirin enteric coated 325 milliGRAM(s) Oral daily  atorvastatin 40 milliGRAM(s) Oral at bedtime  clopidogrel Tablet 75 milliGRAM(s) Oral daily  dextrose 40% Gel 15 Gram(s) Oral once  dextrose 5%. 1000 milliLiter(s) (50 mL/Hr) IV Continuous <Continuous>  dextrose 5%. 1000 milliLiter(s) (100 mL/Hr) IV Continuous <Continuous>  dextrose 50% Injectable 25 Gram(s) IV Push once  dextrose 50% Injectable 12.5 Gram(s) IV Push once  dextrose 50% Injectable 25 Gram(s) IV Push once  famotidine    Tablet 20 milliGRAM(s) Oral two times a day  glucagon  Injectable 1 milliGRAM(s) IntraMuscular once  insulin lispro (ADMELOG) corrective regimen sliding scale   SubCutaneous three times a day before meals  milrinone Infusion 0.25 MICROgram(s)/kG/Min (5.85 mL/Hr) IV Continuous <Continuous>  polyethylene glycol 3350 17 Gram(s) Oral daily  sodium chloride 0.9%. 1000 milliLiter(s) (20 mL/Hr) IV Continuous <Continuous>    MEDICATIONS  (PRN):  oxyCODONE    IR 10 milliGRAM(s) Oral every 4 hours PRN Severe Pain (7 - 10)  oxyCODONE    IR 5 milliGRAM(s) Oral every 6 hours PRN Moderate Pain (4 - 6)      REVIEW OF SYSTEMS:  CONSTITUTIONAL: [X] all negative; [ ] weakness, [ ] fevers, [ ] chills  EYES/ENT: [X] all negative; [ ] visual changes, [ ] vertigo, [ ] throat pain   NECK: [X] all negative; [ ] pain, [ ] stiffness  RESPIRATORY: [] all negative, [ ] cough, [ ] wheezing, [ ] hemoptysis, [ ] shortness of breath  CARDIOVASCULAR: [] all negative; [ ] chest pain, [ ] palpitations, [ ] orthopnea  GASTROINTESTINAL: [X] all negative; [ ]abdominal pain, [ ] nausea, [ ] vomiting, [ ] hematemesis, [ ] diarrhea, [ ] constipation, [ ] melena, [ ] hematochezia.  GENITOURINARY: [X] all negative; [ ] dysuria, [ ] frequency, [ ] hematuria  NEUROLOGICAL: [X] all negative; [ ] numbness, [ ] weakness  SKIN: [X] all negative; [ ] itching, [ ] burning, [ ] rashes, [ ] lesions   All other review of systems is negative unless indicated above.    [  ] Unable to assess ROS because     PHYSICAL EXAM:          CONSTITUTIONAL: Well-developed; well-nourished; in no acute distress.   	SKIN: warm, dry  	HEAD: Normocephalic; atraumatic.  	EYES: PERRL, EOM, no conj injection, sclera clear  	ENT: No nasal discharge; airway clear.  	NECK: Supple; non tender.  No midline ttp ctls  	CARD: S1, S2 normal; no murmurs, gallops, or rubs. Regular rate and rhythm. 2+ RPs and DPs bilat, no carotid bruits, no pedal   edema, no calf pain b/l  	RESP: CTA  bilat good air movement No wheezes, rales or rhonchi.  	ABD: Soft, not tender, not distended, no CVA ttp no rebound or guarding, bowel sounds present  	EXT: Normal ROM.  No clubbing, cyanosis or edema.   	  	NEURO: Alert, awake, motor 5/5 R, 5/5 L        RADIOLOGY:  xray  < from: Xray Chest 1 View- PORTABLE-Urgent (Xray Chest 1 View- PORTABLE-Urgent .) (12.22.21 @ 13:05) >  Impression:    Status post a median sternotomy.    Right basilar opacity, new.    Right IJ [place]-Angelica catheter.    Left-sided permanent pacemaker.    --- End of Report ---    < end of copied text >    I spent 45 minutes of critical care time ; more than 50% of visit was spent counseling and/or examining patient, reviewing vitals, labs, medications, imaging and discussing with the team goals of care to prevent life-threatening in this patient who is at high risk for deterioration or death due to:

## 2021-12-23 NOTE — PHYSICAL THERAPY INITIAL EVALUATION ADULT - GENERAL OBSERVATIONS, REHAB EVAL
14:25-14:40 Pt ed: I.S. with just over 750max, splinted cough, sternal pxns, activity restrictions, plan for PT & reason for holding @ this time. Rehab process. Understanding verbalized.
Chart reviewed. attempted PT IE 0900am, [facility] held off PT eval secondary to pt currently off unit for test/procedure. to f/u when available.
09:00-09:40 Pt encountered sitting in bed in chair mode with tele, IV lock, external pacer, bahena, in NAD. Pt agreeable to PT. BP: 114/62, HR: 105bpm, SPO2 96%RA. Pt left sitting in b/s chair with tele, IV lock, external pacer, bahena, in NAD. BP: 125/71, HR: 105bpm, SpO2 95%RA.

## 2021-12-24 LAB
ALBUMIN SERPL ELPH-MCNC: 3.4 G/DL — LOW (ref 3.5–5.2)
ALP SERPL-CCNC: 44 U/L — SIGNIFICANT CHANGE UP (ref 30–115)
ALT FLD-CCNC: 9 U/L — SIGNIFICANT CHANGE UP (ref 0–41)
ANION GAP SERPL CALC-SCNC: 14 MMOL/L — SIGNIFICANT CHANGE UP (ref 7–14)
AST SERPL-CCNC: 28 U/L — SIGNIFICANT CHANGE UP (ref 0–41)
BASOPHILS # BLD AUTO: 0.02 K/UL — SIGNIFICANT CHANGE UP (ref 0–0.2)
BASOPHILS NFR BLD AUTO: 0.2 % — SIGNIFICANT CHANGE UP (ref 0–1)
BILIRUB SERPL-MCNC: 0.8 MG/DL — SIGNIFICANT CHANGE UP (ref 0.2–1.2)
BUN SERPL-MCNC: 19 MG/DL — SIGNIFICANT CHANGE UP (ref 10–20)
CALCIUM SERPL-MCNC: 8.4 MG/DL — LOW (ref 8.5–10.1)
CHLORIDE SERPL-SCNC: 105 MMOL/L — SIGNIFICANT CHANGE UP (ref 98–110)
CO2 SERPL-SCNC: 21 MMOL/L — SIGNIFICANT CHANGE UP (ref 17–32)
CREAT SERPL-MCNC: 0.8 MG/DL — SIGNIFICANT CHANGE UP (ref 0.7–1.5)
EOSINOPHIL # BLD AUTO: 0.03 K/UL — SIGNIFICANT CHANGE UP (ref 0–0.7)
EOSINOPHIL NFR BLD AUTO: 0.3 % — SIGNIFICANT CHANGE UP (ref 0–8)
GLUCOSE BLDC GLUCOMTR-MCNC: 114 MG/DL — HIGH (ref 70–99)
GLUCOSE SERPL-MCNC: 79 MG/DL — SIGNIFICANT CHANGE UP (ref 70–99)
HCT VFR BLD CALC: 30.5 % — LOW (ref 42–52)
HGB BLD-MCNC: 9.5 G/DL — LOW (ref 14–18)
IMM GRANULOCYTES NFR BLD AUTO: 0.6 % — HIGH (ref 0.1–0.3)
LYMPHOCYTES # BLD AUTO: 1.25 K/UL — SIGNIFICANT CHANGE UP (ref 1.2–3.4)
LYMPHOCYTES # BLD AUTO: 11.8 % — LOW (ref 20.5–51.1)
MAGNESIUM SERPL-MCNC: 1.9 MG/DL — SIGNIFICANT CHANGE UP (ref 1.8–2.4)
MCHC RBC-ENTMCNC: 26.4 PG — LOW (ref 27–31)
MCHC RBC-ENTMCNC: 31.1 G/DL — LOW (ref 32–37)
MCV RBC AUTO: 84.7 FL — SIGNIFICANT CHANGE UP (ref 80–94)
MONOCYTES # BLD AUTO: 1.41 K/UL — HIGH (ref 0.1–0.6)
MONOCYTES NFR BLD AUTO: 13.3 % — HIGH (ref 1.7–9.3)
NEUTROPHILS # BLD AUTO: 7.81 K/UL — HIGH (ref 1.4–6.5)
NEUTROPHILS NFR BLD AUTO: 73.8 % — SIGNIFICANT CHANGE UP (ref 42.2–75.2)
NRBC # BLD: 0 /100 WBCS — SIGNIFICANT CHANGE UP (ref 0–0)
PLATELET # BLD AUTO: 109 K/UL — LOW (ref 130–400)
POTASSIUM SERPL-MCNC: 4.1 MMOL/L — SIGNIFICANT CHANGE UP (ref 3.5–5)
POTASSIUM SERPL-SCNC: 4.1 MMOL/L — SIGNIFICANT CHANGE UP (ref 3.5–5)
PROT SERPL-MCNC: 5.4 G/DL — LOW (ref 6–8)
RBC # BLD: 3.6 M/UL — LOW (ref 4.7–6.1)
RBC # FLD: 15.8 % — HIGH (ref 11.5–14.5)
SODIUM SERPL-SCNC: 140 MMOL/L — SIGNIFICANT CHANGE UP (ref 135–146)
WBC # BLD: 10.58 K/UL — SIGNIFICANT CHANGE UP (ref 4.8–10.8)
WBC # FLD AUTO: 10.58 K/UL — SIGNIFICANT CHANGE UP (ref 4.8–10.8)

## 2021-12-24 PROCEDURE — 93306 TTE W/DOPPLER COMPLETE: CPT | Mod: 26

## 2021-12-24 PROCEDURE — 93010 ELECTROCARDIOGRAM REPORT: CPT

## 2021-12-24 PROCEDURE — 93308 TTE F-UP OR LMTD: CPT | Mod: 26

## 2021-12-24 PROCEDURE — 71045 X-RAY EXAM CHEST 1 VIEW: CPT | Mod: 26

## 2021-12-24 RX ORDER — POTASSIUM CHLORIDE 20 MEQ
20 PACKET (EA) ORAL ONCE
Refills: 0 | Status: COMPLETED | OUTPATIENT
Start: 2021-12-24 | End: 2021-12-24

## 2021-12-24 RX ORDER — MAGNESIUM SULFATE 500 MG/ML
1 VIAL (ML) INJECTION ONCE
Refills: 0 | Status: COMPLETED | OUTPATIENT
Start: 2021-12-24 | End: 2021-12-24

## 2021-12-24 RX ORDER — SENNA PLUS 8.6 MG/1
2 TABLET ORAL DAILY
Refills: 0 | Status: DISCONTINUED | OUTPATIENT
Start: 2021-12-24 | End: 2021-12-25

## 2021-12-24 RX ORDER — FUROSEMIDE 40 MG
40 TABLET ORAL ONCE
Refills: 0 | Status: COMPLETED | OUTPATIENT
Start: 2021-12-24 | End: 2021-12-24

## 2021-12-24 RX ADMIN — OXYCODONE HYDROCHLORIDE 5 MILLIGRAM(S): 5 TABLET ORAL at 20:00

## 2021-12-24 RX ADMIN — OXYCODONE HYDROCHLORIDE 5 MILLIGRAM(S): 5 TABLET ORAL at 06:38

## 2021-12-24 RX ADMIN — Medication 12.5 MILLIGRAM(S): at 17:08

## 2021-12-24 RX ADMIN — Medication 325 MILLIGRAM(S): at 11:58

## 2021-12-24 RX ADMIN — Medication 40 MILLIGRAM(S): at 11:00

## 2021-12-24 RX ADMIN — FAMOTIDINE 20 MILLIGRAM(S): 10 INJECTION INTRAVENOUS at 17:08

## 2021-12-24 RX ADMIN — ATORVASTATIN CALCIUM 40 MILLIGRAM(S): 80 TABLET, FILM COATED ORAL at 21:32

## 2021-12-24 RX ADMIN — CLOPIDOGREL BISULFATE 75 MILLIGRAM(S): 75 TABLET, FILM COATED ORAL at 11:54

## 2021-12-24 RX ADMIN — POLYETHYLENE GLYCOL 3350 17 GRAM(S): 17 POWDER, FOR SOLUTION ORAL at 11:55

## 2021-12-24 RX ADMIN — Medication 20 MILLIEQUIVALENT(S): at 11:00

## 2021-12-24 RX ADMIN — SENNA PLUS 2 TABLET(S): 8.6 TABLET ORAL at 17:09

## 2021-12-24 RX ADMIN — Medication 12.5 MILLIGRAM(S): at 05:50

## 2021-12-24 RX ADMIN — FAMOTIDINE 20 MILLIGRAM(S): 10 INJECTION INTRAVENOUS at 05:50

## 2021-12-24 RX ADMIN — OXYCODONE HYDROCHLORIDE 5 MILLIGRAM(S): 5 TABLET ORAL at 06:21

## 2021-12-24 RX ADMIN — CHLORHEXIDINE GLUCONATE 1 APPLICATION(S): 213 SOLUTION TOPICAL at 20:00

## 2021-12-24 RX ADMIN — ENOXAPARIN SODIUM 40 MILLIGRAM(S): 100 INJECTION SUBCUTANEOUS at 11:55

## 2021-12-24 RX ADMIN — OXYCODONE HYDROCHLORIDE 5 MILLIGRAM(S): 5 TABLET ORAL at 20:30

## 2021-12-24 RX ADMIN — Medication 100 GRAM(S): at 07:02

## 2021-12-24 NOTE — PROGRESS NOTE ADULT - REASON FOR ADMISSION
AICD firing

## 2021-12-24 NOTE — PROGRESS NOTE ADULT - SUBJECTIVE AND OBJECTIVE BOX
OPERATIVE PROCEDURE(s):   CAbgx3/AICD             POD #  3                     SURGEON(s): NAVIN Case  SUBJECTIVE ASSESSMENT:71yMale patient seen and examined at bedside.    Vital Signs Last 24 Hrs  T(F): 98.7 (24 Dec 2021 04:00), Max: 100.1 (23 Dec 2021 16:00)  HR: 69 (24 Dec 2021 07:00) (69 - 105)  BP: 114/70 (24 Dec 2021 07:00) (94/67 - 138/85)  BP(mean): 87 (24 Dec 2021 07:00) (74 - 106)  RR: 20 (24 Dec 2021 07:00) (19 - 36)  SpO2: 96% (24 Dec 2021 07:00) (93% - 99%)    I&O's Detail    23 Dec 2021 07:01  -  24 Dec 2021 07:00  --------------------------------------------------------  IN:    IV PiggyBack: 100 mL    Milrinone: 2.9 mL    Oral Fluid: 1290 mL    sodium chloride 0.9%: 20 mL  Total IN: 1412.9 mL    OUT:    Indwelling Catheter - Urethral (mL): 205 mL    Voided (mL): 1375 mL  Total OUT: 1580 mL    Net: I&O's Detail    22 Dec 2021 07:01  -  23 Dec 2021 07:00  --------------------------------------------------------  Total NET: 591.6 mL    23 Dec 2021 07:01  -  24 Dec 2021 07:00  --------------------------------------------------------  Total NET: -167.1 mL    CAPILLARY BLOOD GLUCOSE  POCT Blood Glucose.: 114 mg/dL (24 Dec 2021 06:54)  POCT Blood Glucose.: 127 mg/dL (23 Dec 2021 22:01)  POCT Blood Glucose.: 103 mg/dL (23 Dec 2021 16:22)  POCT Blood Glucose.: 130 mg/dL (23 Dec 2021 12:02)    A1C with Estimated Average Glucose Result: 5.7 % (12-16-21 @ 04:30)      Physical Exam:  General: NAD; A&Ox3  Cardiac: S1/S2, RRR, no murmur, no rubs  Lungs: unlabored respirations, CTA b/l, no wheeze, no rales, no crackles  Abdomen: Soft/NT/ND; positive bowel sounds x 4  Sternum: Intact, no click, incision healing well with no drainage  Incisions: Incisions clean/dry/intact  Extremities: No edema b/l lower extremities; good capillary refill; no cyanosis; palpable 1+ pedal pulses b/l    Central Venous Catheter: Yes: critical illness, intravenous access  Day # 3  [place] Catheter: Yes: critical illness; monitor strict i/o's                 Day #  EPICARDIAL WIRES:  YES                                                          Day #  BOWEL MOVEMENT:  [] YES [] NO, If No, Timing since last BM Day #        LABS:                        9.5<L>  10.58 )-----------( 109<L>    ( 24 Dec 2021 02:45 )             30.5<L>                        9.2<L>  9.54  )-----------( 111<L>    ( 23 Dec 2021 02:34 )             29.3<L>    12-24    140  |  105  |  19  ----------------------------<  79  4.1   |  21  |  0.8  12-23    138  |  107  |  23<H>  ----------------------------<  117<H>  4.2   |  21  |  1.0    Ca    8.4<L>      24 Dec 2021 02:45  Mg     1.9     12-24    TPro  5.4<L> [6.0 - 8.0]  /  Alb  3.4<L> [3.5 - 5.2]  /  TBili  0.8 [0.2 - 1.2]  /  DBili  x   /  AST  28 [0 - 41]  /  ALT  9 [0 - 41]  /  AlkPhos  44 [30 - 115]  12-24      Allergies  No Known Allergies  Intolerances      MEDICATIONS  (STANDING):  albuterol/ipratropium for Nebulization 3 milliLiter(s) Nebulizer every 6 hours  aspirin enteric coated 325 milliGRAM(s) Oral daily  atorvastatin 40 milliGRAM(s) Oral at bedtime  chlorhexidine 4% Liquid 1 Application(s) Topical daily  clopidogrel Tablet 75 milliGRAM(s) Oral daily  dextrose 40% Gel 15 Gram(s) Oral once  dextrose 5%. 1000 milliLiter(s) (50 mL/Hr) IV Continuous <Continuous>  dextrose 5%. 1000 milliLiter(s) (100 mL/Hr) IV Continuous <Continuous>  dextrose 50% Injectable 25 Gram(s) IV Push once  dextrose 50% Injectable 12.5 Gram(s) IV Push once  dextrose 50% Injectable 25 Gram(s) IV Push once  enoxaparin Injectable 40 milliGRAM(s) SubCutaneous daily  famotidine    Tablet 20 milliGRAM(s) Oral two times a day  glucagon  Injectable 1 milliGRAM(s) IntraMuscular once  insulin lispro (ADMELOG) corrective regimen sliding scale   SubCutaneous three times a day before meals  metoprolol tartrate 12.5 milliGRAM(s) Oral every 12 hours  polyethylene glycol 3350 17 Gram(s) Oral daily    MEDICATIONS  (PRN):  oxyCODONE    IR 5 milliGRAM(s) Oral every 4 hours PRN Mild Pain (1 - 3)  oxyCODONE    IR 10 milliGRAM(s) Oral every 4 hours PRN Severe Pain (7 - 10)    Home Medications:  amLODIPine 5 mg oral tablet: 1 tab(s) orally once a day (10 Feb 2020 17:27)  aspirin 81 mg oral tablet, chewable: 1 tab(s) orally once a day (10 Feb 2020 17:27)  atorvastatin 40 mg oral tablet: 1 tab(s) orally once a day (10 Feb 2020 17:27)  benazepril 5 mg oral tablet: 1 tab(s) orally once a day (10 Feb 2020 17:27)  Metoprolol Succinate ER 50 mg oral tablet, extended release: 1 tab(s) orally once a day (10 Feb 2020 17:27)  Nitrostat 0.3 mg sublingual tablet: 1 tab(s) sublingual every 5 minutes, As Needed (10 Feb 2020 17:27)  Plavix 75 mg oral tablet: 1 tab(s) orally once a day (10 Feb 2020 17:27)  Ventolin HFA 90 mcg/inh inhalation aerosol: 2 puff(s) inhaled 4 times a day, As Needed (10 Feb 2020 17:27)      Post-Op Beta-Blockers: [x] Yes, [] No: contraindication:  Post-Op Aspirin:  [x] Yes, [] No: contraindication:  Post-Op Statin:  [x] Yes, [] No: contraindication:    Ambulation/Activity Status: OOB/AMB    Assessment/Plan:  71y Male status-post CAbgx3/AICD             POD #  3   - Case and plan discussed with CTU Intensivist and CT Surgeon - Dr. Case  - Continue CTU supportive care and ongoing plan of care as per continuing CTU rounds.   - Continue DVT/GI prophylaxis  - Incentive Spirometry 10 times an hour  - Continue to advance physical activity as tolerated and continue PT/OT as directed  1. CAD s/p CABG: Continue ASA/plavix, statin, BB  2. ICM S/p AICD: V tach x5 episodes w/ EF 30-35% pre-op. Pt weaned off inotropic support. Repeat TTE today.    3. COPD/Hypoxia:    4. DM/Glucose Control: Non-diabetic w/A1c 5.7; FS well controlled on RISS. Will d/c today.    Social Service Disposition:  Home likely POD#4-5.   OPERATIVE PROCEDURE(s):   CAbgx3/AICD             POD #  3                     SURGEON(s): NAVIN Case  SUBJECTIVE ASSESSMENT:71yMale patient seen and examined at bedside.    Vital Signs Last 24 Hrs  T(F): 98.7 (24 Dec 2021 04:00), Max: 100.1 (23 Dec 2021 16:00)  HR: 69 (24 Dec 2021 07:00) (69 - 105)  BP: 114/70 (24 Dec 2021 07:00) (94/67 - 138/85)  BP(mean): 87 (24 Dec 2021 07:00) (74 - 106)  RR: 20 (24 Dec 2021 07:00) (19 - 36)  SpO2: 96% (24 Dec 2021 07:00) (93% - 99%) RA    I&O's Detail    23 Dec 2021 07:01  -  24 Dec 2021 07:00  --------------------------------------------------------  IN:    IV PiggyBack: 100 mL    Milrinone: 2.9 mL    Oral Fluid: 1290 mL    sodium chloride 0.9%: 20 mL  Total IN: 1412.9 mL    OUT:    Indwelling Catheter - Urethral (mL): 205 mL    Voided (mL): 1375 mL  Total OUT: 1580 mL    Net: I&O's Detail    22 Dec 2021 07:01  -  23 Dec 2021 07:00  --------------------------------------------------------  Total NET: 591.6 mL    23 Dec 2021 07:01  -  24 Dec 2021 07:00  --------------------------------------------------------  Total NET: -167.1 mL    CAPILLARY BLOOD GLUCOSE  POCT Blood Glucose.: 114 mg/dL (24 Dec 2021 06:54)  POCT Blood Glucose.: 127 mg/dL (23 Dec 2021 22:01)  POCT Blood Glucose.: 103 mg/dL (23 Dec 2021 16:22)  POCT Blood Glucose.: 130 mg/dL (23 Dec 2021 12:02)    A1C with Estimated Average Glucose Result: 5.7 % (12-16-21 @ 04:30)      Physical Exam:  General: NAD; A&Ox3  Cardiac: S1/S2, RRR, no murmur, no rubs  Lungs: unlabored respirations, CTA b/l, no wheeze, no rales, no crackles  Abdomen: Soft/NT/ND; positive bowel sounds x 4  Sternum: Intact, no click, incision healing well with no drainage  Incisions: Incisions clean/dry/intact  Extremities: No edema b/l lower extremities; good capillary refill; no cyanosis; palpable 1+ pedal pulses b/l    Central Venous Catheter: Yes: critical illness, intravenous access  Day # 3  BOWEL MOVEMENT:  [] YES [x] NO, If No, Timing since last BM Day # 5        LABS:                        9.5<L>  10.58 )-----------( 109<L>    ( 24 Dec 2021 02:45 )             30.5<L>                        9.2<L>  9.54  )-----------( 111<L>    ( 23 Dec 2021 02:34 )             29.3<L>    12-24    140  |  105  |  19  ----------------------------<  79  4.1   |  21  |  0.8  12-23    138  |  107  |  23<H>  ----------------------------<  117<H>  4.2   |  21  |  1.0    Ca    8.4<L>      24 Dec 2021 02:45  Mg     1.9     12-24    TPro  5.4<L> [6.0 - 8.0]  /  Alb  3.4<L> [3.5 - 5.2]  /  TBili  0.8 [0.2 - 1.2]  /  DBili  x   /  AST  28 [0 - 41]  /  ALT  9 [0 - 41]  /  AlkPhos  44 [30 - 115]  12-24      Allergies  No Known Allergies  Intolerances      MEDICATIONS  (STANDING):  albuterol/ipratropium for Nebulization 3 milliLiter(s) Nebulizer every 6 hours  aspirin enteric coated 325 milliGRAM(s) Oral daily  atorvastatin 40 milliGRAM(s) Oral at bedtime  chlorhexidine 4% Liquid 1 Application(s) Topical daily  clopidogrel Tablet 75 milliGRAM(s) Oral daily  dextrose 40% Gel 15 Gram(s) Oral once  dextrose 5%. 1000 milliLiter(s) (50 mL/Hr) IV Continuous <Continuous>  dextrose 5%. 1000 milliLiter(s) (100 mL/Hr) IV Continuous <Continuous>  dextrose 50% Injectable 25 Gram(s) IV Push once  dextrose 50% Injectable 12.5 Gram(s) IV Push once  dextrose 50% Injectable 25 Gram(s) IV Push once  enoxaparin Injectable 40 milliGRAM(s) SubCutaneous daily  famotidine    Tablet 20 milliGRAM(s) Oral two times a day  glucagon  Injectable 1 milliGRAM(s) IntraMuscular once  insulin lispro (ADMELOG) corrective regimen sliding scale   SubCutaneous three times a day before meals  metoprolol tartrate 12.5 milliGRAM(s) Oral every 12 hours  polyethylene glycol 3350 17 Gram(s) Oral daily    MEDICATIONS  (PRN):  oxyCODONE    IR 5 milliGRAM(s) Oral every 4 hours PRN Mild Pain (1 - 3)  oxyCODONE    IR 10 milliGRAM(s) Oral every 4 hours PRN Severe Pain (7 - 10)    Home Medications:  amLODIPine 5 mg oral tablet: 1 tab(s) orally once a day (10 Feb 2020 17:27)  aspirin 81 mg oral tablet, chewable: 1 tab(s) orally once a day (10 Feb 2020 17:27)  atorvastatin 40 mg oral tablet: 1 tab(s) orally once a day (10 Feb 2020 17:27)  benazepril 5 mg oral tablet: 1 tab(s) orally once a day (10 Feb 2020 17:27)  Metoprolol Succinate ER 50 mg oral tablet, extended release: 1 tab(s) orally once a day (10 Feb 2020 17:27)  Nitrostat 0.3 mg sublingual tablet: 1 tab(s) sublingual every 5 minutes, As Needed (10 Feb 2020 17:27)  Plavix 75 mg oral tablet: 1 tab(s) orally once a day (10 Feb 2020 17:27)  Ventolin HFA 90 mcg/inh inhalation aerosol: 2 puff(s) inhaled 4 times a day, As Needed (10 Feb 2020 17:27)      Post-Op Beta-Blockers: [x] Yes, [] No: contraindication:  Post-Op Aspirin:  [x] Yes, [] No: contraindication:  Post-Op Statin:  [x] Yes, [] No: contraindication:    Ambulation/Activity Status: OOB/AMB    Assessment/Plan:  71y Male status-post CAbgx3/AICD             POD #  3   - Case and plan discussed with CTU Intensivist and CT Surgeon - Dr. Case  - Continue CTU supportive care and ongoing plan of care as per continuing CTU rounds.   - Continue DVT/GI prophylaxis  - Incentive Spirometry 10 times an hour  - Continue to advance physical activity as tolerated and continue PT/OT as directed  1. CAD s/p CABG: Continue ASA/plavix, statin, BB  2. ICM S/p AICD: V tach x5 episodes w/ EF 30-35% pre-op. Pt weaned off inotropic support. Repeat TTE today.    3. COPD/Hypoxia: Sao2 >95% on RA.    4. DM/Glucose Control: Non-diabetic w/A1c 5.7; FS well controlled on RISS. Will d/c today.    Social Service Disposition:  Home likely POD#4-5.

## 2021-12-24 NOTE — PROGRESS NOTE ADULT - PROVIDER SPECIALTY LIST ADULT
CT Surgery
Internal Medicine
CT Surgery
Critical Care
Critical Care
Electrophysiology
Hospitalist
Internal Medicine
Internal Medicine

## 2021-12-25 ENCOUNTER — TRANSCRIPTION ENCOUNTER (OUTPATIENT)
Age: 71
End: 2021-12-25

## 2021-12-25 VITALS — DIASTOLIC BLOOD PRESSURE: 65 MMHG | HEART RATE: 88 BPM | SYSTOLIC BLOOD PRESSURE: 115 MMHG

## 2021-12-25 LAB
ALBUMIN SERPL ELPH-MCNC: 3.9 G/DL — SIGNIFICANT CHANGE UP (ref 3.5–5.2)
ALP SERPL-CCNC: 54 U/L — SIGNIFICANT CHANGE UP (ref 30–115)
ALT FLD-CCNC: 17 U/L — SIGNIFICANT CHANGE UP (ref 0–41)
ANION GAP SERPL CALC-SCNC: 13 MMOL/L — SIGNIFICANT CHANGE UP (ref 7–14)
AST SERPL-CCNC: 28 U/L — SIGNIFICANT CHANGE UP (ref 0–41)
BASOPHILS # BLD AUTO: 0.04 K/UL — SIGNIFICANT CHANGE UP (ref 0–0.2)
BASOPHILS NFR BLD AUTO: 0.5 % — SIGNIFICANT CHANGE UP (ref 0–1)
BILIRUB SERPL-MCNC: 0.7 MG/DL — SIGNIFICANT CHANGE UP (ref 0.2–1.2)
BUN SERPL-MCNC: 25 MG/DL — HIGH (ref 10–20)
CALCIUM SERPL-MCNC: 8.9 MG/DL — SIGNIFICANT CHANGE UP (ref 8.5–10.1)
CHLORIDE SERPL-SCNC: 103 MMOL/L — SIGNIFICANT CHANGE UP (ref 98–110)
CO2 SERPL-SCNC: 21 MMOL/L — SIGNIFICANT CHANGE UP (ref 17–32)
CREAT SERPL-MCNC: 0.9 MG/DL — SIGNIFICANT CHANGE UP (ref 0.7–1.5)
EOSINOPHIL # BLD AUTO: 0.06 K/UL — SIGNIFICANT CHANGE UP (ref 0–0.7)
EOSINOPHIL NFR BLD AUTO: 0.7 % — SIGNIFICANT CHANGE UP (ref 0–8)
GLUCOSE SERPL-MCNC: 85 MG/DL — SIGNIFICANT CHANGE UP (ref 70–99)
HCT VFR BLD CALC: 36.8 % — LOW (ref 42–52)
HGB BLD-MCNC: 11.7 G/DL — LOW (ref 14–18)
IMM GRANULOCYTES NFR BLD AUTO: 0.5 % — HIGH (ref 0.1–0.3)
LYMPHOCYTES # BLD AUTO: 1.29 K/UL — SIGNIFICANT CHANGE UP (ref 1.2–3.4)
LYMPHOCYTES # BLD AUTO: 14.6 % — LOW (ref 20.5–51.1)
MAGNESIUM SERPL-MCNC: 2 MG/DL — SIGNIFICANT CHANGE UP (ref 1.8–2.4)
MCHC RBC-ENTMCNC: 26.4 PG — LOW (ref 27–31)
MCHC RBC-ENTMCNC: 31.8 G/DL — LOW (ref 32–37)
MCV RBC AUTO: 83.1 FL — SIGNIFICANT CHANGE UP (ref 80–94)
MONOCYTES # BLD AUTO: 1.19 K/UL — HIGH (ref 0.1–0.6)
MONOCYTES NFR BLD AUTO: 13.4 % — HIGH (ref 1.7–9.3)
NEUTROPHILS # BLD AUTO: 6.24 K/UL — SIGNIFICANT CHANGE UP (ref 1.4–6.5)
NEUTROPHILS NFR BLD AUTO: 70.3 % — SIGNIFICANT CHANGE UP (ref 42.2–75.2)
NRBC # BLD: 0 /100 WBCS — SIGNIFICANT CHANGE UP (ref 0–0)
PLATELET # BLD AUTO: 138 K/UL — SIGNIFICANT CHANGE UP (ref 130–400)
POTASSIUM SERPL-MCNC: 4.2 MMOL/L — SIGNIFICANT CHANGE UP (ref 3.5–5)
POTASSIUM SERPL-SCNC: 4.2 MMOL/L — SIGNIFICANT CHANGE UP (ref 3.5–5)
PROT SERPL-MCNC: 6 G/DL — SIGNIFICANT CHANGE UP (ref 6–8)
RBC # BLD: 4.43 M/UL — LOW (ref 4.7–6.1)
RBC # FLD: 15.7 % — HIGH (ref 11.5–14.5)
SODIUM SERPL-SCNC: 137 MMOL/L — SIGNIFICANT CHANGE UP (ref 135–146)
WBC # BLD: 8.86 K/UL — SIGNIFICANT CHANGE UP (ref 4.8–10.8)
WBC # FLD AUTO: 8.86 K/UL — SIGNIFICANT CHANGE UP (ref 4.8–10.8)

## 2021-12-25 PROCEDURE — 93010 ELECTROCARDIOGRAM REPORT: CPT

## 2021-12-25 PROCEDURE — 71045 X-RAY EXAM CHEST 1 VIEW: CPT | Mod: 26

## 2021-12-25 RX ORDER — ATORVASTATIN CALCIUM 80 MG/1
1 TABLET, FILM COATED ORAL
Qty: 30 | Refills: 0
Start: 2021-12-25 | End: 2022-01-23

## 2021-12-25 RX ORDER — METOPROLOL TARTRATE 50 MG
1 TABLET ORAL
Qty: 0 | Refills: 0 | DISCHARGE

## 2021-12-25 RX ORDER — NITROGLYCERIN 6.5 MG
1 CAPSULE, EXTENDED RELEASE ORAL
Qty: 0 | Refills: 0 | DISCHARGE

## 2021-12-25 RX ORDER — AMLODIPINE BESYLATE 2.5 MG/1
1 TABLET ORAL
Qty: 0 | Refills: 0 | DISCHARGE

## 2021-12-25 RX ORDER — ASPIRIN/CALCIUM CARB/MAGNESIUM 324 MG
1 TABLET ORAL
Qty: 30 | Refills: 0
Start: 2021-12-25 | End: 2022-01-23

## 2021-12-25 RX ORDER — FUROSEMIDE 40 MG
1 TABLET ORAL
Qty: 5 | Refills: 0
Start: 2021-12-25 | End: 2021-12-29

## 2021-12-25 RX ORDER — POLYETHYLENE GLYCOL 3350 17 G/17G
17 POWDER, FOR SOLUTION ORAL
Qty: 510 | Refills: 0
Start: 2021-12-25 | End: 2022-01-23

## 2021-12-25 RX ORDER — CLOPIDOGREL BISULFATE 75 MG/1
1 TABLET, FILM COATED ORAL
Qty: 30 | Refills: 0
Start: 2021-12-25 | End: 2022-01-23

## 2021-12-25 RX ORDER — CLOPIDOGREL BISULFATE 75 MG/1
1 TABLET, FILM COATED ORAL
Qty: 0 | Refills: 0 | DISCHARGE

## 2021-12-25 RX ORDER — BENAZEPRIL HYDROCHLORIDE 40 MG/1
1 TABLET ORAL
Qty: 0 | Refills: 0 | DISCHARGE

## 2021-12-25 RX ORDER — METOPROLOL TARTRATE 50 MG
0.5 TABLET ORAL
Qty: 30 | Refills: 0
Start: 2021-12-25 | End: 2022-01-23

## 2021-12-25 RX ORDER — ASPIRIN/CALCIUM CARB/MAGNESIUM 324 MG
1 TABLET ORAL
Qty: 0 | Refills: 0 | DISCHARGE

## 2021-12-25 RX ORDER — POTASSIUM CHLORIDE 20 MEQ
1 PACKET (EA) ORAL
Qty: 5 | Refills: 0
Start: 2021-12-25 | End: 2021-12-29

## 2021-12-25 RX ORDER — SENNA PLUS 8.6 MG/1
2 TABLET ORAL
Qty: 60 | Refills: 0
Start: 2021-12-25 | End: 2022-01-23

## 2021-12-25 RX ORDER — ATORVASTATIN CALCIUM 80 MG/1
1 TABLET, FILM COATED ORAL
Qty: 0 | Refills: 0 | DISCHARGE

## 2021-12-25 RX ORDER — FAMOTIDINE 10 MG/ML
1 INJECTION INTRAVENOUS
Qty: 60 | Refills: 0
Start: 2021-12-25 | End: 2022-01-23

## 2021-12-25 RX ADMIN — FAMOTIDINE 20 MILLIGRAM(S): 10 INJECTION INTRAVENOUS at 05:59

## 2021-12-25 RX ADMIN — Medication 325 MILLIGRAM(S): at 11:17

## 2021-12-25 RX ADMIN — SENNA PLUS 2 TABLET(S): 8.6 TABLET ORAL at 11:17

## 2021-12-25 RX ADMIN — POLYETHYLENE GLYCOL 3350 17 GRAM(S): 17 POWDER, FOR SOLUTION ORAL at 11:17

## 2021-12-25 RX ADMIN — Medication 12.5 MILLIGRAM(S): at 05:59

## 2021-12-25 RX ADMIN — ENOXAPARIN SODIUM 40 MILLIGRAM(S): 100 INJECTION SUBCUTANEOUS at 11:17

## 2021-12-25 RX ADMIN — CLOPIDOGREL BISULFATE 75 MILLIGRAM(S): 75 TABLET, FILM COATED ORAL at 11:17

## 2021-12-25 NOTE — DISCHARGE NOTE NURSING/CASE MANAGEMENT/SOCIAL WORK - NSDCFUADDAPPT_GEN_ALL_CORE_FT
Please call Dr. Case's office at [phone] on Monday morning at 9:30 am on 12/27/21 to schedule appointment time.

## 2021-12-25 NOTE — DISCHARGE NOTE NURSING/CASE MANAGEMENT/SOCIAL WORK - PATIENT PORTAL LINK FT
You can access the FollowMyHealth Patient Portal offered by [facility] by registering at the following website: [email or web address]. By joining [facility]’s FollowMyHealth portal, you will also be able to view your health information using other applications (apps) compatible with our system.

## 2021-12-25 NOTE — DISCHARGE NOTE NURSING/CASE MANAGEMENT/SOCIAL WORK - NSDCPEWEB_GEN_ALL_CORE
[facility] for Tobacco Control website --- [email or web address] website --- [email or web address]

## 2021-12-25 NOTE — DISCHARGE NOTE NURSING/CASE MANAGEMENT/SOCIAL WORK - NSDCPEEMAIL_GEN_ALL_CORE
[facility] for Tobacco Control email [email or web address]

## 2021-12-25 NOTE — DISCHARGE NOTE NURSING/CASE MANAGEMENT/SOCIAL WORK - NSDCPEFALRISK_GEN_ALL_CORE
For information on Fall & Injury Prevention, visit: [email or web address] OR  [email or web address] OR  https://www.cdc.gov/steadi/patient.html

## 2021-12-26 ENCOUNTER — TRANSCRIPTION ENCOUNTER (OUTPATIENT)
Age: 71
End: 2021-12-26

## 2021-12-27 ENCOUNTER — NON-APPOINTMENT (OUTPATIENT)
Age: 71
End: 2021-12-27

## 2021-12-27 RX ORDER — ASPIRIN 81 MG
81 TABLET, DELAYED RELEASE (ENTERIC COATED) ORAL
Refills: 0 | Status: DISCONTINUED | COMMUNITY
End: 2021-12-27

## 2021-12-27 RX ORDER — AMLODIPINE BESYLATE 5 MG/1
5 TABLET ORAL
Qty: 90 | Refills: 0 | Status: DISCONTINUED | COMMUNITY
Start: 2018-05-04 | End: 2021-12-27

## 2021-12-27 RX ORDER — BENAZEPRIL HYDROCHLORIDE 5 MG/1
5 TABLET ORAL
Refills: 0 | Status: DISCONTINUED | COMMUNITY
End: 2021-12-27

## 2021-12-27 RX ORDER — METOPROLOL SUCCINATE 50 MG/1
50 TABLET, EXTENDED RELEASE ORAL
Refills: 0 | Status: DISCONTINUED | COMMUNITY
End: 2021-12-27

## 2021-12-27 RX ORDER — NITROGLYCERIN 0.3 MG/1
0.3 TABLET SUBLINGUAL
Refills: 0 | Status: DISCONTINUED | COMMUNITY
End: 2021-12-27

## 2021-12-27 RX ORDER — AMLODIPINE BESYLATE AND BENAZEPRIL HYDROCHLORIDE 5; 10 MG/1; MG/1
5-10 CAPSULE ORAL
Refills: 0 | Status: DISCONTINUED | COMMUNITY
End: 2021-12-27

## 2021-12-27 RX ORDER — SIMVASTATIN 40 MG/1
40 TABLET, FILM COATED ORAL
Refills: 0 | Status: DISCONTINUED | COMMUNITY
End: 2021-12-27

## 2021-12-29 ENCOUNTER — APPOINTMENT (OUTPATIENT)
Dept: CARDIOTHORACIC SURGERY | Facility: CLINIC | Age: 71
End: 2021-12-29
Payer: MEDICARE

## 2021-12-29 ENCOUNTER — NON-APPOINTMENT (OUTPATIENT)
Age: 71
End: 2021-12-29

## 2021-12-29 ENCOUNTER — OUTPATIENT (OUTPATIENT)
Dept: OUTPATIENT SERVICES | Facility: HOSPITAL | Age: 71
LOS: 1 days | Discharge: HOME | End: 2021-12-29
Payer: MEDICARE

## 2021-12-29 VITALS
OXYGEN SATURATION: 96 % | RESPIRATION RATE: 18 BRPM | DIASTOLIC BLOOD PRESSURE: 82 MMHG | HEART RATE: 88 BPM | HEIGHT: 71 IN | SYSTOLIC BLOOD PRESSURE: 130 MMHG | BODY MASS INDEX: 23.1 KG/M2 | TEMPERATURE: 97.6 F | WEIGHT: 165 LBS

## 2021-12-29 DIAGNOSIS — Z00.00 ENCOUNTER FOR GENERAL ADULT MEDICAL EXAMINATION WITHOUT ABNORMAL FINDINGS: ICD-10-CM

## 2021-12-29 DIAGNOSIS — I25.10 ATHEROSCLEROTIC HEART DISEASE OF NATIVE CORONARY ARTERY WITHOUT ANGINA PECTORIS: Chronic | ICD-10-CM

## 2021-12-29 DIAGNOSIS — Z95.5 PRESENCE OF CORONARY ANGIOPLASTY IMPLANT AND GRAFT: Chronic | ICD-10-CM

## 2021-12-29 PROCEDURE — 99024 POSTOP FOLLOW-UP VISIT: CPT

## 2021-12-29 PROCEDURE — 71046 X-RAY EXAM CHEST 2 VIEWS: CPT | Mod: 26

## 2021-12-29 RX ORDER — FUROSEMIDE 40 MG/1
40 TABLET ORAL
Refills: 0 | Status: DISCONTINUED | COMMUNITY
Start: 2021-12-27 | End: 2021-12-29

## 2021-12-29 RX ORDER — POTASSIUM CHLORIDE 750 MG/1
10 TABLET, EXTENDED RELEASE ORAL
Refills: 0 | Status: DISCONTINUED | COMMUNITY
Start: 2021-12-27 | End: 2021-12-29

## 2022-01-05 ENCOUNTER — APPOINTMENT (OUTPATIENT)
Dept: CARDIOTHORACIC SURGERY | Facility: CLINIC | Age: 72
End: 2022-01-05
Payer: MEDICARE

## 2022-01-05 VITALS
HEART RATE: 79 BPM | RESPIRATION RATE: 12 BRPM | OXYGEN SATURATION: 98 % | DIASTOLIC BLOOD PRESSURE: 81 MMHG | SYSTOLIC BLOOD PRESSURE: 116 MMHG | WEIGHT: 168 LBS | TEMPERATURE: 98.9 F | HEIGHT: 71 IN | BODY MASS INDEX: 23.52 KG/M2

## 2022-01-05 LAB
ALBUMIN SERPL ELPH-MCNC: 4.4 G/DL
ALP BLD-CCNC: 91 U/L
ALT SERPL-CCNC: 22 U/L
ANION GAP SERPL CALC-SCNC: 17 MMOL/L
AST SERPL-CCNC: 19 U/L
BASOPHILS # BLD AUTO: 0.1 K/UL
BASOPHILS NFR BLD AUTO: 1 %
BILIRUB SERPL-MCNC: 0.4 MG/DL
BUN SERPL-MCNC: 30 MG/DL
CALCIUM SERPL-MCNC: 10 MG/DL
CHLORIDE SERPL-SCNC: 104 MMOL/L
CO2 SERPL-SCNC: 20 MMOL/L
CREAT SERPL-MCNC: 1.8 MG/DL
EOSINOPHIL # BLD AUTO: 0.35 K/UL
EOSINOPHIL NFR BLD AUTO: 3.7 %
GLUCOSE SERPL-MCNC: 91 MG/DL
HCT VFR BLD CALC: 40.3 %
HGB BLD-MCNC: 12.5 G/DL
IMM GRANULOCYTES NFR BLD AUTO: 0.8 %
LYMPHOCYTES # BLD AUTO: 1.09 K/UL
LYMPHOCYTES NFR BLD AUTO: 11.4 %
MAN DIFF?: NORMAL
MCHC RBC-ENTMCNC: 26.7 PG
MCHC RBC-ENTMCNC: 31 G/DL
MCV RBC AUTO: 85.9 FL
MONOCYTES # BLD AUTO: 0.92 K/UL
MONOCYTES NFR BLD AUTO: 9.6 %
NEUTROPHILS # BLD AUTO: 7 K/UL
NEUTROPHILS NFR BLD AUTO: 73.5 %
PLATELET # BLD AUTO: 307 K/UL
POTASSIUM SERPL-SCNC: 5 MMOL/L
PROT SERPL-MCNC: 7.6 G/DL
RBC # BLD: 4.69 M/UL
RBC # FLD: 16.7 %
SODIUM SERPL-SCNC: 141 MMOL/L
WBC # FLD AUTO: 9.54 K/UL

## 2022-01-05 PROCEDURE — 99024 POSTOP FOLLOW-UP VISIT: CPT

## 2022-01-19 ENCOUNTER — APPOINTMENT (OUTPATIENT)
Dept: CARDIOTHORACIC SURGERY | Facility: CLINIC | Age: 72
End: 2022-01-19
Payer: MEDICARE

## 2022-01-19 VITALS
WEIGHT: 168 LBS | OXYGEN SATURATION: 99 % | HEIGHT: 71 IN | TEMPERATURE: 97.7 F | HEART RATE: 65 BPM | BODY MASS INDEX: 23.52 KG/M2 | DIASTOLIC BLOOD PRESSURE: 88 MMHG | RESPIRATION RATE: 18 BRPM | SYSTOLIC BLOOD PRESSURE: 139 MMHG

## 2022-01-19 PROCEDURE — 99024 POSTOP FOLLOW-UP VISIT: CPT

## 2022-01-24 ENCOUNTER — TRANSCRIPTION ENCOUNTER (OUTPATIENT)
Age: 72
End: 2022-01-24

## 2022-01-25 LAB
ALBUMIN SERPL ELPH-MCNC: 4.3 G/DL
ALP BLD-CCNC: 82 U/L
ALT SERPL-CCNC: 8 U/L
ANION GAP SERPL CALC-SCNC: 17 MMOL/L
AST SERPL-CCNC: 13 U/L
BASOPHILS # BLD AUTO: 0.07 K/UL
BASOPHILS NFR BLD AUTO: 0.9 %
BILIRUB SERPL-MCNC: 0.4 MG/DL
BUN SERPL-MCNC: 26 MG/DL
CALCIUM SERPL-MCNC: 9.9 MG/DL
CHLORIDE SERPL-SCNC: 106 MMOL/L
CHOLEST SERPL-MCNC: 161 MG/DL
CO2 SERPL-SCNC: 21 MMOL/L
CREAT SERPL-MCNC: 1.6 MG/DL
EOSINOPHIL # BLD AUTO: 0.18 K/UL
EOSINOPHIL NFR BLD AUTO: 2.2 %
ESTIMATED AVERAGE GLUCOSE: 120 MG/DL
GLUCOSE SERPL-MCNC: 95 MG/DL
HBA1C MFR BLD HPLC: 5.8 %
HCT VFR BLD CALC: 41.9 %
HDLC SERPL-MCNC: 39 MG/DL
HGB BLD-MCNC: 12.5 G/DL
IMM GRANULOCYTES NFR BLD AUTO: 0.5 %
LDLC SERPL CALC-MCNC: 111 MG/DL
LYMPHOCYTES # BLD AUTO: 1.45 K/UL
LYMPHOCYTES NFR BLD AUTO: 17.7 %
MAN DIFF?: NORMAL
MCHC RBC-ENTMCNC: 25.5 PG
MCHC RBC-ENTMCNC: 29.8 G/DL
MCV RBC AUTO: 85.5 FL
MONOCYTES # BLD AUTO: 0.91 K/UL
MONOCYTES NFR BLD AUTO: 11.1 %
NEUTROPHILS # BLD AUTO: 5.55 K/UL
NEUTROPHILS NFR BLD AUTO: 67.6 %
NONHDLC SERPL-MCNC: 122 MG/DL
PLATELET # BLD AUTO: 211 K/UL
POTASSIUM SERPL-SCNC: 5.3 MMOL/L
PROT SERPL-MCNC: 7.1 G/DL
RBC # BLD: 4.9 M/UL
RBC # FLD: 17.2 %
SODIUM SERPL-SCNC: 144 MMOL/L
TRIGL SERPL-MCNC: 86 MG/DL
WBC # FLD AUTO: 8.2 K/UL

## 2022-01-25 NOTE — ED PROVIDER NOTE - DOMESTIC TRAVEL HIGH RISK QUESTION
1/25/2022; 9830 -   CM contacted [facility] Yves Wilfred: [phone]) and left a HIPAA compliant VM requesting a return call re: patient's potential placement at [facility]. CRM: Kimmy Cantu MPH, CHES; Z: [phone]    10:10 -   CM received return call from [facility] indicating that the facility is anticipated to have discharges today, 1/25 and would be able to accept the patient after 1400. Patient has been accepted to [facility], pending PT and OT notes with final review of medical director. Sundar Alejandro to monitor for pending notes to submit patient's case to the medical director. Sundar Alejandro to return call with EMTALA information. CM contacted [address] ([phone]) who indicated that they do not have transports available today, 1/25. CM contacted [employer] [place]: [phone]). Patient's transport is confirmed for 1500. Packet will be at front nurses' station. CRM: Kimmy Cantu MPH, CHES; Z: [phone]    10:30 -   CM met with patient and son at bedside. PT and OT are present to work with patient. Patient is in agreement to IPR plan at [facility]. PT cleared patient for private vehicle transport. Patient's son to provide discharge transport at around 1300. CM canceled [facility]. Nursing is aware of the above. CRM: Kimmy Cantu MPH, CHES; Z: [phone]    10:55 -   CM received call from [facility] requesting for an updated med list for the patient. CM identified the above to ED Attending. Facility is aware of family's request to transport patient and requeste a transport time of 1400. CRM: Kimmy Cantu MPH, CHES; Z: [phone]    13:00 -   EMTALA information includes:    Accepting Provider: Darleene Goodpasture, DO. Report: [phone]  Room: 208. Liaison: [facility]  [phone]    CM initiated EMTALA. Nursing aware of the above. Nursing to complete EMTALA. CM provided Face Sheet, ED Summary, and ED Attending note.       CRM: Courtney Man Aileen, MPH,  Alberto Luu; Z: [phone] No

## 2022-04-06 ENCOUNTER — RX RENEWAL (OUTPATIENT)
Age: 72
End: 2022-04-06

## 2022-10-07 ENCOUNTER — APPOINTMENT (OUTPATIENT)
Dept: CARDIOLOGY | Facility: CLINIC | Age: 72
End: 2022-10-07
Payer: MEDICARE

## 2022-10-07 VITALS — BODY MASS INDEX: 23.56 KG/M2 | HEIGHT: 71 IN | WEIGHT: 168.25 LBS

## 2022-10-07 DIAGNOSIS — Z82.49 FAMILY HISTORY OF ISCHEMIC HEART DISEASE AND OTHER DISEASES OF THE CIRCULATORY SYSTEM: ICD-10-CM

## 2022-10-07 PROCEDURE — 99215 OFFICE O/P EST HI 40 MIN: CPT

## 2022-10-07 PROCEDURE — 99205 OFFICE O/P NEW HI 60 MIN: CPT

## 2022-10-07 RX ORDER — ASPIRIN 325 MG/1
325 TABLET, FILM COATED ORAL
Refills: 0 | Status: DISCONTINUED | COMMUNITY
Start: 2021-12-27 | End: 2022-10-07

## 2022-10-07 RX ORDER — METOPROLOL TARTRATE 25 MG/1
25 TABLET, FILM COATED ORAL
Refills: 0 | Status: DISCONTINUED | COMMUNITY
Start: 2021-12-27 | End: 2022-10-07

## 2022-10-07 RX ORDER — FUROSEMIDE 20 MG/1
20 TABLET ORAL DAILY
Qty: 30 | Refills: 0 | Status: DISCONTINUED | COMMUNITY
Start: 2021-12-29 | End: 2022-10-07

## 2022-10-10 ENCOUNTER — NON-APPOINTMENT (OUTPATIENT)
Age: 72
End: 2022-10-10

## 2022-11-08 ENCOUNTER — APPOINTMENT (OUTPATIENT)
Dept: CARDIOLOGY | Facility: CLINIC | Age: 72
End: 2022-11-08

## 2022-11-08 PROCEDURE — 93880 EXTRACRANIAL BILAT STUDY: CPT

## 2022-11-08 PROCEDURE — 93978 VASCULAR STUDY: CPT

## 2022-11-21 ENCOUNTER — APPOINTMENT (OUTPATIENT)
Dept: CARDIOLOGY | Facility: CLINIC | Age: 72
End: 2022-11-21

## 2023-01-24 LAB
ALBUMIN SERPL ELPH-MCNC: 4.2 G/DL
ALP BLD-CCNC: 61 U/L
ALT SERPL-CCNC: 10 U/L
ANION GAP SERPL CALC-SCNC: 10 MMOL/L
AST SERPL-CCNC: 14 U/L
BASOPHILS # BLD AUTO: 0.06 K/UL
BASOPHILS NFR BLD AUTO: 0.8 %
BILIRUB SERPL-MCNC: 0.3 MG/DL
BUN SERPL-MCNC: 19 MG/DL
CALCIUM SERPL-MCNC: 9.6 MG/DL
CHLORIDE SERPL-SCNC: 108 MMOL/L
CHOLEST SERPL-MCNC: 132 MG/DL
CO2 SERPL-SCNC: 24 MMOL/L
CREAT SERPL-MCNC: 1.2 MG/DL
EGFR: 64 ML/MIN/1.73M2
EOSINOPHIL # BLD AUTO: 0.11 K/UL
EOSINOPHIL NFR BLD AUTO: 1.5 %
ESTIMATED AVERAGE GLUCOSE: 126 MG/DL
GLUCOSE SERPL-MCNC: 104 MG/DL
HBA1C MFR BLD HPLC: 6 %
HCT VFR BLD CALC: 48.3 %
HDLC SERPL-MCNC: 39 MG/DL
HGB BLD-MCNC: 14.9 G/DL
IMM GRANULOCYTES NFR BLD AUTO: 0.6 %
LDLC SERPL CALC-MCNC: 51 MG/DL
LYMPHOCYTES # BLD AUTO: 1.47 K/UL
LYMPHOCYTES NFR BLD AUTO: 20.5 %
MAN DIFF?: NORMAL
MCHC RBC-ENTMCNC: 25.7 PG
MCHC RBC-ENTMCNC: 30.8 G/DL
MCV RBC AUTO: 83.4 FL
MONOCYTES # BLD AUTO: 0.77 K/UL
MONOCYTES NFR BLD AUTO: 10.8 %
NEUTROPHILS # BLD AUTO: 4.71 K/UL
NEUTROPHILS NFR BLD AUTO: 65.8 %
NONHDLC SERPL-MCNC: 93 MG/DL
NT-PROBNP SERPL-MCNC: 335 PG/ML
PLATELET # BLD AUTO: 171 K/UL
POTASSIUM SERPL-SCNC: 4.4 MMOL/L
PROT SERPL-MCNC: 6.8 G/DL
RBC # BLD: 5.79 M/UL
RBC # FLD: 19.3 %
SODIUM SERPL-SCNC: 142 MMOL/L
TRIGL SERPL-MCNC: 209 MG/DL
TSH SERPL-ACNC: 3.19 UIU/ML
WBC # FLD AUTO: 7.16 K/UL

## 2023-02-07 ENCOUNTER — APPOINTMENT (OUTPATIENT)
Dept: CARDIOLOGY | Facility: CLINIC | Age: 73
End: 2023-02-07
Payer: MEDICARE

## 2023-02-07 VITALS — BODY MASS INDEX: 23.57 KG/M2 | WEIGHT: 169 LBS

## 2023-02-07 DIAGNOSIS — I25.89 OTHER FORMS OF CHRONIC ISCHEMIC HEART DISEASE: ICD-10-CM

## 2023-02-07 DIAGNOSIS — R94.31 ABNORMAL ELECTROCARDIOGRAM [ECG] [EKG]: ICD-10-CM

## 2023-02-07 PROCEDURE — 99214 OFFICE O/P EST MOD 30 MIN: CPT

## 2023-02-07 RX ORDER — CLOPIDOGREL BISULFATE 75 MG/1
75 TABLET, FILM COATED ORAL
Refills: 0 | Status: DISCONTINUED | COMMUNITY
End: 2023-02-07

## 2023-02-07 NOTE — DISCUSSION/SUMMARY
[FreeTextEntry1] : PT NYHA CLASS 2 \par continue meds \par d/w pt at lengthl\par former smoker \par get carotid get aaa screen \par get bloodwork \par use lasix as needed

## 2023-02-07 NOTE — DISCUSSION/SUMMARY
[FreeTextEntry1] : PT NYHA CLASS 2 \par continue meds \par d/w pt at length\par former smoker \par get carotid get aaa screen \par get bloodwork \par use lasix as needed\par \par 2/7/23: stop plavix, stop met tartrate, start succinate 50 \par start ezetimbe to get ldl to 55 \par bloodwork \par get echo use lasix 40 as needed for sob.

## 2023-02-07 NOTE — PHYSICAL EXAM
[Normal S1, S2] : normal S1, S2 [Clear Lung Fields] : clear lung fields [de-identified] : RRR  [de-identified] : R

## 2023-02-07 NOTE — CARDIOLOGY SUMMARY
[de-identified] : 11/22: carotid: b/l plaque, less than 50% b/l ICA. \par 11/22: NO evidence AAA, mild atherosclerosis DA

## 2023-02-07 NOTE — HISTORY OF PRESENT ILLNESS
[FreeTextEntry1] : PT WITH CAD S/P STENTS X3 10/2006, 2 MORE 12/2006, CABG X 3 [facility] 12/21 AFTER AICD D/C, ICM WITH LVEF 25%. htn, emphysema, old MI< sob, family h/o \par \par pt says back golfing 18 holes and walking at times, drives around the course, pt is FC 1 to 2 at this time. \par no pnd, no orthopnea. \par pt not on diuretics. \par \par PT GET TRANSFUSIONS EVERY 2 WEEKS ? POLYCYTHEMIA VERA

## 2023-02-07 NOTE — CARDIOLOGY SUMMARY
[de-identified] : 11/22: carotid: b/l plaque, less than 50% b/l ICA. \par 11/22: NO evidence AAA, mild atherosclerosis DA

## 2023-03-02 ENCOUNTER — APPOINTMENT (OUTPATIENT)
Dept: ELECTROPHYSIOLOGY | Facility: CLINIC | Age: 73
End: 2023-03-02
Payer: MEDICARE

## 2023-03-02 VITALS
RESPIRATION RATE: 18 BRPM | TEMPERATURE: 97 F | DIASTOLIC BLOOD PRESSURE: 84 MMHG | SYSTOLIC BLOOD PRESSURE: 128 MMHG | HEIGHT: 71 IN | WEIGHT: 167 LBS | BODY MASS INDEX: 23.38 KG/M2

## 2023-03-02 VITALS — HEART RATE: 79 BPM

## 2023-03-02 PROCEDURE — 93000 ELECTROCARDIOGRAM COMPLETE: CPT | Mod: 59

## 2023-03-02 PROCEDURE — 99215 OFFICE O/P EST HI 40 MIN: CPT | Mod: 25

## 2023-03-02 PROCEDURE — 93290 INTERROG DEV EVAL ICPMS IP: CPT | Mod: 26

## 2023-03-02 PROCEDURE — 93283 PRGRMG EVAL IMPLANTABLE DFB: CPT

## 2023-03-02 RX ORDER — ACETAMINOPHEN 500 MG
1000 TABLET ORAL
Refills: 0 | Status: COMPLETED | COMMUNITY
End: 2023-03-02

## 2023-03-02 RX ORDER — FAMOTIDINE 20 MG/1
20 TABLET, FILM COATED ORAL
Refills: 0 | Status: COMPLETED | COMMUNITY
Start: 2021-12-27 | End: 2023-03-02

## 2023-03-02 RX ORDER — ALBUTEROL SULFATE 90 UG/1
108 (90 BASE) AEROSOL, METERED RESPIRATORY (INHALATION)
Refills: 0 | Status: COMPLETED | COMMUNITY
End: 2023-03-02

## 2023-03-02 RX ORDER — SENNOSIDES 8.6 MG/1
8.6 TABLET ORAL
Refills: 0 | Status: COMPLETED | COMMUNITY
Start: 2021-12-27 | End: 2023-03-02

## 2023-03-13 NOTE — PROCEDURE
[No] : not [NSR] : normal sinus rhythm [ICD] : Implantable cardioverter-defibrillator [Longevity: ___ months] : The estimated remaining battery life is [unfilled] months [Lead Imp:  ___ohms] : lead impedance was [unfilled] ohms [Sensing Amplitude ___mv] : sensing amplitude was [unfilled] mv [___V @] : [unfilled] V [___ ms] : [unfilled] ms [None] : none [Asense-Vsense ___ %] : Asense-Vsense [unfilled]% [Asense-Vpace ___ %] : Asense-Vpace [unfilled]% [Apace-Vsense ___ %] : Apace-Vsense [unfilled]% [Apace-Vpace ___ %] : Apace-Vpace [unfilled]% [de-identified] : Reese MRI XT  [de-identified] : [id number] [de-identified] : [id number] [de-identified] : 12/21/21 [de-identified] : AAIR/DDDR [de-identified] :  [de-identified] : Many NSVT episodes, all appear to be SVT. Most recent on 2/28/23 for 4 seconds.

## 2023-03-13 NOTE — ADDENDUM
[FreeTextEntry1] : IChika assisted in documentation on 03/13/2023 acting as a scribe for Dr. Calvin Laguerre.\par

## 2023-03-13 NOTE — ASSESSMENT
[FreeTextEntry1] : ## Ischemic cardiomyopathy \par # VT s/p DC-ICD (MDT, 21, non-dep)\par \par - ICD interrogation shows normally functioning DC-ICD. Battery life ok. Optivol below threshold. No new events.\par - GDMT as per cardiology. \par - Remote monitoring transfer. \par - Return in 6 months.

## 2023-03-13 NOTE — HISTORY OF PRESENT ILLNESS
[de-identified] : Mr. Jeronimo is a 72 year-old male with hx of CAD,  PCI, MI s/p CABG, ischemic cardiomyopathy s/p ICD, HTN, dyslipidemia, COPD, VT s/p ICD shock, is here to establish care. \par \par Was followed by Dr. Padron\par \par Denies chest pain, shortness of breath, palpitation, dizziness or LOC except noted above.\par \par EKG (03/02/2023): SR \par Cardio: Dr. Elizalde

## 2023-04-14 ENCOUNTER — APPOINTMENT (OUTPATIENT)
Dept: OTOLARYNGOLOGY | Facility: CLINIC | Age: 73
End: 2023-04-14

## 2023-04-24 ENCOUNTER — INPATIENT (INPATIENT)
Facility: HOSPITAL | Age: 73
LOS: 1 days | Discharge: HOME HEALTH | DRG: 85 | End: 2023-04-26
Attending: SURGERY | Admitting: SURGERY
Payer: MEDICARE

## 2023-04-24 VITALS
HEIGHT: 71 IN | TEMPERATURE: 98 F | RESPIRATION RATE: 18 BRPM | DIASTOLIC BLOOD PRESSURE: 94 MMHG | OXYGEN SATURATION: 99 % | HEART RATE: 82 BPM | WEIGHT: 169.98 LBS | SYSTOLIC BLOOD PRESSURE: 135 MMHG

## 2023-04-24 DIAGNOSIS — I25.10 ATHEROSCLEROTIC HEART DISEASE OF NATIVE CORONARY ARTERY WITHOUT ANGINA PECTORIS: Chronic | ICD-10-CM

## 2023-04-24 DIAGNOSIS — Z95.5 PRESENCE OF CORONARY ANGIOPLASTY IMPLANT AND GRAFT: Chronic | ICD-10-CM

## 2023-04-24 DIAGNOSIS — R55 SYNCOPE AND COLLAPSE: ICD-10-CM

## 2023-04-24 LAB
ALBUMIN SERPL ELPH-MCNC: 4.2 G/DL — SIGNIFICANT CHANGE UP (ref 3.5–5.2)
ALP SERPL-CCNC: 63 U/L — SIGNIFICANT CHANGE UP (ref 30–115)
ALT FLD-CCNC: 15 U/L — SIGNIFICANT CHANGE UP (ref 0–41)
ANION GAP SERPL CALC-SCNC: 10 MMOL/L — SIGNIFICANT CHANGE UP (ref 7–14)
ANION GAP SERPL CALC-SCNC: 12 MMOL/L — SIGNIFICANT CHANGE UP (ref 7–14)
APTT BLD: 34.1 SEC — SIGNIFICANT CHANGE UP (ref 27–39.2)
AST SERPL-CCNC: 23 U/L — SIGNIFICANT CHANGE UP (ref 0–41)
BASOPHILS # BLD AUTO: 0.02 K/UL — SIGNIFICANT CHANGE UP (ref 0–0.2)
BASOPHILS # BLD AUTO: 0.05 K/UL — SIGNIFICANT CHANGE UP (ref 0–0.2)
BASOPHILS NFR BLD AUTO: 0.2 % — SIGNIFICANT CHANGE UP (ref 0–1)
BASOPHILS NFR BLD AUTO: 0.5 % — SIGNIFICANT CHANGE UP (ref 0–1)
BILIRUB SERPL-MCNC: 0.3 MG/DL — SIGNIFICANT CHANGE UP (ref 0.2–1.2)
BLD GP AB SCN SERPL QL: SIGNIFICANT CHANGE UP
BUN SERPL-MCNC: 29 MG/DL — HIGH (ref 10–20)
BUN SERPL-MCNC: 34 MG/DL — HIGH (ref 10–20)
CALCIUM SERPL-MCNC: 9.5 MG/DL — SIGNIFICANT CHANGE UP (ref 8.4–10.5)
CALCIUM SERPL-MCNC: 9.6 MG/DL — SIGNIFICANT CHANGE UP (ref 8.4–10.5)
CHLORIDE SERPL-SCNC: 106 MMOL/L — SIGNIFICANT CHANGE UP (ref 98–110)
CHLORIDE SERPL-SCNC: 108 MMOL/L — SIGNIFICANT CHANGE UP (ref 98–110)
CK MB CFR SERPL CALC: 8.7 NG/ML — HIGH (ref 0.6–6.3)
CK MB CFR SERPL CALC: 8.7 NG/ML — HIGH (ref 0.6–6.3)
CK SERPL-CCNC: 220 U/L — SIGNIFICANT CHANGE UP (ref 0–225)
CK SERPL-CCNC: 227 U/L — HIGH (ref 0–225)
CO2 SERPL-SCNC: 18 MMOL/L — SIGNIFICANT CHANGE UP (ref 17–32)
CO2 SERPL-SCNC: 26 MMOL/L — SIGNIFICANT CHANGE UP (ref 17–32)
CREAT SERPL-MCNC: 1.4 MG/DL — SIGNIFICANT CHANGE UP (ref 0.7–1.5)
CREAT SERPL-MCNC: 1.7 MG/DL — HIGH (ref 0.7–1.5)
EGFR: 42 ML/MIN/1.73M2 — LOW
EGFR: 53 ML/MIN/1.73M2 — LOW
EOSINOPHIL # BLD AUTO: 0.03 K/UL — SIGNIFICANT CHANGE UP (ref 0–0.7)
EOSINOPHIL # BLD AUTO: 0.04 K/UL — SIGNIFICANT CHANGE UP (ref 0–0.7)
EOSINOPHIL NFR BLD AUTO: 0.3 % — SIGNIFICANT CHANGE UP (ref 0–8)
EOSINOPHIL NFR BLD AUTO: 0.4 % — SIGNIFICANT CHANGE UP (ref 0–8)
GLUCOSE SERPL-MCNC: 66 MG/DL — LOW (ref 70–99)
GLUCOSE SERPL-MCNC: 89 MG/DL — SIGNIFICANT CHANGE UP (ref 70–99)
HCT VFR BLD CALC: 45.2 % — SIGNIFICANT CHANGE UP (ref 42–52)
HCT VFR BLD CALC: 48.2 % — SIGNIFICANT CHANGE UP (ref 42–52)
HGB BLD-MCNC: 14.3 G/DL — SIGNIFICANT CHANGE UP (ref 14–18)
HGB BLD-MCNC: 15.2 G/DL — SIGNIFICANT CHANGE UP (ref 14–18)
IMM GRANULOCYTES NFR BLD AUTO: 0.4 % — HIGH (ref 0.1–0.3)
IMM GRANULOCYTES NFR BLD AUTO: 0.5 % — HIGH (ref 0.1–0.3)
INR BLD: 1.19 RATIO — SIGNIFICANT CHANGE UP (ref 0.65–1.3)
LYMPHOCYTES # BLD AUTO: 0.87 K/UL — LOW (ref 1.2–3.4)
LYMPHOCYTES # BLD AUTO: 1.1 K/UL — LOW (ref 1.2–3.4)
LYMPHOCYTES # BLD AUTO: 11.1 % — LOW (ref 20.5–51.1)
LYMPHOCYTES # BLD AUTO: 9 % — LOW (ref 20.5–51.1)
MAGNESIUM SERPL-MCNC: 2.1 MG/DL — SIGNIFICANT CHANGE UP (ref 1.8–2.4)
MCHC RBC-ENTMCNC: 26.1 PG — LOW (ref 27–31)
MCHC RBC-ENTMCNC: 26.4 PG — LOW (ref 27–31)
MCHC RBC-ENTMCNC: 31.5 G/DL — LOW (ref 32–37)
MCHC RBC-ENTMCNC: 31.6 G/DL — LOW (ref 32–37)
MCV RBC AUTO: 82.8 FL — SIGNIFICANT CHANGE UP (ref 80–94)
MCV RBC AUTO: 83.4 FL — SIGNIFICANT CHANGE UP (ref 80–94)
MONOCYTES # BLD AUTO: 0.75 K/UL — HIGH (ref 0.1–0.6)
MONOCYTES # BLD AUTO: 0.81 K/UL — HIGH (ref 0.1–0.6)
MONOCYTES NFR BLD AUTO: 7.7 % — SIGNIFICANT CHANGE UP (ref 1.7–9.3)
MONOCYTES NFR BLD AUTO: 8.1 % — SIGNIFICANT CHANGE UP (ref 1.7–9.3)
NEUTROPHILS # BLD AUTO: 7.94 K/UL — HIGH (ref 1.4–6.5)
NEUTROPHILS # BLD AUTO: 7.95 K/UL — HIGH (ref 1.4–6.5)
NEUTROPHILS NFR BLD AUTO: 79.9 % — HIGH (ref 42.2–75.2)
NEUTROPHILS NFR BLD AUTO: 81.9 % — HIGH (ref 42.2–75.2)
NRBC # BLD: 0 /100 WBCS — SIGNIFICANT CHANGE UP (ref 0–0)
NRBC # BLD: 0 /100 WBCS — SIGNIFICANT CHANGE UP (ref 0–0)
NT-PROBNP SERPL-SCNC: 615 PG/ML — HIGH (ref 0–300)
PHOSPHATE SERPL-MCNC: 3.1 MG/DL — SIGNIFICANT CHANGE UP (ref 2.1–4.9)
PLATELET # BLD AUTO: 122 K/UL — LOW (ref 130–400)
PLATELET # BLD AUTO: 128 K/UL — LOW (ref 130–400)
PMV BLD: 11 FL — HIGH (ref 7.4–10.4)
PMV BLD: 11.3 FL — HIGH (ref 7.4–10.4)
POTASSIUM SERPL-MCNC: 4 MMOL/L — SIGNIFICANT CHANGE UP (ref 3.5–5)
POTASSIUM SERPL-MCNC: 4.9 MMOL/L — SIGNIFICANT CHANGE UP (ref 3.5–5)
POTASSIUM SERPL-SCNC: 4 MMOL/L — SIGNIFICANT CHANGE UP (ref 3.5–5)
POTASSIUM SERPL-SCNC: 4.9 MMOL/L — SIGNIFICANT CHANGE UP (ref 3.5–5)
PROT SERPL-MCNC: 7.1 G/DL — SIGNIFICANT CHANGE UP (ref 6–8)
PROTHROM AB SERPL-ACNC: 13.6 SEC — HIGH (ref 9.95–12.87)
RBC # BLD: 5.42 M/UL — SIGNIFICANT CHANGE UP (ref 4.7–6.1)
RBC # BLD: 5.82 M/UL — SIGNIFICANT CHANGE UP (ref 4.7–6.1)
RBC # FLD: 17.9 % — HIGH (ref 11.5–14.5)
RBC # FLD: 18.4 % — HIGH (ref 11.5–14.5)
SODIUM SERPL-SCNC: 138 MMOL/L — SIGNIFICANT CHANGE UP (ref 135–146)
SODIUM SERPL-SCNC: 142 MMOL/L — SIGNIFICANT CHANGE UP (ref 135–146)
TROPONIN T SERPL-MCNC: <0.01 NG/ML — SIGNIFICANT CHANGE UP
TROPONIN T SERPL-MCNC: <0.01 NG/ML — SIGNIFICANT CHANGE UP
WBC # BLD: 9.7 K/UL — SIGNIFICANT CHANGE UP (ref 4.8–10.8)
WBC # BLD: 9.95 K/UL — SIGNIFICANT CHANGE UP (ref 4.8–10.8)
WBC # FLD AUTO: 9.7 K/UL — SIGNIFICANT CHANGE UP (ref 4.8–10.8)
WBC # FLD AUTO: 9.95 K/UL — SIGNIFICANT CHANGE UP (ref 4.8–10.8)

## 2023-04-24 PROCEDURE — 84133 ASSAY OF URINE POTASSIUM: CPT

## 2023-04-24 PROCEDURE — 84480 ASSAY TRIIODOTHYRONINE (T3): CPT

## 2023-04-24 PROCEDURE — 70450 CT HEAD/BRAIN W/O DYE: CPT | Mod: 26,MA,77

## 2023-04-24 PROCEDURE — 99223 1ST HOSP IP/OBS HIGH 75: CPT

## 2023-04-24 PROCEDURE — 81003 URINALYSIS AUTO W/O SCOPE: CPT

## 2023-04-24 PROCEDURE — 97161 PT EVAL LOW COMPLEX 20 MIN: CPT | Mod: GP

## 2023-04-24 PROCEDURE — 71250 CT THORAX DX C-: CPT | Mod: MA

## 2023-04-24 PROCEDURE — 85025 COMPLETE CBC W/AUTO DIFF WBC: CPT

## 2023-04-24 PROCEDURE — 93010 ELECTROCARDIOGRAM REPORT: CPT | Mod: 77

## 2023-04-24 PROCEDURE — 72125 CT NECK SPINE W/O DYE: CPT | Mod: 26,MA

## 2023-04-24 PROCEDURE — 83735 ASSAY OF MAGNESIUM: CPT

## 2023-04-24 PROCEDURE — 80048 BASIC METABOLIC PNL TOTAL CA: CPT

## 2023-04-24 PROCEDURE — 82550 ASSAY OF CK (CPK): CPT

## 2023-04-24 PROCEDURE — 84100 ASSAY OF PHOSPHORUS: CPT

## 2023-04-24 PROCEDURE — 82553 CREATINE MB FRACTION: CPT

## 2023-04-24 PROCEDURE — 70450 CT HEAD/BRAIN W/O DYE: CPT

## 2023-04-24 PROCEDURE — 84484 ASSAY OF TROPONIN QUANT: CPT

## 2023-04-24 PROCEDURE — 84436 ASSAY OF TOTAL THYROXINE: CPT

## 2023-04-24 PROCEDURE — 99291 CRITICAL CARE FIRST HOUR: CPT | Mod: 24,25

## 2023-04-24 PROCEDURE — 85027 COMPLETE CBC AUTOMATED: CPT

## 2023-04-24 PROCEDURE — 72170 X-RAY EXAM OF PELVIS: CPT | Mod: 26

## 2023-04-24 PROCEDURE — 83880 ASSAY OF NATRIURETIC PEPTIDE: CPT

## 2023-04-24 PROCEDURE — 71250 CT THORAX DX C-: CPT | Mod: 26

## 2023-04-24 PROCEDURE — 74176 CT ABD & PELVIS W/O CONTRAST: CPT | Mod: 26

## 2023-04-24 PROCEDURE — 74176 CT ABD & PELVIS W/O CONTRAST: CPT | Mod: MA

## 2023-04-24 PROCEDURE — 71045 X-RAY EXAM CHEST 1 VIEW: CPT | Mod: 26

## 2023-04-24 PROCEDURE — 99285 EMERGENCY DEPT VISIT HI MDM: CPT

## 2023-04-24 PROCEDURE — 72170 X-RAY EXAM OF PELVIS: CPT

## 2023-04-24 PROCEDURE — 93010 ELECTROCARDIOGRAM REPORT: CPT | Mod: 76

## 2023-04-24 PROCEDURE — 70450 CT HEAD/BRAIN W/O DYE: CPT | Mod: 26

## 2023-04-24 PROCEDURE — 84443 ASSAY THYROID STIM HORMONE: CPT

## 2023-04-24 PROCEDURE — 99222 1ST HOSP IP/OBS MODERATE 55: CPT

## 2023-04-24 PROCEDURE — 94760 N-INVAS EAR/PLS OXIMETRY 1: CPT

## 2023-04-24 PROCEDURE — 82570 ASSAY OF URINE CREATININE: CPT

## 2023-04-24 PROCEDURE — 83935 ASSAY OF URINE OSMOLALITY: CPT

## 2023-04-24 PROCEDURE — 97166 OT EVAL MOD COMPLEX 45 MIN: CPT | Mod: GO

## 2023-04-24 PROCEDURE — 71045 X-RAY EXAM CHEST 1 VIEW: CPT

## 2023-04-24 PROCEDURE — 36415 COLL VENOUS BLD VENIPUNCTURE: CPT

## 2023-04-24 PROCEDURE — 93306 TTE W/DOPPLER COMPLETE: CPT

## 2023-04-24 PROCEDURE — 80076 HEPATIC FUNCTION PANEL: CPT

## 2023-04-24 PROCEDURE — 84300 ASSAY OF URINE SODIUM: CPT

## 2023-04-24 PROCEDURE — 84540 ASSAY OF URINE/UREA-N: CPT

## 2023-04-24 PROCEDURE — 93005 ELECTROCARDIOGRAM TRACING: CPT

## 2023-04-24 RX ORDER — ACETAMINOPHEN 500 MG
975 TABLET ORAL ONCE
Refills: 0 | Status: COMPLETED | OUTPATIENT
Start: 2023-04-24 | End: 2023-04-24

## 2023-04-24 RX ORDER — AMLODIPINE BESYLATE 2.5 MG/1
5 TABLET ORAL DAILY
Refills: 0 | Status: DISCONTINUED | OUTPATIENT
Start: 2023-04-24 | End: 2023-04-24

## 2023-04-24 RX ORDER — SACUBITRIL AND VALSARTAN 24; 26 MG/1; MG/1
1 TABLET, FILM COATED ORAL
Refills: 0 | Status: DISCONTINUED | OUTPATIENT
Start: 2023-04-24 | End: 2023-04-24

## 2023-04-24 RX ORDER — SACUBITRIL AND VALSARTAN 24; 26 MG/1; MG/1
1 TABLET, FILM COATED ORAL
Refills: 0 | DISCHARGE

## 2023-04-24 RX ORDER — SENNA PLUS 8.6 MG/1
2 TABLET ORAL AT BEDTIME
Refills: 0 | Status: COMPLETED | OUTPATIENT
Start: 2023-04-24 | End: 2024-03-22

## 2023-04-24 RX ORDER — ACETAMINOPHEN 500 MG
650 TABLET ORAL EVERY 6 HOURS
Refills: 0 | Status: DISCONTINUED | OUTPATIENT
Start: 2023-04-24 | End: 2023-04-26

## 2023-04-24 RX ORDER — EZETIMIBE 10 MG/1
1 TABLET ORAL
Refills: 0 | DISCHARGE

## 2023-04-24 RX ORDER — AMIODARONE HYDROCHLORIDE 400 MG/1
150 TABLET ORAL ONCE
Refills: 0 | Status: DISCONTINUED | OUTPATIENT
Start: 2023-04-24 | End: 2023-04-26

## 2023-04-24 RX ORDER — SODIUM CHLORIDE 9 MG/ML
1000 INJECTION INTRAMUSCULAR; INTRAVENOUS; SUBCUTANEOUS
Refills: 0 | Status: DISCONTINUED | OUTPATIENT
Start: 2023-04-24 | End: 2023-04-24

## 2023-04-24 RX ORDER — AMIODARONE HYDROCHLORIDE 400 MG/1
1 TABLET ORAL
Qty: 450 | Refills: 0 | Status: DISCONTINUED | OUTPATIENT
Start: 2023-04-24 | End: 2023-04-25

## 2023-04-24 RX ORDER — ONDANSETRON 8 MG/1
4 TABLET, FILM COATED ORAL ONCE
Refills: 0 | Status: COMPLETED | OUTPATIENT
Start: 2023-04-24 | End: 2023-04-24

## 2023-04-24 RX ORDER — PANTOPRAZOLE SODIUM 20 MG/1
40 TABLET, DELAYED RELEASE ORAL
Refills: 0 | Status: DISCONTINUED | OUTPATIENT
Start: 2023-04-24 | End: 2023-04-24

## 2023-04-24 RX ORDER — METOPROLOL TARTRATE 50 MG
50 TABLET ORAL DAILY
Refills: 0 | Status: DISCONTINUED | OUTPATIENT
Start: 2023-04-24 | End: 2023-04-24

## 2023-04-24 RX ORDER — LEVETIRACETAM 250 MG/1
500 TABLET, FILM COATED ORAL
Refills: 0 | Status: DISCONTINUED | OUTPATIENT
Start: 2023-04-24 | End: 2023-04-26

## 2023-04-24 RX ORDER — LEVETIRACETAM 250 MG/1
500 TABLET, FILM COATED ORAL EVERY 12 HOURS
Refills: 0 | Status: DISCONTINUED | OUTPATIENT
Start: 2023-04-24 | End: 2023-04-24

## 2023-04-24 RX ORDER — LEVETIRACETAM 250 MG/1
1000 TABLET, FILM COATED ORAL ONCE
Refills: 0 | Status: COMPLETED | OUTPATIENT
Start: 2023-04-24 | End: 2023-04-24

## 2023-04-24 RX ORDER — SODIUM CHLORIDE 9 MG/ML
1000 INJECTION INTRAMUSCULAR; INTRAVENOUS; SUBCUTANEOUS
Refills: 0 | Status: DISCONTINUED | OUTPATIENT
Start: 2023-04-24 | End: 2023-04-26

## 2023-04-24 RX ORDER — METOPROLOL TARTRATE 50 MG
50 TABLET ORAL DAILY
Refills: 0 | Status: DISCONTINUED | OUTPATIENT
Start: 2023-04-25 | End: 2023-04-26

## 2023-04-24 RX ORDER — POTASSIUM CHLORIDE 20 MEQ
10 PACKET (EA) ORAL DAILY
Refills: 0 | Status: DISCONTINUED | OUTPATIENT
Start: 2023-04-24 | End: 2023-04-24

## 2023-04-24 RX ORDER — FUROSEMIDE 40 MG
40 TABLET ORAL DAILY
Refills: 0 | Status: DISCONTINUED | OUTPATIENT
Start: 2023-04-24 | End: 2023-04-24

## 2023-04-24 RX ORDER — ATORVASTATIN CALCIUM 80 MG/1
40 TABLET, FILM COATED ORAL AT BEDTIME
Refills: 0 | Status: DISCONTINUED | OUTPATIENT
Start: 2023-04-24 | End: 2023-04-26

## 2023-04-24 RX ORDER — METOPROLOL TARTRATE 50 MG
1 TABLET ORAL
Refills: 0 | DISCHARGE

## 2023-04-24 RX ORDER — AMIODARONE HYDROCHLORIDE 400 MG/1
0.5 TABLET ORAL
Qty: 450 | Refills: 0 | Status: DISCONTINUED | OUTPATIENT
Start: 2023-04-25 | End: 2023-04-26

## 2023-04-24 RX ORDER — SODIUM CHLORIDE 9 MG/ML
500 INJECTION INTRAMUSCULAR; INTRAVENOUS; SUBCUTANEOUS ONCE
Refills: 0 | Status: COMPLETED | OUTPATIENT
Start: 2023-04-24 | End: 2023-04-24

## 2023-04-24 RX ORDER — ALBUTEROL 90 UG/1
2 AEROSOL, METERED ORAL EVERY 6 HOURS
Refills: 0 | Status: DISCONTINUED | OUTPATIENT
Start: 2023-04-24 | End: 2023-04-26

## 2023-04-24 RX ADMIN — SODIUM CHLORIDE 70 MILLILITER(S): 9 INJECTION INTRAMUSCULAR; INTRAVENOUS; SUBCUTANEOUS at 12:14

## 2023-04-24 RX ADMIN — ONDANSETRON 4 MILLIGRAM(S): 8 TABLET, FILM COATED ORAL at 09:34

## 2023-04-24 RX ADMIN — Medication 975 MILLIGRAM(S): at 09:34

## 2023-04-24 RX ADMIN — LEVETIRACETAM 400 MILLIGRAM(S): 250 TABLET, FILM COATED ORAL at 12:14

## 2023-04-24 RX ADMIN — ATORVASTATIN CALCIUM 40 MILLIGRAM(S): 80 TABLET, FILM COATED ORAL at 21:52

## 2023-04-24 RX ADMIN — LEVETIRACETAM 500 MILLIGRAM(S): 250 TABLET, FILM COATED ORAL at 18:45

## 2023-04-24 RX ADMIN — SODIUM CHLORIDE 70 MILLILITER(S): 9 INJECTION INTRAMUSCULAR; INTRAVENOUS; SUBCUTANEOUS at 20:22

## 2023-04-24 RX ADMIN — AMIODARONE HYDROCHLORIDE 33.3 MG/MIN: 400 TABLET ORAL at 20:23

## 2023-04-24 RX ADMIN — SODIUM CHLORIDE 500 MILLILITER(S): 9 INJECTION INTRAMUSCULAR; INTRAVENOUS; SUBCUTANEOUS at 16:54

## 2023-04-24 NOTE — CONSULT NOTE ADULT - ASSESSMENT
IMPRESSION: Rehab of traumatic SDH / SAH / concussion with LOC / CAD, s/p CABG / AICD, HTN, COPD    PRECAUTIONS: [   ] Cardiac  [   ] Respiratory  [   ] Seizures [   ] Contact Isolation  [   ] Droplet Isolation  [   ] Other    Weight Bearing Status:     RECOMMENDATION: F/U neurosurgery     Out of Bed to Chair     DVT/Decubiti Prophylaxis    REHAB PLAN:     [  x  ] Bedside P/T 3-5 times a week   [  x  ]   Bedside O/T  2-3 times a week             [    ] Speech Therapy               [    ]  No Rehab Therapy Indicated   Conditioning/ROM                                    ADL  Bed Mobility                                               Conditioning/ROM  Transfers                                                     Bed Mobility  Sitting /Standing Balance                         Transfers                                        Gait Training                                               Sitting/Standing Balance  Stair Training [   ]Applicable                    Home equipment Eval                                                                        Splinting  [   ] Only      GOALS:   ADL   [  x  ]   Independent                    Transfers  [x    ] Independent                          Ambulation  [ x   ] Independent     [   x  ] With device                            [    ]  CG                                                         [    ]  CG                                                                  [    ] CG                            [    ] Min A                                                   [    ] Min A                                                              [    ] Min  A          DISCHARGE PLAN:   [    ]  Good candidate for Intensive Rehabilitation/Hospital based                                             Will tolerate 3hrs Intensive Rehab Daily                                       [     ]  Short Term Rehab in Skilled Nursing Facility                                       [     ]  Home with Outpatient or  services                                         [  x   ]  Possible Candidate for Intensive Hospital based Rehab if pt can't ambulate functional distance

## 2023-04-24 NOTE — ED PROVIDER NOTE - PROGRESS NOTE DETAILS
WILLI: Spoke with radiology - (+) subdural hemorrhage with subarachnoid hemorrhages on CT. Trauma and neurosurgery consulted. WILLI: Spoke with neurosurgery who evaluated patient - recommending keppra 1000mg IVPB, repeat head CT in 6h. No need to reverse plavix currently - patient discontinued it 4 months ago per neurosurgery. Recommend holding ASA, no need for platelets currently. Will await trauma recs. WILLI: Per trauma - recommending admission to Dr. Gerardo but to SICU (approving would be Dr. Nobles). Recommending dry CT of c/a/p, which is now ordered. WILLI: Case discussed with SICU Dr. Nobles - recommending admission to trauma surgical floor in the mean time until repeat head CT to determine SICU vs stepdown. She will notify Dr. Gerardo.

## 2023-04-24 NOTE — CONSULT NOTE ADULT - SUBJECTIVE AND OBJECTIVE BOX
HPI: Patient is a 73 year-old male PMH CAD, HTN, HLD on ASA daily presenting to hospital s/p syncope (+) HT, (+) LOC. Patient claims he was at a golf course in [place] when he passed out. Claims he was running with his gold bag (which he doesn't usually do) and got dizzy and passed out. Claims next thing he remembers is waking up with his friends and EMS surrounding him. Patient claims his friend saw the incident who claims he hit the back of his head when he passed out. Claims this has never happened before. Neurosurgery was consulted after CTH demonstrated acute falcine SDH and scattered SAH. Patient was seen and examined at bedside in ED. He is lying in bed A&Ox3 and following all commands. Patient admits to generalized headache with decreased hearing to his left ear since the event. Otherwise, patient denies dizziness, visual changes (double/blurry vision), weakness, paresthesias, nausea, vomiting at this time.        PAST MEDICAL & SURGICAL HISTORY:  COPD (chronic obstructive pulmonary disease)      CAD (coronary artery disease)      Myocardial infarction  2006      CAD (coronary artery disease)  HAD CARDIAC CATH WITH STENTS 2006      Hypercholesteremia      HTN (hypertension)      CAD (coronary artery disease)  CARDIAC CATH 2006  AICD 2015      History of coronary artery stent placement          Home Medications:  Ventolin HFA 90 mcg/inh inhalation aerosol: 2 puff(s) inhaled 4 times a day, As Needed (10 Feb 2020 17:27)      Allergies    No Known Allergies    Intolerances        ROS:  [X] A ten-point review of systems is negative except as noted   [  ] Due to altered mental status/intubation, subjective information were not able to be obtained from the patient. History was obtained, to the extent possible, from review of the chart and collateral sources of information    MEDICATIONS  (STANDING):  levETIRAcetam  IVPB 1000 milliGRAM(s) IV Intermittent once    MEDICATIONS  (PRN):      ICU Vital Signs Last 24 Hrs  T(C): 36.6 (24 Apr 2023 08:17), Max: 36.6 (24 Apr 2023 08:17)  T(F): 97.8 (24 Apr 2023 08:17), Max: 97.8 (24 Apr 2023 08:17)  HR: 82 (24 Apr 2023 08:17) (82 - 82)  BP: 135/94 (24 Apr 2023 08:17) (135/94 - 135/94)  BP(mean): --  ABP: --  ABP(mean): --  RR: 18 (24 Apr 2023 08:17) (18 - 18)  SpO2: 99% (24 Apr 2023 08:17) (99% - 99%)    O2 Parameters below as of 24 Apr 2023 08:17  Patient On (Oxygen Delivery Method): room air            I&O's Detail      CBC Full  -  ( 24 Apr 2023 09:30 )  WBC Count : 9.70 K/uL  RBC Count : 5.82 M/uL  Hemoglobin : 15.2 g/dL  Hematocrit : 48.2 %  Platelet Count - Automated : 128 K/uL  Mean Cell Volume : 82.8 fL  Mean Cell Hemoglobin : 26.1 pg  Mean Cell Hemoglobin Concentration : 31.5 g/dL  Auto Neutrophil # : 7.94 K/uL  Auto Lymphocyte # : 0.87 K/uL  Auto Monocyte # : 0.75 K/uL  Auto Eosinophil # : 0.04 K/uL  Auto Basophil # : 0.05 K/uL  Auto Neutrophil % : 81.9 %  Auto Lymphocyte % : 9.0 %  Auto Monocyte % : 7.7 %  Auto Eosinophil % : 0.4 %  Auto Basophil % : 0.5 %    04-24    142  |  106  |  34<H>  ----------------------------<  66<L>  4.0   |  26  |  1.7<H>    Ca    9.6      24 Apr 2023 09:30    TPro  7.1  /  Alb  4.2  /  TBili  0.3  /  DBili  x   /  AST  23  /  ALT  15  /  AlkPhos  63  04-24    CARDIAC MARKERS ( 24 Apr 2023 09:30 )  x     / <0.01 ng/mL / x     / x     / x              Physical Exam:  General: Lying in bed, following all commands  AAOX3. Verbal function intact  Tongue midline, facial motions symmetric  PERRL, EOMI  Pronator Drift: Negative  Finger to Nose intact  Motor: MAEx4  5/5 strength in b/l UE's and LE's  Sensation: intact to touch in all extremities      Imaging:  < from: CT Head No Cont (04.24.23 @ 09:48) >  CT HEAD:  Acute subdural hemorrhages along the cerebral falx and along the   bifrontal convexities (right larger than left). No midline shift.    Scattered subarachnoid hemorrhages in the interhemispheric fissure and   bilateral cerebral sulci as above.    Redemonstrated 2 cm planum sphenoidale meningioma with slightly increased   peritumoral edema.    CT CERVICAL SPINE:  No acute cervical fracture or facet subluxation.        Assessment/Plan:  73 year-old male PMH CAD, HTN, HLD on ASA daily presenting to hospital s/p syncope (+) HT, (+) LOC. CTH demonstrating acute SDH and scattered SAH.   -Please repeat CTH non con in 4-6 hours from initial scan  -Keppra  -Patient claims only takes ASA, has not taken Plavix for 4+ months per outpatient cardiologist  -Please hold AP/AC x 2 weeks  -BP management, SBP <150  -HOB elevated  -q 1 hour neuro checks until stability scan  -F/U trauma reccs  -Discussed with attending

## 2023-04-24 NOTE — ED ADULT NURSE NOTE - NSIMPLEMENTINTERV_GEN_ALL_ED
Implemented All Fall Risk Interventions:  [place] to call system. Call bell, personal items and telephone within reach. Instruct patient to call for assistance. Room bathroom lighting operational. Non-slip footwear when patient is off stretcher. Physically safe environment: no spills, clutter or unnecessary equipment. Stretcher in lowest position, wheels locked, appropriate side rails in place. Provide visual cue, wrist band, yellow gown, etc. Monitor gait and stability. Monitor for mental status changes and reorient to person, place, and time. Review medications for side effects contributing to fall risk. Reinforce activity limits and safety measures with patient and family.

## 2023-04-24 NOTE — H&P ADULT - NSHPLABSRESULTS_GEN_ALL_CORE
Labs:                 15.2   9.70  )-----------( 128      ( 24 Apr 2023 09:30 )             48.2       Auto Neutrophil %: 81.9 % (04-24-23 @ 09:30)  Auto Immature Granulocyte %: 0.5 % (04-24-23 @ 09:30)    04-24  142  |  106  |  34<H>  ----------------------------<  66<L>  4.0   |  26  |  1.7<H>  Calcium, Total Serum: 9.6 mg/dL (04-24-23 @ 09:30)    LFTs:         7.1  | 0.3  | 23       ------------------[63      ( 24 Apr 2023 09:30 )  4.2  | x    | 15        Lipase:x      Amylase:x           Coags:   13.60  ----< 1.19    ( 24 Apr 2023 10:50 )     34.1      CARDIAC MARKERS ( 24 Apr 2023 09:30 )  x     / <0.01 ng/mL / x     / x     / x      RADIOLOGY  < from: CT Cervical Spine No Cont (04.24.23 @ 09:52) >    IMPRESSION:    CT HEAD:  Acute subdural hemorrhages along the cerebral falx and along the   bifrontal convexities (right larger than left). No midline shift.    Scattered subarachnoid hemorrhages in the interhemispheric fissure and   bilateral cerebral sulci as above.    Redemonstrated 2 cm planum sphenoidale meningioma with slightly increased   peritumoral edema.    CT CERVICAL SPINE:  No acute cervical fracture or facet subluxation.    Communication: The summary of above findings were discussed with readback   confirmation with Dr. Allred by radiologist Dr. Rico on 4/24/2023 at 10:16   AM.    < end of copied text >

## 2023-04-24 NOTE — CONSULT NOTE ADULT - ASSESSMENT
EP: Dr Laguerre    72 year-old male with hx of CAD, PCI, MI s/p CABG, ischemic cardiomyopathy s/p ICD, HTN, dyslipidemia, COPD, VT s/p ICD shock presenting s/p syncopal fall.     [employer] DC AICD interrogation:  Episode of VT terminated by shock followed by SVT ~200bpm terminated by second shock  Several brief episodes of NSVT  Device functioning properly   0.1%  AP 58.7%  Mode: AAIR<=>DDDR  bpm    Impression:  Syncope  AICD Shock for VT and SVT  CAD sp CABG  ICM EF 30-35%  HTN  HLD  COPD    Plan:  - Start Amiodarone, 150mg bolus f/b 1mg/min x 6 hours then 0.5mg/min x 18 hours  - When Amio drip completed, start PO 200mg BID f/b 200mg daily  - 2D Echo  - Increase BB  - Will plan for EP study prior to discharge  - Cont tele monitoring in CCU  - Monitor electrolytes, maintain WNL  - Will follow     Cards Dr Elizalde  EP: Dr Laguerre    72 year-old male with hx of CAD, PCI, MI s/p CABG, ischemic cardiomyopathy s/p ICD, HTN, dyslipidemia, COPD, VT s/p ICD shock presenting s/p syncopal fall.     [employer] DC AICD interrogation:  Episode of VT terminated by shock followed by SVT ~200bpm terminated by second shock  Several brief episodes of NSVT  Device functioning properly   0.1%  AP 58.7%  Mode: AAIR<=>DDDR  bpm    Impression:  Syncope  AICD Shock for VT and SVT  CAD sp CABG  ICM EF 30-35%  HTN  HLD  COPD    Plan:  - Start Amiodarone, 150mg bolus f/b 1mg/min x 6 hours then 0.5mg/min x 18 hours  - When Amio drip completed, start PO 200mg BID for 2 weeks f/b 200mg daily  - Daily EKG  - 2D Echo  - Increase BB as BP tolerates  - Cont tele monitoring in crit care / step dsown  - Monitor electrolytes, maintain WNL  - SDH/SAH management / repeat CT per Neuro sx  - follow up as out patient with Dr Laguerre in 1 month  - will plan out-patient EP study / ablation once can tolerate therapeutic AC if needed for the procedure

## 2023-04-24 NOTE — ED PROVIDER NOTE - OBJECTIVE STATEMENT
73-year-old male past medical history as documented, not on anticoagulation, presenting status post episode of syncope.  Patient states that he was coming back from the golf course and felt slightly lightheaded, causing him to pass out.  Patient states that he hit his head during the fall.  Patient is unsure how long he was unconscious for but states that he thinks it was a few minutes.  No known urinary incontinence or confusion after the episode.  No history of syncope in the past.  Complaining of isolated posterior head and neck pain but no other pain elsewhere.  Otherwise denies fevers, chest pain, dyspnea, abdominal pain, nausea/vomiting, extremity pain or any other complaints.

## 2023-04-24 NOTE — H&P ADULT - ASSESSMENT
Assessment  73M PMH of CHF (EF 30-35% in 12/2022) with AICD ([employer] MRI XT DR), CAD s/p PCI (x5 2006) s/p CABG 12/2022, HTN, COPD (no home O2) presenting s/p syncopal fall +HT, +LOC, -AC (on ASA 81mg). GCS 15, A&Ox3, no external signs of trauma. No focal neurologic deficits on physical examination. On workup found to have b/l Frontal SDH, SAH along falx with no ML shift and what appears to be a meningioma    Plan  #SDH/SAH, Meningioma  - Neurosurgery consult appreciated       - Keppra (1g load, 500 BID)      - No need for CTA      - q1 neurochecks      - rpt CTH 4PM  - hold ASA and DVT PPX  - PPI  - BP < 150    #Syncopal fall  - 24H telemetry workup  - trend troponin given significant cardiac history  - f/u BNP  - echocardiogram  - orthostatic BP measurement  - f/u Cardiology consult: Dr. Elizalde  - f/u completion CT C/A/P    #DO (Cr 1.7 < 0.9)  - f/u CK levels  - c/w gentle fluid rehydration  - f/u Ulytes  - holding sacubtril given DO    d/w Dr. Gerardo   Assessment  73M PMH of CHF (EF 30-35% in 12/2022) with AICD ([employer] MRI XT DR), CAD s/p PCI (x5 2006) s/p CABG 12/2022, HTN, COPD (no home O2) presenting s/p syncopal fall +HT, +LOC, -AC (on ASA 81mg). GCS 15, A&Ox3, no external signs of trauma. No focal neurologic deficits on physical examination. On workup found to have b/l Frontal SDH, SAH along falx with no ML shift and what appears to be a meningioma    Plan  #SDH/SAH, Meningioma  - Neurosurgery consult appreciated       - Keppra (1g load, 500 BID)      - No need for CTA      - q1 neurochecks      - rpt CTH 4PM  - hold ASA and DVT PPX  - PPI  - BP < 150    #Syncopal fall  - 24H telemetry workup  - trend troponin given significant cardiac history  - f/u BNP  - echocardiogram  - orthostatic BP measurement  - f/u EP consult  - f/u completion CT C/A/P    #DO (Cr 1.7 < 0.9)  - f/u CK levels  - c/w gentle fluid rehydration  - f/u Ulytes  - holding sacubtril given DO    d/w Dr. Gerardo

## 2023-04-24 NOTE — CONSULT NOTE ADULT - NS ATTEND AMEND GEN_ALL_CORE FT
AT  VT   ICD shock    - TTE  - Increase BB as tolerated  - Amiodarone  - Baseline TSH, LFTs  - Management of SDH as per NS  - 1-month f-up

## 2023-04-24 NOTE — H&P ADULT - HISTORY OF PRESENT ILLNESS
73M PMH of CHF (EF 30-35% in 12/2022) with AICD ([employer] MRI XT DR), CAD s/p PCI (x5 2006) s/p CABG 12/2022, HTN, COPD (no home O2) presenting s/p syncopal fall +HT, +LOC, -AC (on ASA 81mg). Patient states that he was running late to play golf today and was running with his clubs when he syncopized and fell backward. The fall was witnessed by bystanders and states that he lost consciousness for a few minutes. No loss of fecal or urinary continence. No confusion after episode. Patient only complaining of headache and cervical spinal tenderness. Patient had no chest pain, dyspnea, +dizziness (felt the room spinning)

## 2023-04-24 NOTE — CONSULT NOTE ADULT - SUBJECTIVE AND OBJECTIVE BOX
Patient is a 73y old  Male who presents with a chief complaint of s/p syncopal fall, +HT > SDH/SAH (24 Apr 2023 11:16)    HPI:       PAST MEDICAL & SURGICAL HISTORY:  COPD (chronic obstructive pulmonary disease)      CAD (coronary artery disease)      Myocardial infarction  2006      CAD (coronary artery disease)  HAD CARDIAC CATH WITH STENTS 2006      Hypercholesteremia      HTN (hypertension)      CAD (coronary artery disease)  CARDIAC CATH 2006  AICD 2015      History of coronary artery stent placement                      PREVIOUS DIAGNOSTIC TESTING:      ECHO  FINDINGS:    STRESS  FINDINGS:    CATHETERIZATION  FINDINGS:    ELECTROPHYSIOLOGY STUDY  FINDINGS:    CAROTID ULTRASOUND:  FINDINGS    VENOUS DUPLEX SCAN:  FINDINGS:    CHEST CT PULMONARY ANGIO with IV Contrast:  FINDINGS:    MEDICATIONS  (STANDING):  amLODIPine   Tablet 5 milliGRAM(s) Oral daily  atorvastatin 40 milliGRAM(s) Oral at bedtime  furosemide    Tablet 40 milliGRAM(s) Oral daily  levETIRAcetam  IVPB 500 milliGRAM(s) IV Intermittent every 12 hours  metoprolol succinate ER 50 milliGRAM(s) Oral daily  pantoprazole    Tablet 40 milliGRAM(s) Oral before breakfast  sodium chloride 0.9%. 1000 milliLiter(s) (70 mL/Hr) IV Continuous <Continuous>    MEDICATIONS  (PRN):  acetaminophen     Tablet .. 650 milliGRAM(s) Oral every 6 hours PRN Temp greater or equal to 38C (100.4F), Mild Pain (1 - 3)  albuterol    90 MICROgram(s) HFA Inhaler 2 Puff(s) Inhalation every 6 hours PRN Shortness of Breath and/or Wheezing      FAMILY HISTORY:  Stroke (Father)        SOCIAL HISTORY:    CIGARETTES:    ALCOHOL:    Past Surgical History:    Allergies:    No Known Allergies      REVIEW OF SYSTEMS:    CONSTITUTIONAL: No fever, weight loss, chills, shakes, or fatigue  RESPIRATORY: No cough, wheezing, hemoptysis, or shortness of breath  CARDIOVASCULAR: No chest pain, dyspnea, palpitations, dizziness, syncope, paroxysmal nocturnal dyspnea, orthopnea, or arm or leg swelling  GASTROINTESTINAL: No abdominal  or epigastric pain, nausea, vomiting, hematemesis, diarrhea, constipation, melena or bright red blood.  NEUROLOGICAL: No headaches, memory loss, slurred speech, limb weakness, loss of strength, numbness, or tremors  MUSCULOSKELETAL: No joint pain or swelling, muscle, back, or extremity pain      Vital Signs Last 24 Hrs  T(C): 36.6 (24 Apr 2023 08:17), Max: 36.6 (24 Apr 2023 08:17)  T(F): 97.8 (24 Apr 2023 08:17), Max: 97.8 (24 Apr 2023 08:17)  HR: 82 (24 Apr 2023 08:17) (82 - 82)  BP: 135/94 (24 Apr 2023 08:17) (135/94 - 135/94)  BP(mean): --  RR: 18 (24 Apr 2023 08:17) (18 - 18)  SpO2: 99% (24 Apr 2023 08:17) (99% - 99%)    Parameters below as of 24 Apr 2023 08:17  Patient On (Oxygen Delivery Method): room air        PHYSICAL EXAM:        GENERAL: In no apparent distress, well nourished, and hydrated.  NECK: Supple, No JVD   HEART: Regular rate and rhythm; No murmurs, rubs, or gallops.  PULMONARY: Clear to auscultation and perfusion.  No rales, wheezing, or rhonchi bilaterally.  EXTREMITIES:  2+ Peripheral Pulses, no LE edema BL  NEUROLOGICAL: Grossly nonfocal      INTERPRETATION OF TELEMETRY:    ECG:    I&O's Detail      LABS:                        15.2   9.70  )-----------( 128      ( 24 Apr 2023 09:30 )             48.2     04-24    142  |  106  |  34<H>  ----------------------------<  66<L>  4.0   |  26  |  1.7<H>    Ca    9.6      24 Apr 2023 09:30    TPro  7.1  /  Alb  4.2  /  TBili  0.3  /  DBili  x   /  AST  23  /  ALT  15  /  AlkPhos  63  04-24    CARDIAC MARKERS ( 24 Apr 2023 10:50 )  x     / x     / 227 U/L / x     / 8.7 ng/mL  CARDIAC MARKERS ( 24 Apr 2023 09:30 )  x     / <0.01 ng/mL / x     / x     / x          PT/INR - ( 24 Apr 2023 10:50 )   PT: 13.60 sec;   INR: 1.19 ratio         PTT - ( 24 Apr 2023 10:50 )  PTT:34.1 sec    BNP  I&O's Detail    Daily Height in cm: 180.34 (24 Apr 2023 08:17)    Daily     RADIOLOGY & ADDITIONAL STUDIES: Patient is a 73y old  Male who presents with a chief complaint of s/p syncopal fall, +HT > SDH/SAH (24 Apr 2023 11:16)    HPI: Patient is a 72 year-old male with hx of CAD, PCI, MI s/p CABG, ischemic cardiomyopathy s/p ICD, HTN, dyslipidemia, COPD, VT s/p ICD shock presenting s/p syncopal fall +HT, +LOC, -AC (on ASA 81mg). Patient states that he was running late to play golf today and was running with his clubs when he syncopized and fell backward. The fall was witnessed by bystanders and states that he lost consciousness for a few minutes. No loss of fecal or urinary continence. No confusion after episode. Patient only complaining of headache and cervical spinal tenderness. Patient had no chest pain, dyspnea, +dizziness (felt the room spinning). Patient did not feel shock from AICD. Last catheterization 12/22 resulting in CABG.      PAST MEDICAL & SURGICAL HISTORY:  COPD (chronic obstructive pulmonary disease)      CAD (coronary artery disease)      Myocardial infarction  2006      CAD (coronary artery disease)  HAD CARDIAC CATH WITH STENTS 2006      Hypercholesteremia      HTN (hypertension)      CAD (coronary artery disease)  CARDIAC CATH 2006  AICD 2015      History of coronary artery stent placement      PREVIOUS DIAGNOSTIC TESTING:      ECHO  FINDINGS:  < from: TTE Echo Complete w/o Contrast w/ Doppler (12.24.21 @ 07:19) >  Summary:   1. Severely decreased global left ventricular systolic function.   2. Multiple left ventricular regional wall motion abnormalities exist.   See wall motion findings.   3. The mean global longitudinal peak strain by speckle tracking is -5.1%   which is markedly reduced.      Endocardial borders are poorly visualized. Unable to evaluate for   size, thickness, function and regional wall motion. Consider repeat study   with echo enhancement agent to better visualize if clinically indicated.   4. Mildly enlarged left atrium.   5. Normal right atrial size.   6. No evidence of mitral valve regurgitation.   7. Mild tricuspid regurgitation.   8. Mild aortic regurgitation.   9. LA volume Index is 33.3 ml/m² ml/m2.  10. Peak transaortic gradient equals 9.1 mmHg, mean transaortic gradient   equals 3.8 mmHg, the calculated aortic valve area equals 2.32 cm² by the   continuity equation consistent with mild aortic stenosis.    < end of copied text >    STRESS  FINDINGS:    CATHETERIZATION  FINDINGS:  FINDINGS:     Coronary Dominance: Right dominate      LM: Mild Disease    LAD: Mild prox disease. 90% stenosis in mid LAD. 99% in stent stenosis distal LAD. D1 90% lesion, D2 99% lesion at the ostium. RCA supplied collateral to Apical LAD and diag    CX: Prox Moderate in-stent diffuse disease, 90% calcified lesion in distal Lcx. Moderate disease OM1    RCA: Mild diffuse disease       LVEDP: 9 mmHg       ESTIMATED BLOOD LOSS: < 10 mL        CONDITION:     [X] Good     [] Fair     [] Critical        SPECIMEN REMOVED: N/A       POST-OP DIAGNOSIS:      [] Normal Coronary Angiogram     [] Mild Coronary Artery Disease (< 50% stenosis)     [X] 2 Vessel Coronary Artery Disease     ELECTROPHYSIOLOGY STUDY  FINDINGS:    CAROTID ULTRASOUND:  FINDINGS    VENOUS DUPLEX SCAN:  FINDINGS:    CHEST CT PULMONARY ANGIO with IV Contrast:  FINDINGS:    MEDICATIONS  (STANDING):  amLODIPine   Tablet 5 milliGRAM(s) Oral daily  atorvastatin 40 milliGRAM(s) Oral at bedtime  furosemide    Tablet 40 milliGRAM(s) Oral daily  levETIRAcetam  IVPB 500 milliGRAM(s) IV Intermittent every 12 hours  metoprolol succinate ER 50 milliGRAM(s) Oral daily  pantoprazole    Tablet 40 milliGRAM(s) Oral before breakfast  sodium chloride 0.9%. 1000 milliLiter(s) (70 mL/Hr) IV Continuous <Continuous>    MEDICATIONS  (PRN):  acetaminophen     Tablet .. 650 milliGRAM(s) Oral every 6 hours PRN Temp greater or equal to 38C (100.4F), Mild Pain (1 - 3)  albuterol    90 MICROgram(s) HFA Inhaler 2 Puff(s) Inhalation every 6 hours PRN Shortness of Breath and/or Wheezing      FAMILY HISTORY:  Stroke (Father)    SOCIAL HISTORY: No smoking, ETOH or illicit drug use    Past Surgical History: see above    Allergies:  No Known Allergies      REVIEW OF SYSTEMS:  CONSTITUTIONAL: No fever, weight loss, chills, shakes, or fatigue  RESPIRATORY: No cough, wheezing, hemoptysis, or shortness of breath  CARDIOVASCULAR: No chest pain, dyspnea, palpitations, paroxysmal nocturnal dyspnea, orthopnea, or arm or leg swelling  GASTROINTESTINAL: No abdominal  or epigastric pain, nausea, vomiting, hematemesis, diarrhea, constipation, melena or bright red blood.  NEUROLOGICAL: +Syncope; No headaches, memory loss, slurred speech, limb weakness, loss of strength, numbness, or tremors  MUSCULOSKELETAL: No joint pain or swelling, muscle, back, or extremity pain      Vital Signs Last 24 Hrs  T(C): 36.6 (24 Apr 2023 08:17), Max: 36.6 (24 Apr 2023 08:17)  T(F): 97.8 (24 Apr 2023 08:17), Max: 97.8 (24 Apr 2023 08:17)  HR: 82 (24 Apr 2023 08:17) (82 - 82)  BP: 135/94 (24 Apr 2023 08:17) (135/94 - 135/94)  BP(mean): --  RR: 18 (24 Apr 2023 08:17) (18 - 18)  SpO2: 99% (24 Apr 2023 08:17) (99% - 99%)    Parameters below as of 24 Apr 2023 08:17  Patient On (Oxygen Delivery Method): room air        PHYSICAL EXAM:  GENERAL: In no apparent distress, well nourished, and hydrated.  NECK: Supple, No JVD   HEART: Regular rate and rhythm; No murmurs, rubs, or gallops.  PULMONARY: Clear to auscultation and perfusion.  No rales, wheezing, or rhonchi bilaterally.  EXTREMITIES:  2+ Peripheral Pulses, no LE edema BL  NEUROLOGICAL: Grossly nonfocal      INTERPRETATION OF TELEMETRY: Sinus tachycardia 110 bpm    ECG:  < from: 12 Lead ECG (04.24.23 @ 08:20) >    Ventricular Rate 117 BPM    Atrial Rate 133 BPM    P-R Interval 140 ms    QRS Duration 82 ms    Q-T Interval 264 ms    QTC Calculation(Bazett) 368 ms    P Axis 67 degrees    R Axis -48 degrees    T Axis 88 degrees    Diagnosis Line Sinus tachycardia with occasional Premature ventricular complexes  Low voltage QRS  Left anterior fascicular block  Possible Inferior infarct , age undetermined  Anteroseptal infarct , age undetermined  Abnormal ECG    Confirmed by Madeleine Bruno MD ([id number]) on 4/24/2023 10:14:53 AM    < end of copied text >      I&O's Detail      LABS:                        15.2   9.70  )-----------( 128      ( 24 Apr 2023 09:30 )             48.2     04-24    142  |  106  |  34<H>  ----------------------------<  66<L>  4.0   |  26  |  1.7<H>    Ca    9.6      24 Apr 2023 09:30    TPro  7.1  /  Alb  4.2  /  TBili  0.3  /  DBili  x   /  AST  23  /  ALT  15  /  AlkPhos  63  04-24    CARDIAC MARKERS ( 24 Apr 2023 10:50 )  x     / x     / 227 U/L / x     / 8.7 ng/mL  CARDIAC MARKERS ( 24 Apr 2023 09:30 )  x     / <0.01 ng/mL / x     / x     / x          PT/INR - ( 24 Apr 2023 10:50 )   PT: 13.60 sec;   INR: 1.19 ratio         PTT - ( 24 Apr 2023 10:50 )  PTT:34.1 sec    BNP  I&O's Detail    Daily Height in cm: 180.34 (24 Apr 2023 08:17)    Daily     RADIOLOGY & ADDITIONAL STUDIES: Patient is a 73y old  Male who presents with a chief complaint of s/p syncopal fall, +HT > SDH/SAH (24 Apr 2023 11:16)    HPI: Patient is a 72 year-old male with hx of CAD, PCI, MI s/p CABG, ischemic cardiomyopathy s/p ICD, HTN, dyslipidemia, COPD, VT s/p ICD shock presenting s/p syncopal fall +HT, +LOC, -AC (on ASA 81mg). Patient states that he was running late to play golf today and was running with his clubs when he synopsized and fell backward. The fall was witnessed by bystanders and states that he lost consciousness for a few minutes. No loss of fecal or urinary continence. No confusion after episode. Patient only complaining of headache and cervical spinal tenderness. Patient had no chest pain, dyspnea, +dizziness (felt the room spinning). Patient did not feel shock from AICD. Last catheterization 12/22 resulting in CABG. Last ICD shock at that time.  Denies prior syncopal episodes palpitations, dizziness, dyspnea, chest pain.  In trauma eval negative except for small SDH and SAH, ASA+Plavix on hold      PAST MEDICAL & SURGICAL HISTORY:  COPD (chronic obstructive pulmonary disease)      CAD (coronary artery disease)      Myocardial infarction  2006      CAD (coronary artery disease)  HAD CARDIAC CATH WITH STENTS 2006      Hypercholesteremia      HTN (hypertension)      CAD (coronary artery disease)  CARDIAC CATH 2006  AICD 2015      History of coronary artery stent placement      PREVIOUS DIAGNOSTIC TESTING:      ECHO  FINDINGS:  < from: TTE Echo Complete w/o Contrast w/ Doppler (12.24.21 @ 07:19) >  Summary:   1. Severely decreased global left ventricular systolic function.   2. Multiple left ventricular regional wall motion abnormalities exist.   See wall motion findings.   3. The mean global longitudinal peak strain by speckle tracking is -5.1%   which is markedly reduced.      Endocardial borders are poorly visualized. Unable to evaluate for   size, thickness, function and regional wall motion. Consider repeat study   with echo enhancement agent to better visualize if clinically indicated.   4. Mildly enlarged left atrium.   5. Normal right atrial size.   6. No evidence of mitral valve regurgitation.   7. Mild tricuspid regurgitation.   8. Mild aortic regurgitation.   9. LA volume Index is 33.3 ml/m² ml/m2.  10. Peak transaortic gradient equals 9.1 mmHg, mean transaortic gradient   equals 3.8 mmHg, the calculated aortic valve area equals 2.32 cm² by the   continuity equation consistent with mild aortic stenosis.    < end of copied text >    STRESS  FINDINGS:    CATHETERIZATION  FINDINGS:  FINDINGS:     Coronary Dominance: Right dominate  LM: Mild Disease  LAD: Mild prox disease. 90% stenosis in mid LAD. 99% in stent stenosis distal LAD. D1 90% lesion, D2 99% lesion at the ostium. RCA supplied collateral to Apical LAD and diag  CX: Prox Moderate in-stent diffuse disease, 90% calcified lesion in distal Lcx. Moderate disease OM1  RCA: Mild diffuse disease  LVEDP: 9 mmHg   [X] 2 Vessel Coronary Artery Disease     ELECTROPHYSIOLOGY STUDY  FINDINGS:    CAROTID ULTRASOUND:  FINDINGS    VENOUS DUPLEX SCAN:  FINDINGS:    CHEST CT PULMONARY ANGIO with IV Contrast:  FINDINGS:    MEDICATIONS  (STANDING):  amLODIPine   Tablet 5 milliGRAM(s) Oral daily  atorvastatin 40 milliGRAM(s) Oral at bedtime  furosemide    Tablet 40 milliGRAM(s) Oral daily  levETIRAcetam  IVPB 500 milliGRAM(s) IV Intermittent every 12 hours  metoprolol succinate ER 50 milliGRAM(s) Oral daily  pantoprazole    Tablet 40 milliGRAM(s) Oral before breakfast  sodium chloride 0.9%. 1000 milliLiter(s) (70 mL/Hr) IV Continuous <Continuous>    MEDICATIONS  (PRN):  acetaminophen     Tablet .. 650 milliGRAM(s) Oral every 6 hours PRN Temp greater or equal to 38C (100.4F), Mild Pain (1 - 3)  albuterol    90 MICROgram(s) HFA Inhaler 2 Puff(s) Inhalation every 6 hours PRN Shortness of Breath and/or Wheezing      FAMILY HISTORY:  Stroke (Father)    SOCIAL HISTORY: No smoking, ETOH or illicit drug use    Past Surgical History: see above    Allergies:  No Known Allergies      REVIEW OF SYSTEMS:  CONSTITUTIONAL: No fever, weight loss, chills, shakes, or fatigue  RESPIRATORY: No cough, wheezing, hemoptysis, or shortness of breath  CARDIOVASCULAR: No chest pain, dyspnea, palpitations, paroxysmal nocturnal dyspnea, orthopnea, or arm or leg swelling  GASTROINTESTINAL: No abdominal  or epigastric pain, nausea, vomiting, hematemesis, diarrhea, constipation, melena or bright red blood.  NEUROLOGICAL: +Syncope; No headaches, memory loss, slurred speech, limb weakness, loss of strength, numbness, or tremors  MUSCULOSKELETAL: No joint pain or swelling, muscle, back, or extremity pain      Vital Signs Last 24 Hrs  T(C): 36.6 (24 Apr 2023 08:17), Max: 36.6 (24 Apr 2023 08:17)  T(F): 97.8 (24 Apr 2023 08:17), Max: 97.8 (24 Apr 2023 08:17)  HR: 82 (24 Apr 2023 08:17) (82 - 82)  BP: 135/94 (24 Apr 2023 08:17) (135/94 - 135/94)  BP(mean): --  RR: 18 (24 Apr 2023 08:17) (18 - 18)  SpO2: 99% (24 Apr 2023 08:17) (99% - 99%)    Parameters below as of 24 Apr 2023 08:17  Patient On (Oxygen Delivery Method): room air        PHYSICAL EXAM:  GENERAL: In no apparent distress, well nourished, and hydrated.  NECK: Supple, No JVD   HEART: Regular rate and rhythm; No murmurs, rubs, or gallops.  PULMONARY: Clear to auscultation and perfusion.  No rales, wheezing, or rhonchi bilaterally.  EXTREMITIES:  2+ Peripheral Pulses, no LE edema BL  NEUROLOGICAL: Grossly nonfocal      INTERPRETATION OF TELEMETRY: Sinus tachycardia 110 bpm    ECG:  < from: 12 Lead ECG (04.24.23 @ 08:20) >    Ventricular Rate 117 BPM    Atrial Rate 133 BPM    P-R Interval 140 ms    QRS Duration 82 ms    Q-T Interval 264 ms    QTC Calculation(Bazett) 368 ms    P Axis 67 degrees    R Axis -48 degrees    T Axis 88 degrees    Diagnosis Line Sinus tachycardia with occasional Premature ventricular complexes  Low voltage QRS  Left anterior fascicular block  Possible Inferior infarct , age undetermined  Anteroseptal infarct , age undetermined  Abnormal ECG    Confirmed by Madeleine Bruno MD ([id number]) on 4/24/2023 10:14:53 AM    < end of copied text >      I&O's Detail      LABS:                        15.2   9.70  )-----------( 128      ( 24 Apr 2023 09:30 )             48.2     04-24    142  |  106  |  34<H>  ----------------------------<  66<L>  4.0   |  26  |  1.7<H>    Ca    9.6      24 Apr 2023 09:30    TPro  7.1  /  Alb  4.2  /  TBili  0.3  /  DBili  x   /  AST  23  /  ALT  15  /  AlkPhos  63  04-24    CARDIAC MARKERS ( 24 Apr 2023 10:50 )  x     / x     / 227 U/L / x     / 8.7 ng/mL  CARDIAC MARKERS ( 24 Apr 2023 09:30 )  x     / <0.01 ng/mL / x     / x     / x          PT/INR - ( 24 Apr 2023 10:50 )   PT: 13.60 sec;   INR: 1.19 ratio         PTT - ( 24 Apr 2023 10:50 )  PTT:34.1 sec    BNP  I&O's Detail    Daily Height in cm: 180.34 (24 Apr 2023 08:17)    Daily     RADIOLOGY & ADDITIONAL STUDIES:

## 2023-04-24 NOTE — H&P ADULT - NSHPPHYSICALEXAM_GEN_ALL_CORE
PHYSICAL EXAM:  GENERAL: A&O, NAD, GCS 15  HEENT: Normocephalic, atraumatic  BACK: No stepoffs, No tenderness   CHEST/LUNG: Bilateral breath sounds  HEART: Regular rate and rhythm  ABDOMEN: Soft, Nondistended, Nontender, Pelvis stable  EXTREMITIES:  Moving all extremities, pulses throughout, no deformities

## 2023-04-24 NOTE — ED ADULT TRIAGE NOTE - CHIEF COMPLAINT QUOTE
pt BIBA s/p syncopal episode  pt states he was rushing to golf , pt c/o headache, and chest pain  , no laceration or cut noted to back of head

## 2023-04-24 NOTE — ED ADULT NURSE REASSESSMENT NOTE - NS ED NURSE REASSESS COMMENT FT1
Pt BP 84/50 HR 70, RN spoke to MD Gerardo and was told to call trauma resident at [phone]. RN called extension multiple times with no answer. Will continue to monitor.

## 2023-04-24 NOTE — ED PROVIDER NOTE - CONSIDERATION OF ADMISSION OBSERVATION
Consideration of Admission/Observation Patient with unexplained episode of syncope and also found to have (+) subdural with SAH. Will require admission.

## 2023-04-24 NOTE — CONSULT NOTE ADULT - ASSESSMENT
Assessment & Plan    73y Male  s/p syncopal fall +HT, +LOC, -AC (on ASA 81mg)    NEURO:  #Acute pain    -acetaminophen Tablet .. 650 milliGRAM(s) Oral every 6 hours PRN Temp greater or equal to 38C (100.4F), Mild Pain (1 - 3)    -levETIRAcetam  IVPB 500 milliGRAM(s) IV Intermittent every 12 hours        RESP:   #Oxygenation    -Saturating well on RA    -Hx of COPD does not require supplemental oxygen   #Activity    -increase as tolerated?    CARDS:   Start Amiodarone, 150mg bolus f/b 1mg/min x 6 hours then 0.5mg/min x 18 hours per EP.  - When Amio drip completed, start PO 200mg BID for 2 weeks f/b 200mg daily  #Acute syncopal workup pending  - EKG in ED shows sinus tachycardia with occasional Premature ventricular complexes  - Obtain orthostatic vitals  - TTE Echo with contrast (12/2012):  Severely decreased global left ventricular systolic function, multiple left ventricular regional wall motion abnormalities exist.   #Episode of VTach  -[employer] DC AICD interrogation:  -Episode of VT terminated by shock followed by SVT ~200bpm terminated by second shock  -Several brief episodes of NSVT  -Device functioning properly  - 0.1%  -AP 58.7%  Mode: AAIR<=>DDDR  bpm   #Hx of MI (2006), CAD, Stents placed in 2006, HTN, AICD 2015, CHF (EF 30-35%)  - Rx-amLODIPine  Tablet 5 daily  - furosemide mg/daily  - metoprolol ER 50mg/daily      GI/NUTR:   #Regular diet    -aspiration precautions, HOB 30  #GI Prophylaxis    -not indicated  #Bowel regimen    -Not indicated     /RENAL:   #voiding     Labs:          BUN/Cr- 34/1.7  -->          Electrolytes-Na 142 // K 4.0 // Mg -- //  Phos -- (04-24 @ 09:30)    #maintain euvolemia             HEME/ONC:   #DVT prophylaxis    -, SCDs    -if DVT prophylaxis to be held, document and place VTE order     Labs: Hb/Hct:  15.2/48.2  -->                      Plts:  128  -->                 PTT/INR:  34.1/1.19  --->     T&S Expires: 4/27  Blood Consent-obtain if acute anemia, q6 CBC    ID:  WBC- 9.70  --->>  Temp trend- 24hrs T(F): 97.8 (04-24 @ 08:17), Max: 97.8 (04-24 @ 08:17)  Current antibiotics- not indicated      ENDO:    -FSG q6    -Glucose goal 140-180    -if above 180 start ISS    MSK:     Activity - Out of bed with assistance      LINES/DRAINS:  PIV    ADVANCED DIRECTIVES:  Full Code    HCP/Emergency Contact- Martha (Wife) [phone]    INDICATION FOR SDU: Syncope and collapse      DISPO:   Case discussed with attending Dr. Nobles Assessment & Plan    73y Male  s/p syncopal fall +HT, +LOC, -AC (on ASA 81mg)    NEURO:  #Acute pain    -acetaminophen Tablet .. 650 milliGRAM(s) Oral every 6 hours PRN Temp greater or equal to 38C (100.4F), Mild Pain (1 - 3)    -levETIRAcetam  IVPB 500 milliGRAM(s) IV Intermittent every 12 hours        RESP:   #Oxygenation    -Saturating well on RA    -Hx of COPD does not require supplemental oxygen   #Activity  - Ambulate with assistance     CARDS:   Start Amiodarone, 150mg bolus f/b 1mg/min x 6 hours then 0.5mg/min x 18 hours per EP.  - When Amio drip completed, start PO 200mg BID for 2 weeks f/b 200mg daily  #Acute syncopal workup pending  - EKG in ED shows sinus tachycardia with occasional Premature ventricular complexes  - Obtain orthostatic vitals  - TTE Echo with contrast (12/2012):  Severely decreased global left ventricular systolic function, multiple left ventricular regional wall motion abnormalities exist.   #Episode of VTach  -[employer] DC AICD interrogation:  -Episode of VT terminated by shock followed by SVT ~200bpm terminated by second shock  -Several brief episodes of NSVT  -Device functioning properly  - 0.1%  -AP 58.7%  Mode: AAIR<=>DDDR  bpm   #Hx of MI (2006), CAD, Stents placed in 2006, HTN, AICD 2015, CHF (EF 30-35%)  - Rx-amLODIPine  Tablet 5 daily  - furosemide mg/daily  - metoprolol ER 50mg/daily      GI/NUTR:   #Regular diet    -aspiration precautions, HOB 30  #GI Prophylaxis    -not indicated  #Bowel regimen    -Not indicated     /RENAL:   #voiding     Labs:          BUN/Cr- 34/1.7  -->          Electrolytes-Na 142 // K 4.0 // Mg -- //  Phos -- (04-24 @ 09:30)    #maintain euvolemia             HEME/ONC:   #DVT prophylaxis    -, SCDs    -if DVT prophylaxis to be held, document and place VTE order     Labs: Hb/Hct:  15.2/48.2  -->                      Plts:  128  -->                 PTT/INR:  34.1/1.19  --->     T&S Expires: 4/27  Blood Consent-obtain if acute anemia, q6 CBC    ID:  WBC- 9.70  --->>  Temp trend- 24hrs T(F): 97.8 (04-24 @ 08:17), Max: 97.8 (04-24 @ 08:17)  Current antibiotics- not indicated      ENDO:    -FSG q6    -Glucose goal 140-180    -if above 180 start ISS    MSK:     Activity - Out of bed with assistance      LINES/DRAINS:  PIV    ADVANCED DIRECTIVES:  Full Code    HCP/Emergency Contact- Martha (Wife) [phone]    INDICATION FOR SDU: Syncope and collapse      DISPO:   Case discussed with attending Dr. Nobles Assessment & Plan    73y Male  s/p syncopal fall with b/l Frontal SDH, SAH along falx with no midline shift.    NEURO:  #Acute pain    -acetaminophen Tablet .. 650 milliGRAM(s) Oral every 6 hours PRN Temp greater or equal to 38C (100.4F), Mild Pain (1 - 3)  #SDH    -levETIRAcetam  IVPB 500 milliGRAM(s) IV Intermittent every 12 hours        RESP:   #Oxygenation    -Saturating well on RA    -Hx of COPD does not require supplemental oxygen   #Activity  - Ambulate with assistance     CARDS:   Start Amiodarone, 150mg bolus f/b 1mg/min x 6 hours then 0.5mg/min x 18 hours per EP.  - When Amio drip completed, start PO 200mg BID for 2 weeks f/b 200mg daily  #Acute syncopal workup pending  - EKG in ED shows sinus tachycardia with occasional Premature ventricular complexes  - Obtained orthostatic vitals  - TTE Echo with contrast (12/2012):  Severely decreased global left ventricular systolic function, multiple left ventricular regional wall motion abnormalities exist.   #Episode of VTach  -[employer] DC AICD interrogation:  -Episode of VT terminated by shock followed by SVT ~200bpm terminated by second shock  -Several brief episodes of NSVT  -Device functioning properly  - 0.1%  -AP 58.7%  Mode: AAIR<=>DDDR  bpm   #Hx of MI (2006), CAD, Stents placed in 2006, HTN, AICD 2015, CHF (EF 30-35%)  - Rx-amLODIPine  Tablet 5 daily  - furosemide mg/daily  - metoprolol ER 50mg/daily      GI/NUTR:   #Regular diet    -aspiration precautions, HOB 30  #GI Prophylaxis    -not indicated  #Bowel regimen    -Not indicated     /RENAL:   #voiding     Labs:          BUN/Cr- 34/1.7  -->          Electrolytes-Na 142 // K 4.0 // Mg -- //  Phos -- (04-24 @ 09:30)  #DO (baseline Cr 0.9) currently 1.7  -Continue with maintenance fluid  - Trend CK f/u 8:00PM  #maintain euvolemia             HEME/ONC:   #DVT prophylaxis    -, SCDs    -Holding DVT prophylaxis as per primary team, will reassess 4/25 AM.     Labs: Hb/Hct:  15.2/48.2  -->                      Plts:  128  -->                 PTT/INR:  34.1/1.19  --->     T&S Expires: 4/27  Blood Consent-obtain if acute anemia, q6 CBC    ID:  WBC- 9.70  --->>  Temp trend- 24hrs T(F): 97.8 (04-24 @ 08:17), Max: 97.8 (04-24 @ 08:17)  Current antibiotics- not indicated      ENDO:    -FSG q6    -Glucose goal 140-180    -if above 180 start ISS    MSK:     Activity - Out of bed with assistance      LINES/DRAINS:  PIV    ADVANCED DIRECTIVES:  Full Code    HCP/Emergency Contact- Martha (Wife) [phone]    INDICATION FOR SDU: Syncope and collapse      DISPO:   Case discussed with attending Dr. Nobles 73y Male  s/p syncopal fall with b/l Frontal SDH, SAH along falx with no midline shift.  HCT x2 stable bleed    NEURO:  #acute frontal SDH/SAH    -HCT x2 stable bleed    -NSGY c/s recs: q4 neurochecks ,SBP <150, HOB elevated    -Keppra 500mg q12  #acute pain    -acetaminophen Tablet .. 650 milliGRAM(s) Oral every 6 hours PRN Temp greater or equal to 38C (100.4F), Mild Pain (1 - 3)     RESP:   #Oxygenation    -Saturating well on RA    -Hx of COPD does not require supplemental oxygen   #Activity  - Ambulate with assistance     CARDS:   #Acute syncopal workup pending  - EKG in ED shows sinus tachycardia with occasional Premature ventricular complexes  - Obtained orthostatic vitals  - TTE Echo with contrast (12/2021):  Severely decreased global left ventricular systolic function, multiple l  #acute episode of VTach on AICD interrogation    -EP c/s recs: Start Amiodarone, 150mg bolus f/b 1mg/min x 6 hours then 0.5mg/min x 18 hours per EP. When Amio drip completed, start PO 200mg BID for 2 weeks f/b 200mg daily.   #Episode of VTach  -[employer] DC AICD interrogation:  -Episode of VT terminated by shock followed by SVT ~200bpm terminated by second shock  -Several brief episodes of NSVT  -Device functioning properly  - 0.1%  -AP 58.7%  Mode: AAIR<=>DDDR  bpm   #Hx of MI (2006), CAD, Stents placed in 2006, HTN, AICD 2015, CHF (EF 30-35%)  - Rx-amLODIPine  Tablet 5 daily  - furosemide mg/daily  - metoprolol ER 50mg/daily      GI/NUTR:   #Regular diet    -aspiration precautions, HOB 30  #GI Prophylaxis    -not indicated  #Bowel regimen    -Not indicated     /RENAL:   #voiding     Labs:          BUN/Cr- 34/1.7  -->          Electrolytes-Na 142 // K 4.0 // Mg -- //  Phos -- (04-24 @ 09:30)  #DO (baseline Cr 0.9) currently 1.7  -Continue with maintenance fluid  - Trend CK f/u 8:00PM  #maintain euvolemia             HEME/ONC:   #DVT prophylaxis    -, SCDs    -Holding DVT prophylaxis as per primary team, will reassess 4/25 AM.     Labs: Hb/Hct:  15.2/48.2  -->                      Plts:  128  -->                 PTT/INR:  34.1/1.19  --->     T&S Expires: 4/27  Blood Consent-obtain if acute anemia, q6 CBC    ID:  WBC- 9.70  --->>  Temp trend- 24hrs T(F): 97.8 (04-24 @ 08:17), Max: 97.8 (04-24 @ 08:17)  Current antibiotics- not indicated      ENDO:    -FSG q6    -Glucose goal 140-180    -if above 180 start ISS    MSK:     Activity - Out of bed with assistance      LINES/DRAINS:  PIV    ADVANCED DIRECTIVES:  Full Code    HCP/Emergency Contact- Martha (Wife) [phone]    INDICATION FOR SDU: Syncope and collapse      DISPO:   Case discussed with attending Dr. Nobles 73y Male  s/p syncopal fall with b/l Frontal SDH, SAH along falx with no midline shift.  HCT x2 stable bleed    NEURO:  #acute frontal SDH/SAH    -HCT x2 stable bleed    -NSGY c/s recs: q4 neurochecks ,SBP <150, HOB elevated    -Keppra 500mg q12  #acute pain    -acetaminophen Tablet .. 650 milliGRAM(s) Oral every 6 hours PRN Temp greater or equal to 38C (100.4F), Mild Pain (1 - 3)     RESP:   #hx of COPD (no home o2)    -Albuterol     -maintain saturation >90%    #Activity    - Ambulate with assistance     CARDS:   #Acute syncopal workup pending    -Obtain orthostatic vitals, Echo  #acute episode of VTach on AICD interrogation    -episodes of SVT/Vtach seen on device terminated by shock x2, device functioning properly    -EP c/s recs: Start Amiodarone, 150mg bolus f/b 1mg/min x 6 hours then 0.5mg/min x 18 hours per EP. When Amio drip completed, start PO 200mg BID for     -F/u with Dr. Laguerre in 1month for eval for for outpatient EP study/ablation once patient can tolerate AC (currently only on ASA)  #Hx of MI (2006), CAD, Stents placed in 2006 AICD 2015,     -Metoprolol 50mg XL    -ASA 81mg  #hx of CHF (EF 30-35%)    -Entresto HOLDING secondary to DO as per primary team    -Furosemide 20mg  #hx of HTN    -Amlodipine 5mg HOLDING until SBP stable  #hx of HLD    -Atorvastatin 40mg    -Ezetimibe 10mg   #Imaging  - EKG in ED shows sinus tachycardia with occasional Premature ventricular complexes  - TTE Echo with contrast (12/2021):  Severely decreased global left ventricular systolic function, multiple    -Repeat ECHO pending  #Labs    -Trop neg x1 (f/u 2 more sets)    GI/NUTR:   #Regular diet    -aspiration precautions, HOB 30  #GI Prophylaxis    -not indicated  #Bowel regimen    -Not indicated, senna prn    /RENAL:   #voiding     Labs:          BUN/Cr- 34/1.7  -->          Electrolytes-Na 142 // K 4.0 // Mg -- //  Phos -- (04-24 @ 09:30)  #DO (baseline Cr 0.9) currently 1.7  -Continue with maintenance fluid  - Trend CK f/u w/ labs due to unknown time down currently <300  #maintain euvolemia           HEME/ONC:   #DVT prophylaxis    -SCDs    -Holding DVT prophylaxis as per primary team, will reassess 4/25 AM.   Labs: Hb/Hct:  15.2/48.2  -->                    Plts:  128  -->               PTT/INR:  34.1/1.19  --->     T&S Expires: 4/27    ID:  WBC- 9.70  --->>  Temp trend- 24hrs T(F): 97.8 (04-24 @ 08:17), Max: 97.8 (04-24 @ 08:17)  Current antibiotics- not indicated      ENDO:    -FSG ACHS    -Glucose goal 140-180    -if above 180 start ISS    MSK:     Activity - Out of bed with assistance      LINES/DRAINS:  PIV    ADVANCED DIRECTIVES:  Full Code    HCP/Emergency Contact- Martha (Wife) [phone]    INDICATION FOR SDU: Syncope and collapse, SDH q4 neurochecks    DISPO:   Case discussed with attending Dr. Nobles    Follow UP    Troponins 2 more sets    -f/u Amiodarone recs per EP    -f/u EPS outpuatient with Dr. laguerre for ablation eval    -f/u BNP    -f/u CK trend    -f/u ECHO       73y Male  s/p syncopal fall with b/l Frontal SDH, SAH along falx with no midline shift.  HCT x2 stable bleed    NEURO:  #acute frontal SDH/SAH    -HCT x2 stable bleed    -NSGY c/s recs: q4 neurochecks ,SBP <150, HOB elevated    -Keppra 500mg q12  #acute pain    -acetaminophen Tablet .. 650 milliGRAM(s) Oral every 6 hours PRN Temp greater or equal to 38C (100.4F), Mild Pain (1 - 3)     RESP:   #hx of COPD (no home o2)    -Albuterol     -maintain saturation >90%    #Activity    - Ambulate with assistance     CARDS:   #Acute syncopal workup pending    -Obtain orthostatic vitals, Echo  #acute episode of VTach on AICD interrogation    -episodes of SVT/Vtach seen on device terminated by shock x2, device functioning properly    -EP c/s recs: Start Amiodarone, 150mg bolus f/b 1mg/min x 6 hours then 0.5mg/min x 18 hours per EP. When Amio drip completed, start PO 200mg BID for     -F/u with Dr. Laguerre in 1month for eval for for outpatient EP study/ablation once patient can tolerate AC (currently only on ASA)  #Hx of MI (2006), CAD, Stents placed in 2006 AICD 2015,     -Metoprolol 50mg XL    -ASA 81mg  #hx of CHF (EF 30-35%)    -Entresto HOLDING secondary to DO as per primary team    -Furosemide 20mg  #hx of HTN    -Amlodipine 5mg HOLDING until SBP stable  #hx of HLD    -Atorvastatin 40mg    -Ezetimibe 10mg   #Imaging  - EKG in ED shows sinus tachycardia with occasional Premature ventricular complexes  - TTE Echo with contrast (12/2021):  Severely decreased global left ventricular systolic function, multiple    -Repeat ECHO pending  #Labs    -Trop neg x1 (f/u 2 more sets)    GI/NUTR:   #Regular diet    -aspiration precautions, HOB 30  #GI Prophylaxis    -not indicated  #Bowel regimen    -Not indicated, senna prn    /RENAL:   #voiding     Labs:          BUN/Cr- 34/1.7  -->          Electrolytes-Na 142 // K 4.0 // Mg -- //  Phos -- (04-24 @ 09:30)  #DO (baseline Cr 0.9) currently 1.7  -Continue with maintenance fluid  - Trend CK f/u w/ labs due to unknown time down currently <300  #maintain euvolemia           HEME/ONC:   #DVT prophylaxis    -SCDs    -Holding DVT prophylaxis as per primary team, will reassess 4/25 AM.   Labs: Hb/Hct:  15.2/48.2  -->                    Plts:  128  -->               PTT/INR:  34.1/1.19  --->     T&S Expires: 4/27    ID:  WBC- 9.70  --->>  Temp trend- 24hrs T(F): 97.8 (04-24 @ 08:17), Max: 97.8 (04-24 @ 08:17)  Current antibiotics- not indicated      ENDO:    -FSG ACHS    -Glucose goal 140-180    -if above 180 start ISS    MSK:     Activity - Out of bed with assistance      LINES/DRAINS:  PIV    ADVANCED DIRECTIVES:  Full Code    HCP/Emergency Contact- Martha (Wife) [phone]    INDICATION FOR SICU: Syncope and collapse, SDH q4 neurochecks    DISPO:   Case discussed with attending Dr. Nobles    Follow UP    Troponins 2 more sets    -f/u Amiodarone recs per EP    -f/u EPS outpuatient with Dr. laguerre for ablation eval    -f/u BNP    -f/u CK trend    -f/u ECHO       73y Male  s/p syncopal fall with b/l Frontal SDH, SAH along falx with no midline shift.  HCT x2 stable bleed    NEURO:  #acute frontal SDH/SAH    -HCT x2 stable bleed    -NSGY c/s recs: q4 neurochecks ,SBP <150, HOB elevated    -Keppra 500mg q12  #acute pain    -acetaminophen Tablet .. 650 milliGRAM(s) Oral every 6 hours PRN Temp greater or equal to 38C (100.4F), Mild Pain (1 - 3)     RESP:   #hx of COPD (no home o2)    -Albuterol     -maintain saturation >90%    #Activity    - Ambulate with assistance     CARDS:   #Acute syncopal workup pending    -Obtain orthostatic vitals, Echo  #acute episode of VTach on AICD interrogation    -episodes of SVT/Vtach seen on device terminated by shock x2, device functioning properly    -EP c/s recs: Start Amiodarone, 150mg bolus f/b 1mg/min x 6 hours then 0.5mg/min x 18 hours per EP. When Amio drip completed, start PO 200mg BID for     -F/u with Dr. Laguerre in 1month for eval for for outpatient EP study/ablation once patient can tolerate AC (currently only on ASA)  #Hx of MI (2006), CAD, Stents placed in 2006 AICD 2015,     -Metoprolol 50mg XL    -ASA 81mg  #hx of CHF (EF 30-35%)    -Entresto HOLDING secondary to DO as per primary team    -Furosemide 20mg holding in setting of DO  #hx of HTN    -Amlodipine 5mg HOLDING until SBP stable  #hx of HLD    -Atorvastatin 40mg    -Ezetimibe 10mg   #Imaging  - EKG in ED shows sinus tachycardia with occasional Premature ventricular complexes  - TTE Echo with contrast (12/2021):  Severely decreased global left ventricular systolic function, multiple    -Repeat ECHO pending  #Labs    -Trop neg x1 (f/u 2 more sets)    GI/NUTR:   #Regular diet    -aspiration precautions, HOB 30  #GI Prophylaxis    -not indicated  #Bowel regimen    -Not indicated, senna prn    /RENAL:   #voiding     Labs:          BUN/Cr- 34/1.7  -->          Electrolytes-Na 142 // K 4.0 // Mg -- //  Phos -- (04-24 @ 09:30)  #DO (baseline Cr 0.9) currently 1.7  -Continue with maintenance fluid  - Trend CK f/u w/ labs due to unknown time down currently <300  #maintain euvolemia           HEME/ONC:   #DVT prophylaxis    -SCDs    -Holding DVT prophylaxis as per primary team, will reassess 4/25 AM.   Labs: Hb/Hct:  15.2/48.2  -->                    Plts:  128  -->               PTT/INR:  34.1/1.19  --->     T&S Expires: 4/27    ID:  WBC- 9.70  --->>  Temp trend- 24hrs T(F): 97.8 (04-24 @ 08:17), Max: 97.8 (04-24 @ 08:17)  Current antibiotics- not indicated      ENDO:    -FSG ACHS    -Glucose goal 140-180    -if above 180 start ISS    MSK:     Activity - Out of bed with assistance      LINES/DRAINS:  PIV    ADVANCED DIRECTIVES:  Full Code    HCP/Emergency Contact- Martha (Wife) [phone]    INDICATION FOR SICU: Syncope and collapse, SDH q4 neurochecks    DISPO:   Case discussed with attending Dr. Nobles    Follow UP    Troponins 2 more sets    -f/u Amiodarone recs per EP    -f/u EPS outpuatient with Dr. laguerre for ablation eval    -f/u BNP    -f/u CK trend    -f/u ECHO

## 2023-04-24 NOTE — ED PROVIDER NOTE - CLINICAL SUMMARY MEDICAL DECISION MAKING FREE TEXT BOX
Patient presented status post syncopal episode as documented complaining primarily of posterior head pain.  Otherwise on arrival patient afebrile, hemodynamically stable, AAOx3, fully neurovascularly intact.  No tenderness or further signs of trauma anywhere on exam.  EKG obtained and grossly nonischemic.  Obtained labs which were grossly unremarkable including no significant leukocytosis, anemia, signs of dehydration/DO, transaminitis or significant electrolyte abnormalities.  Troponin negative.  Trauma work-up however revealed an acute subdural hematoma as well as scattered subarachnoid hemorrhages, no midline shift.  C-spine negative for acute fracture or dislocation and remainder pan scan without other acute traumatic injury.  Given CT findings, consulted neurosurgery and trauma will evaluate the patient in the ED.  Per neurosurgery, recommending Keppra and repeat head CT in 6 hours and they will follow.  Per trauma, recommending admission possibly to SICU for neurochecks.  Spoke with SICU attending Dr. Nobles who recommended admission to trauma at this time to follow-up repeat head CT results to determine if patient should go to SICU versus stepdown.  Trauma agreeable with plan for admission to their service.  Patient hemodynamically stable at time of admission.

## 2023-04-24 NOTE — ED PROVIDER NOTE - DIFFERENTIAL DIAGNOSIS
Differential Diagnosis Fall, r/o acute traumatic injury. Syncope r/o cardiac etiology vs dehydration vs electrolyte abnormality vs anemia vs other metabolic derangement.

## 2023-04-24 NOTE — ED ADULT NURSE NOTE - CAS EDP DISCH DISPOSITION ADMI
Denise returns today requesting a cortisone injection in the right knee. She has known severe osteoarthritis in both knees with significant loss of medial compartment space. Her right knee bothers her more than the left. She is scheduled to leave on vacation to the [facility] next week and would like to injection prior to her vacation. She continues with ibuprofen 6 are milligrams up to 3 times a day. She has been icing. Orthovisc has not helped her much in the past. Last cortisone in March injection helped her significantly for about 6-7 weeks.      Physical exam shows a pleasant, well-developed, well-nourished female in no acute distress.  Visit Vitals  Pulse 88   Wt 79.8 kg   LMP 10/28/2001   BMI 27.98 kg/m²      Knee range of motion is full. She does have a varus deformity of both knees. There is some mild reproducible medial joint line tenderness in the right knee. Babak's however is negative for pain or crepitus. Calves are nontender. Homans is negative. Lower extremity neurovascular status is intact.       IMPRESSION:  Severe osteoarthritis both knees worse on the right     PLAN:  Because she is leaving on vacation we decided to proceed with a cortisone injection today. After sterile prep 80 mg of Depo-Medrol with 3 cc 1% lidocaine were injected into the right knee from an anterolateral approach. She tolerated the procedure well without incident. She is hoping to get through a bit more time before having to consider total knee. We'll see how she does on vacation. She will call when she returns regarding her progress. It's a pleasure to participate in her care.       
ICU

## 2023-04-24 NOTE — CONSULT NOTE ADULT - SUBJECTIVE AND OBJECTIVE BOX
ZUNILDA DENNIS   [id number]   [birth year]  73yM    Admit Date: 04-24-23  Indication for SDU: s/p syncopal fall with b/l Frontal SDH, SAH along falx with no midline shift.        ============================  HPI   HPI:  73M PMH of CHF (EF 30-35% in 12/2022) with AICD ([facility] XT DR), CAD s/p PCI (x5 2006) s/p CABG 12/2022, HTN, COPD (no home O2) presenting s/p syncopal fall +HT, +LOC, -AC (on ASA 81mg). Patient states that he was running late to play golf today and was running with his clubs when he syncopized and fell backward. The fall was witnessed by bystanders and states that he lost consciousness for a few minutes. No loss of fecal or urinary continence. No confusion after episode. Patient only complaining of headache and cervical spinal tenderness. Patient has voided since in ED. Patient had no chest pain, dyspnea, +dizziness (felt the room spinning). Patient denies beginning new medications, and states he ate breakfast this AM.        24 Hour Events  -Admission under SICU service    [X] A ten-point review of systems was otherwise negative except as noted above.  [  ] Due to altered mental status/intubation, subjective information was not attained from the patient. History was obtained, to the extent possible, from review of the chart and collateral sources of information.    =========================================================================================================================================    PMH  PAST MEDICAL & SURGICAL HISTORY:  COPD (chronic obstructive pulmonary disease)  CAD (coronary artery disease)  Myocardial infarction  2006  CAD (coronary artery disease)  HAD CARDIAC CATH WITH STENTS 2006  Hypercholesteremia  HTN (hypertension)  CAD (coronary artery disease)  CARDIAC CATH 2006  AICD 2015  History of coronary artery stent placement        Home Meds:  Home Medications:  amLODIPine 5 mg oral tablet: 1 orally once a day (24 Apr 2023 11:40)  aspirin 81 mg oral tablet: 1 orally once a day (24 Apr 2023 11:40)  Entresto 24 mg-26 mg oral tablet: 1 orally 2 times a day (24 Apr 2023 11:40)  ezetimibe 10 mg oral tablet: 1 orally once a day (24 Apr 2023 11:40)  metoprolol succinate 50 mg oral tablet, extended release: 1 orally once a day (24 Apr 2023 11:40)  Ventolin HFA 90 mcg/inh inhalation aerosol: 2 puff(s) inhaled 4 times a day, As Needed (10 Feb 2020 17:27)     Allergies  Allergies    No Known Allergies    Intolerances       Current Medications:  acetaminophen     Tablet .. 650 milliGRAM(s) Oral every 6 hours PRN Temp greater or equal to 38C (100.4F), Mild Pain (1 - 3)  albuterol    90 MICROgram(s) HFA Inhaler 2 Puff(s) Inhalation every 6 hours PRN Shortness of Breath and/or Wheezing  amLODIPine   Tablet 5 milliGRAM(s) Oral daily  atorvastatin 40 milliGRAM(s) Oral at bedtime  furosemide    Tablet 40 milliGRAM(s) Oral daily  levETIRAcetam  IVPB 500 milliGRAM(s) IV Intermittent every 12 hours  metoprolol succinate ER 50 milliGRAM(s) Oral daily  pantoprazole    Tablet 40 milliGRAM(s) Oral before breakfast  sodium chloride 0.9%. 1000 milliLiter(s) (70 mL/Hr) IV Continuous <Continuous>      VITAL SIGNS, INS/OUTS (Last 24Hours)  ICU Vital Signs Last 24 Hrs  T(C): 36.6 (24 Apr 2023 08:17), Max: 36.6 (24 Apr 2023 08:17)  T(F): 97.8 (24 Apr 2023 08:17), Max: 97.8 (24 Apr 2023 08:17)  HR: 82 (24 Apr 2023 08:17) (82 - 82)  BP: 135/94 (24 Apr 2023 08:17) (135/94 - 135/94)  BP(mean): --  ABP: --  ABP(mean): --  RR: 18 (24 Apr 2023 08:17) (18 - 18)  SpO2: 99% (24 Apr 2023 08:17) (99% - 99%)    O2 Parameters below as of 24 Apr 2023 08:17  Patient On (Oxygen Delivery Method): room air      Physical Exam:   ----------------------------------------------------------------------------------------------------------  Exam: A&Ox3, no focal deficits. Equal strength and sensation b/l.   RESPIRATORY:  Normal expansion/effort  Saturating well on RA  CARDIOVASCULAR:  Non-tachycardic  No peripheral edema  GASTROINTESTINAL:  Abdomen soft, non-tender, non-distended  MUSCULOSKELETAL:  Extremities warm, pink, well-perfused. No cervical or spinal tenderness  DERM:  No skin breakdown. No bruising   :   Exam: Voiding     Tubes/Lines/Drains   ----------------------------------------------------------------------------------------------------------  [x] Peripheral IV  [ ] Urinary Catheter Oshea                                               [ ] Central Venous Line                   [ ] Arterial Line		       ZUNILDA DENNIS   [id number]   [birth year]  73yM    Admit Date: 04-24-23  Indication for SDU: s/p syncopal fall with b/l Frontal SDH, SAH along falx with no midline shift.        ============================  HPI   HPI:  73M PMH of CHF (EF 30-35% in 12/2022) with AICD ([facility] XT DR), CAD s/p PCI (x5 2006) s/p CABG 12/2022, HTN, COPD (no home O2) presenting s/p syncopal fall +HT, +LOC, -AC (on ASA 81mg). Patient states that he was running late to play golf today and was running with his clubs when he syncopized and fell backward. The fall was witnessed by bystanders and states that he lost consciousness for a few minutes. No loss of fecal or urinary continence. No confusion after episode. Patient only complaining of headache and cervical spinal tenderness. Patient has voided since in ED. Patient had no chest pain, dyspnea, +dizziness (felt the room spinning). SICU consult, seen and examined. Patient denies beginning new medications, and states he ate breakfast this AM. Patient states his cardiologist started him on an "anti-dizziness medication" but cannot remember the name and was unable to locate in home medication list. Patient has discontinued DAPT per cardiologist as stents were placed in 2001.         24 Hour Events  -Admission under SICU service    [X] A ten-point review of systems was otherwise negative except as noted above.  [  ] Due to altered mental status/intubation, subjective information was not attained from the patient. History was obtained, to the extent possible, from review of the chart and collateral sources of information.    =========================================================================================================================================    PMH  PAST MEDICAL & SURGICAL HISTORY:  COPD (chronic obstructive pulmonary disease)  CAD (coronary artery disease)  Myocardial infarction  2006  CAD (coronary artery disease)  HAD CARDIAC CATH WITH STENTS 2006  Hypercholesteremia  HTN (hypertension)  CAD (coronary artery disease)  CARDIAC CATH 2006  AICD 2015  History of coronary artery stent placement        Home Meds:  Home Medications:  amLODIPine 5 mg oral tablet: 1 orally once a day (24 Apr 2023 11:40)  aspirin 81 mg oral tablet: 1 orally once a day (24 Apr 2023 11:40)  Entresto 24 mg-26 mg oral tablet: 1 orally 2 times a day (24 Apr 2023 11:40)  ezetimibe 10 mg oral tablet: 1 orally once a day (24 Apr 2023 11:40)  metoprolol succinate 50 mg oral tablet, extended release: 1 orally once a day (24 Apr 2023 11:40)  Ventolin HFA 90 mcg/inh inhalation aerosol: 2 puff(s) inhaled 4 times a day, As Needed (10 Feb 2020 17:27)     Allergies  Allergies    No Known Allergies    Intolerances       Current Medications:  acetaminophen     Tablet .. 650 milliGRAM(s) Oral every 6 hours PRN Temp greater or equal to 38C (100.4F), Mild Pain (1 - 3)  albuterol    90 MICROgram(s) HFA Inhaler 2 Puff(s) Inhalation every 6 hours PRN Shortness of Breath and/or Wheezing  amLODIPine   Tablet 5 milliGRAM(s) Oral daily  atorvastatin 40 milliGRAM(s) Oral at bedtime  furosemide    Tablet 40 milliGRAM(s) Oral daily  levETIRAcetam  IVPB 500 milliGRAM(s) IV Intermittent every 12 hours  metoprolol succinate ER 50 milliGRAM(s) Oral daily  sodium chloride 0.9%. 1000 milliLiter(s) (70 mL/Hr) IV Continuous <Continuous>      VITAL SIGNS, INS/OUTS (Last 24Hours)  ICU Vital Signs Last 24 Hrs  T(C): 36.6 (24 Apr 2023 08:17), Max: 36.6 (24 Apr 2023 08:17)  T(F): 97.8 (24 Apr 2023 08:17), Max: 97.8 (24 Apr 2023 08:17)  HR: 82 (24 Apr 2023 08:17) (82 - 82)  BP: 135/94 (24 Apr 2023 08:17) (135/94 - 135/94)  BP(mean): --  ABP: --  ABP(mean): --  RR: 18 (24 Apr 2023 08:17) (18 - 18)  SpO2: 99% (24 Apr 2023 08:17) (99% - 99%)    O2 Parameters below as of 24 Apr 2023 08:17  Patient On (Oxygen Delivery Method): room air      Physical Exam:   ----------------------------------------------------------------------------------------------------------  Exam: A&Ox3, no focal deficits. Equal strength and sensation b/l.   RESPIRATORY:  Normal expansion/effort  Saturating well on RA  CARDIOVASCULAR:  Non-tachycardic  No peripheral edema  GASTROINTESTINAL:  Abdomen soft, non-tender, non-distended  MUSCULOSKELETAL:  Extremities warm, pink, well-perfused. No cervical or spinal tenderness  DERM:  No skin breakdown. No bruising   :   Exam: Voiding     Tubes/Lines/Drains   ----------------------------------------------------------------------------------------------------------  [x] Peripheral IV  [ ] Urinary Catheter Oshea                                               [ ] Central Venous Line                   [ ] Arterial Line		       ZUNILDA DENNIS   [id number]   [birth year]  73yM    Admit Date: 04-24-23  Indication for SDU: s/p syncopal fall with b/l Frontal SDH, SAH along falx with no midline shift.        ============================  HPI   73M PMH of CHF (EF 30-35% in 12/2022) with AICD ([facility] XT ), CAD s/p PCI (x5 2006) s/p CABG 12/2022, HTN, COPD (no home O2) presenting s/p syncopal fall +HT, +LOC, -AC (on ASA 81mg). Patient states that he was running late to play golf today and was running with his clubs when he syncopized and fell backward. The fall was witnessed by bystanders and states that he lost consciousness for a few minutes. No loss of fecal or urinary continence. No confusion after episode. Patient only complaining of headache and cervical spinal tenderness. Patient has voided since in ED. Patient had no chest pain, dyspnea, +dizziness (felt the room spinning).   Trauma workup revealed SDH/SAH on Head CT, rest of scans negative.    SICU consult, seen and examined. Patient denies beginning new medications, and states he ate breakfast this AM. Patient states his cardiologist started him on an "anti-dizziness medication" but cannot remember the name and was unable to locate in home medication list. Patient has discontinued DAPT per cardiologist as stents were placed in 2001.          Pertinent Imaging     CT Head No Cont (04.24.23 @ 09:48) >  Acute subdural hemorrhages along the cerebral falx and along the bifrontal convexities (right larger than left). No midline shift.  Scattered subarachnoid hemorrhages in the interhemispheric fissure and bilateral cerebral sulci as above.  Redemonstrated 2 cm planum sphenoidale meningioma with slightly increased peritumoral edema.  CT CERVICAL SPINE: negative  CT Chest/Abdomen/Pelvis: negative for acute pathology    -10 mm right lower lobe solid nodule seen on series 4 image 88. Malignancy is not excluded. Recommend follow-up PET/CT.    -Right basilar ill-defined groundglass density versus reticulation with some focal bronchiolectasis. This could represent an early interstitial lung abnormality versus postinfectious/inflammatory change.    -Stable 2.0 cm right interpolar renal lesion measuring higher than simple fluid attenuation. Differential includes a hemorrhagic cyst or solid mass. Recommend outpatient ultrasound when possible.       24 Hour Events  -Admission under SICU service    [X] A ten-point review of systems was otherwise negative except as noted above.  [  ] Due to altered mental status/intubation, subjective information was not attained from the patient. History was obtained, to the extent possible, from review of the chart and collateral sources of information.    =========================================================================================================================================    PMH  PAST MEDICAL & SURGICAL HISTORY:  COPD (chronic obstructive pulmonary disease)  CAD (coronary artery disease)  Myocardial infarction  2006  CAD (coronary artery disease)  HAD CARDIAC CATH WITH STENTS 2006  Hypercholesteremia  HTN (hypertension)  CAD (coronary artery disease)  CARDIAC CATH 2006  AICD 2015  History of coronary artery stent placement        Home Meds:  Home Medications:  amLODIPine 5 mg oral tablet: 1 orally once a day (24 Apr 2023 11:40)  aspirin 81 mg oral tablet: 1 orally once a day (24 Apr 2023 11:40)  Entresto 24 mg-26 mg oral tablet: 1 orally 2 times a day (24 Apr 2023 11:40)  ezetimibe 10 mg oral tablet: 1 orally once a day (24 Apr 2023 11:40)  metoprolol succinate 50 mg oral tablet, extended release: 1 orally once a day (24 Apr 2023 11:40)  Ventolin HFA 90 mcg/inh inhalation aerosol: 2 puff(s) inhaled 4 times a day, As Needed (10 Feb 2020 17:27)     Allergies  Allergies    No Known Allergies    Intolerances       Current Medications:  acetaminophen     Tablet .. 650 milliGRAM(s) Oral every 6 hours PRN Temp greater or equal to 38C (100.4F), Mild Pain (1 - 3)  albuterol    90 MICROgram(s) HFA Inhaler 2 Puff(s) Inhalation every 6 hours PRN Shortness of Breath and/or Wheezing  amLODIPine   Tablet 5 milliGRAM(s) Oral daily  atorvastatin 40 milliGRAM(s) Oral at bedtime  furosemide    Tablet 40 milliGRAM(s) Oral daily  levETIRAcetam  IVPB 500 milliGRAM(s) IV Intermittent every 12 hours  metoprolol succinate ER 50 milliGRAM(s) Oral daily  sodium chloride 0.9%. 1000 milliLiter(s) (70 mL/Hr) IV Continuous <Continuous>      VITAL SIGNS, INS/OUTS (Last 24Hours)  ICU Vital Signs Last 24 Hrs  T(C): 36.6 (24 Apr 2023 08:17), Max: 36.6 (24 Apr 2023 08:17)  T(F): 97.8 (24 Apr 2023 08:17), Max: 97.8 (24 Apr 2023 08:17)  HR: 82 (24 Apr 2023 08:17) (82 - 82)  BP: 135/94 (24 Apr 2023 08:17) (135/94 - 135/94)  BP(mean): --  ABP: --  ABP(mean): --  RR: 18 (24 Apr 2023 08:17) (18 - 18)  SpO2: 99% (24 Apr 2023 08:17) (99% - 99%)    O2 Parameters below as of 24 Apr 2023 08:17  Patient On (Oxygen Delivery Method): room air      Physical Exam:   ----------------------------------------------------------------------------------------------------------  Exam: A&Ox3, no focal deficits. Equal strength and sensation b/l.   RESPIRATORY:  Normal expansion/effort  Saturating well on RA  CARDIOVASCULAR:  Non-tachycardic  No peripheral edema  GASTROINTESTINAL:  Abdomen soft, non-tender, non-distended  MUSCULOSKELETAL:  Extremities warm, pink, well-perfused. No cervical or spinal tenderness  DERM:  No skin breakdown. No bruising   :   Exam: Voiding     Tubes/Lines/Drains   ----------------------------------------------------------------------------------------------------------  [x] Peripheral IV  [ ] Urinary Catheter Oshea                                               [ ] Central Venous Line                   [ ] Arterial Line

## 2023-04-24 NOTE — ED PROVIDER NOTE - CARE PLAN
1 Principal Discharge DX:	Syncope  Secondary Diagnosis:	Traumatic subdural hematoma with loss of consciousness  Secondary Diagnosis:	SAH (subarachnoid hemorrhage)

## 2023-04-24 NOTE — CONSULT NOTE ADULT - SUBJECTIVE AND OBJECTIVE BOX
HPI:  73M PMH of CHF (EF 30-35% in 12/2022) with AICD ([facility] XT ), CAD s/p PCI (x5 2006) s/p CABG 12/2022, HTN, COPD (no home O2) presenting s/p syncopal fall +HT, +LOC, -AC (on ASA 81mg). Patient states that he was running late to play golf today and was running with his clubs when he syncopized and fell backward. The fall was witnessed by bystanders and states that he lost consciousness for a few minutes. No loss of fecal or urinary continence. No confusion after episode. Patient only complaining of headache and cervical spinal tenderness. Patient had no chest pain, dyspnea, +dizziness (felt the room spinning)     Imaging:  < from: CT Head No Cont (04.24.23 @ 09:48) >  CT HEAD:  Acute subdural hemorrhages along the cerebral falx and along the   bifrontal convexities (right larger than left). No midline shift.    Scattered subarachnoid hemorrhages in the interhemispheric fissure and   bilateral cerebral sulci as above.    Redemonstrated 2 cm planum sphenoidale meningioma with slightly increased   peritumoral edema.    CT CERVICAL SPINE:  No acute cervical fracture or facet subluxation.      #acute frontal SDH/SAH    -HCT x2 stable bleed    -NSGY c/s recs: q4 neurochecks ,SBP <150, HOB elevated    -Keppra 500mg q12    Medical charts / labs / imaging studies reviewed       PAST MEDICAL & SURGICAL HISTORY:  COPD (chronic obstructive pulmonary disease)      CAD (coronary artery disease)      Myocardial infarction  2006      CAD (coronary artery disease)  HAD CARDIAC CATH WITH STENTS 2006      Hypercholesteremia      HTN (hypertension)      CAD (coronary artery disease)  CARDIAC CATH 2006  AICD 2015      History of coronary artery stent placement          Hospital Course:    TODAY'S SUBJECTIVE & REVIEW OF SYMPTOMS:     Constitutional WNL   Cardio WNL   Resp WNL   GI WNL  Heme WNL  Endo WNL  Skin WNL  MSK WNL  Neuro syncope / HA / dizziness   Cognitive WNL  Psych WNL      MEDICATIONS  (STANDING):  atorvastatin 40 milliGRAM(s) Oral at bedtime  furosemide    Tablet 40 milliGRAM(s) Oral daily  levETIRAcetam  IVPB 500 milliGRAM(s) IV Intermittent every 12 hours  senna 2 Tablet(s) Oral at bedtime  sodium chloride 0.9%. 1000 milliLiter(s) (70 mL/Hr) IV Continuous <Continuous>    MEDICATIONS  (PRN):  acetaminophen     Tablet .. 650 milliGRAM(s) Oral every 6 hours PRN Temp greater or equal to 38C (100.4F), Mild Pain (1 - 3)  albuterol    90 MICROgram(s) HFA Inhaler 2 Puff(s) Inhalation every 6 hours PRN Shortness of Breath and/or Wheezing      FAMILY HISTORY:  Stroke (Father)        Allergies    No Known Allergies    Intolerances        SOCIAL HISTORY:    [  ] Etoh  [  ] Smoking  [  ] Substance abuse     Home Environment:  [   ] Home Alone  [  x ] Lives with Family  [   ] Home Health Aid    Dwelling:  [   ] Apartment  [ x  ] Private House  [   ] Adult Home  [   ] Skilled Nursing Facility      [   ] Short Term  [   ] Long Term  [  x ] Stairs       Elevator [   ]    FUNCTIONAL STATUS PTA: (Check all that apply)  Ambulation: [  x  ]Independent    [   ] Dependent     [   ] Non-Ambulatory  Assistive Device: [   ] SA Cane  [   ]  Q Cane  [   ] Walker  [   ]  Wheelchair  ADL : [  x ] Independent  [    ]  Dependent       Vital Signs Last 24 Hrs  T(C): 36.3 (24 Apr 2023 16:14), Max: 36.6 (24 Apr 2023 08:17)  T(F): 97.4 (24 Apr 2023 16:14), Max: 97.8 (24 Apr 2023 08:17)  HR: 72 (24 Apr 2023 17:12) (69 - 82)  BP: 95/63 (24 Apr 2023 17:12) (84/50 - 135/94)  BP(mean): --  RR: 18 (24 Apr 2023 17:12) (18 - 18)  SpO2: 98% (24 Apr 2023 17:12) (98% - 99%)    Parameters below as of 24 Apr 2023 17:12  Patient On (Oxygen Delivery Method): room air          PHYSICAL EXAM: Awake & Alert  GENERAL: NAD  HEAD:  Normocephalic  CHEST/LUNG: Clear   HEART: S1S2+  ABDOMEN: Soft, Nontender  EXTREMITIES:  no calf tenderness    NERVOUS SYSTEM:  Cranial Nerves 2-12 intact [   ] Abnormal  [   ]  ROM: WFL all extremities [ x  ]  Abnormal [   ]  Motor Strength: WFL all extremities  [ x  ]  Abnormal [   ]  Sensation: intact to light touch [ x  ] Abnormal [   ]    FUNCTIONAL STATUS:  Bed Mobility: Independent [   ]  Supervision [   ]  Needs Assistance [ x  ]  N/A [   ]  Transfers: Independent [   ]  Supervision [   ]  Needs Assistance [   ]  N/A [   ]   Ambulation: Independent [   ]  Supervision [   ]  Needs Assistance [   ]  N/A [   ]  ADL: Independent [   ] Requires Assistance [   ] N/A [   ]      LABS:                        15.2   9.70  )-----------( 128      ( 24 Apr 2023 09:30 )             48.2     04-24    142  |  106  |  34<H>  ----------------------------<  66<L>  4.0   |  26  |  1.7<H>    Ca    9.6      24 Apr 2023 09:30    TPro  7.1  /  Alb  4.2  /  TBili  0.3  /  DBili  x   /  AST  23  /  ALT  15  /  AlkPhos  63  04-24    PT/INR - ( 24 Apr 2023 10:50 )   PT: 13.60 sec;   INR: 1.19 ratio         PTT - ( 24 Apr 2023 10:50 )  PTT:34.1 sec      RADIOLOGY & ADDITIONAL STUDIES:

## 2023-04-25 LAB
ALBUMIN SERPL ELPH-MCNC: 3.9 G/DL — SIGNIFICANT CHANGE UP (ref 3.5–5.2)
ALP SERPL-CCNC: 56 U/L — SIGNIFICANT CHANGE UP (ref 30–115)
ALT FLD-CCNC: 10 U/L — SIGNIFICANT CHANGE UP (ref 0–41)
ANION GAP SERPL CALC-SCNC: 11 MMOL/L — SIGNIFICANT CHANGE UP (ref 7–14)
ANION GAP SERPL CALC-SCNC: 11 MMOL/L — SIGNIFICANT CHANGE UP (ref 7–14)
APPEARANCE UR: CLEAR — SIGNIFICANT CHANGE UP
AST SERPL-CCNC: 17 U/L — SIGNIFICANT CHANGE UP (ref 0–41)
BILIRUB DIRECT SERPL-MCNC: 0.2 MG/DL — SIGNIFICANT CHANGE UP (ref 0–0.3)
BILIRUB INDIRECT FLD-MCNC: 0.6 MG/DL — SIGNIFICANT CHANGE UP (ref 0.2–1.2)
BILIRUB SERPL-MCNC: 0.8 MG/DL — SIGNIFICANT CHANGE UP (ref 0.2–1.2)
BILIRUB UR-MCNC: NEGATIVE — SIGNIFICANT CHANGE UP
BUN SERPL-MCNC: 21 MG/DL — HIGH (ref 10–20)
BUN SERPL-MCNC: 22 MG/DL — HIGH (ref 10–20)
CALCIUM SERPL-MCNC: 8.8 MG/DL — SIGNIFICANT CHANGE UP (ref 8.4–10.5)
CALCIUM SERPL-MCNC: 9.2 MG/DL — SIGNIFICANT CHANGE UP (ref 8.4–10.5)
CHLORIDE SERPL-SCNC: 109 MMOL/L — SIGNIFICANT CHANGE UP (ref 98–110)
CHLORIDE SERPL-SCNC: 109 MMOL/L — SIGNIFICANT CHANGE UP (ref 98–110)
CK MB CFR SERPL CALC: 6.1 NG/ML — SIGNIFICANT CHANGE UP (ref 0.6–6.3)
CK SERPL-CCNC: 136 U/L — SIGNIFICANT CHANGE UP (ref 0–225)
CK SERPL-CCNC: 144 U/L — SIGNIFICANT CHANGE UP (ref 0–225)
CO2 SERPL-SCNC: 18 MMOL/L — SIGNIFICANT CHANGE UP (ref 17–32)
CO2 SERPL-SCNC: 20 MMOL/L — SIGNIFICANT CHANGE UP (ref 17–32)
COLOR SPEC: YELLOW — SIGNIFICANT CHANGE UP
CREAT ?TM UR-MCNC: 150 MG/DL — SIGNIFICANT CHANGE UP
CREAT SERPL-MCNC: 1.2 MG/DL — SIGNIFICANT CHANGE UP (ref 0.7–1.5)
CREAT SERPL-MCNC: 1.3 MG/DL — SIGNIFICANT CHANGE UP (ref 0.7–1.5)
DIFF PNL FLD: NEGATIVE — SIGNIFICANT CHANGE UP
EGFR: 58 ML/MIN/1.73M2 — LOW
EGFR: 63 ML/MIN/1.73M2 — SIGNIFICANT CHANGE UP
GLUCOSE SERPL-MCNC: 86 MG/DL — SIGNIFICANT CHANGE UP (ref 70–99)
GLUCOSE SERPL-MCNC: 99 MG/DL — SIGNIFICANT CHANGE UP (ref 70–99)
GLUCOSE UR QL: NEGATIVE — SIGNIFICANT CHANGE UP
HCT VFR BLD CALC: 44.6 % — SIGNIFICANT CHANGE UP (ref 42–52)
HCT VFR BLD CALC: 46.5 % — SIGNIFICANT CHANGE UP (ref 42–52)
HGB BLD-MCNC: 14.2 G/DL — SIGNIFICANT CHANGE UP (ref 14–18)
HGB BLD-MCNC: 14.8 G/DL — SIGNIFICANT CHANGE UP (ref 14–18)
KETONES UR-MCNC: SIGNIFICANT CHANGE UP
LEUKOCYTE ESTERASE UR-ACNC: NEGATIVE — SIGNIFICANT CHANGE UP
MAGNESIUM SERPL-MCNC: 1.9 MG/DL — SIGNIFICANT CHANGE UP (ref 1.8–2.4)
MAGNESIUM SERPL-MCNC: 2 MG/DL — SIGNIFICANT CHANGE UP (ref 1.8–2.4)
MCHC RBC-ENTMCNC: 26.3 PG — LOW (ref 27–31)
MCHC RBC-ENTMCNC: 26.4 PG — LOW (ref 27–31)
MCHC RBC-ENTMCNC: 31.8 G/DL — LOW (ref 32–37)
MCHC RBC-ENTMCNC: 31.8 G/DL — LOW (ref 32–37)
MCV RBC AUTO: 82.7 FL — SIGNIFICANT CHANGE UP (ref 80–94)
MCV RBC AUTO: 83 FL — SIGNIFICANT CHANGE UP (ref 80–94)
NITRITE UR-MCNC: NEGATIVE — SIGNIFICANT CHANGE UP
NRBC # BLD: 0 /100 WBCS — SIGNIFICANT CHANGE UP (ref 0–0)
NRBC # BLD: 0 /100 WBCS — SIGNIFICANT CHANGE UP (ref 0–0)
OSMOLALITY UR: 690 MOS/KG — SIGNIFICANT CHANGE UP (ref 50–1200)
PH UR: 6 — SIGNIFICANT CHANGE UP (ref 5–8)
PHOSPHATE SERPL-MCNC: 2.5 MG/DL — SIGNIFICANT CHANGE UP (ref 2.1–4.9)
PHOSPHATE SERPL-MCNC: 2.7 MG/DL — SIGNIFICANT CHANGE UP (ref 2.1–4.9)
PLATELET # BLD AUTO: 127 K/UL — LOW (ref 130–400)
PLATELET # BLD AUTO: 129 K/UL — LOW (ref 130–400)
PMV BLD: 11 FL — HIGH (ref 7.4–10.4)
PMV BLD: 11.3 FL — HIGH (ref 7.4–10.4)
POTASSIUM SERPL-MCNC: 4.7 MMOL/L — SIGNIFICANT CHANGE UP (ref 3.5–5)
POTASSIUM SERPL-MCNC: 4.8 MMOL/L — SIGNIFICANT CHANGE UP (ref 3.5–5)
POTASSIUM SERPL-SCNC: 4.7 MMOL/L — SIGNIFICANT CHANGE UP (ref 3.5–5)
POTASSIUM SERPL-SCNC: 4.8 MMOL/L — SIGNIFICANT CHANGE UP (ref 3.5–5)
POTASSIUM UR-SCNC: 48 MMOL/L — SIGNIFICANT CHANGE UP
PROT SERPL-MCNC: 6.5 G/DL — SIGNIFICANT CHANGE UP (ref 6–8)
PROT UR-MCNC: SIGNIFICANT CHANGE UP
RBC # BLD: 5.39 M/UL — SIGNIFICANT CHANGE UP (ref 4.7–6.1)
RBC # BLD: 5.6 M/UL — SIGNIFICANT CHANGE UP (ref 4.7–6.1)
RBC # FLD: 17.8 % — HIGH (ref 11.5–14.5)
RBC # FLD: 18.3 % — HIGH (ref 11.5–14.5)
SODIUM SERPL-SCNC: 138 MMOL/L — SIGNIFICANT CHANGE UP (ref 135–146)
SODIUM SERPL-SCNC: 140 MMOL/L — SIGNIFICANT CHANGE UP (ref 135–146)
SODIUM UR-SCNC: 59 MMOL/L — SIGNIFICANT CHANGE UP
SP GR SPEC: 1.02 — SIGNIFICANT CHANGE UP (ref 1.01–1.03)
T3 SERPL-MCNC: 98 NG/DL — SIGNIFICANT CHANGE UP (ref 80–200)
T4 AB SER-ACNC: 6.1 UG/DL — SIGNIFICANT CHANGE UP (ref 4.6–12)
TROPONIN T SERPL-MCNC: <0.01 NG/ML — SIGNIFICANT CHANGE UP
TSH SERPL-MCNC: 0.89 UIU/ML — SIGNIFICANT CHANGE UP (ref 0.27–4.2)
UROBILINOGEN FLD QL: SIGNIFICANT CHANGE UP
UUN UR-MCNC: 1207 MG/DL — SIGNIFICANT CHANGE UP
WBC # BLD: 9.39 K/UL — SIGNIFICANT CHANGE UP (ref 4.8–10.8)
WBC # BLD: 9.85 K/UL — SIGNIFICANT CHANGE UP (ref 4.8–10.8)
WBC # FLD AUTO: 9.39 K/UL — SIGNIFICANT CHANGE UP (ref 4.8–10.8)
WBC # FLD AUTO: 9.85 K/UL — SIGNIFICANT CHANGE UP (ref 4.8–10.8)

## 2023-04-25 PROCEDURE — 99291 CRITICAL CARE FIRST HOUR: CPT | Mod: 24,25

## 2023-04-25 PROCEDURE — 70450 CT HEAD/BRAIN W/O DYE: CPT | Mod: 26

## 2023-04-25 PROCEDURE — 99222 1ST HOSP IP/OBS MODERATE 55: CPT

## 2023-04-25 PROCEDURE — 93306 TTE W/DOPPLER COMPLETE: CPT | Mod: 26

## 2023-04-25 PROCEDURE — 99232 SBSQ HOSP IP/OBS MODERATE 35: CPT | Mod: GC

## 2023-04-25 PROCEDURE — 71045 X-RAY EXAM CHEST 1 VIEW: CPT | Mod: 26

## 2023-04-25 RX ORDER — AMIODARONE HYDROCHLORIDE 400 MG/1
200 TABLET ORAL EVERY 12 HOURS
Refills: 0 | Status: DISCONTINUED | OUTPATIENT
Start: 2023-04-25 | End: 2023-04-25

## 2023-04-25 RX ORDER — AMIODARONE HYDROCHLORIDE 400 MG/1
200 TABLET ORAL EVERY 12 HOURS
Refills: 0 | Status: DISCONTINUED | OUTPATIENT
Start: 2023-04-25 | End: 2023-04-26

## 2023-04-25 RX ORDER — AMIODARONE HYDROCHLORIDE 400 MG/1
200 TABLET ORAL DAILY
Refills: 0 | Status: CANCELLED | OUTPATIENT
Start: 2023-05-03 | End: 2023-04-26

## 2023-04-25 RX ORDER — HEPARIN SODIUM 5000 [USP'U]/ML
5000 INJECTION INTRAVENOUS; SUBCUTANEOUS EVERY 8 HOURS
Refills: 0 | Status: DISCONTINUED | OUTPATIENT
Start: 2023-04-25 | End: 2023-04-26

## 2023-04-25 RX ORDER — CHLORHEXIDINE GLUCONATE 213 G/1000ML
1 SOLUTION TOPICAL
Refills: 0 | Status: DISCONTINUED | OUTPATIENT
Start: 2023-04-25 | End: 2023-04-26

## 2023-04-25 RX ADMIN — HEPARIN SODIUM 5000 UNIT(S): 5000 INJECTION INTRAVENOUS; SUBCUTANEOUS at 14:23

## 2023-04-25 RX ADMIN — AMIODARONE HYDROCHLORIDE 200 MILLIGRAM(S): 400 TABLET ORAL at 17:34

## 2023-04-25 RX ADMIN — ATORVASTATIN CALCIUM 40 MILLIGRAM(S): 80 TABLET, FILM COATED ORAL at 21:05

## 2023-04-25 RX ADMIN — HEPARIN SODIUM 5000 UNIT(S): 5000 INJECTION INTRAVENOUS; SUBCUTANEOUS at 21:05

## 2023-04-25 RX ADMIN — SODIUM CHLORIDE 70 MILLILITER(S): 9 INJECTION INTRAMUSCULAR; INTRAVENOUS; SUBCUTANEOUS at 20:15

## 2023-04-25 RX ADMIN — LEVETIRACETAM 500 MILLIGRAM(S): 250 TABLET, FILM COATED ORAL at 05:06

## 2023-04-25 RX ADMIN — AMIODARONE HYDROCHLORIDE 16.7 MG/MIN: 400 TABLET ORAL at 02:09

## 2023-04-25 RX ADMIN — Medication 63.75 MILLIMOLE(S): at 23:28

## 2023-04-25 RX ADMIN — LEVETIRACETAM 500 MILLIGRAM(S): 250 TABLET, FILM COATED ORAL at 17:34

## 2023-04-25 RX ADMIN — Medication 50 MILLIGRAM(S): at 05:06

## 2023-04-25 NOTE — CONSULT NOTE ADULT - SUBJECTIVE AND OBJECTIVE BOX
HPI:  73M PMH of CHF (EF 30-35% in 12/2022) with AICD ([facility] MRI XT ), CAD s/p PCI (x5 2006) s/p CABG 12/2022, HTN, COPD (no home O2) presenting s/p syncopal fall +HT, +LOC, -AC (on ASA 81mg). Patient states that he was running late to play golf today and was running with his clubs when he syncopized and fell backward. The fall was witnessed by bystanders and states that he lost consciousness for a few minutes. No loss of fecal or urinary continence. No confusion after episode. Patient only complaining of headache and cervical spinal tenderness. Patient had no chest pain, dyspnea, +dizziness (felt the room spinning)     Pt seen and examined at bedside. Pt presented for syncopal episode. Pt was a code trauma found to have subarachnoid and subdural hematoma. AICD interrogated pt found to have VT s/p shock. Echo shows EF 25% with new echogenic structure likely LV thrombus.     PAST MEDICAL & SURGICAL HISTORY  COPD (chronic obstructive pulmonary disease)    CAD (coronary artery disease)    Myocardial infarction  2006    CAD (coronary artery disease)  HAD CARDIAC CATH WITH STENTS 2006    Hypercholesteremia    HTN (hypertension)    CAD (coronary artery disease)  CARDIAC CATH 2006  AICD 2015    History of coronary artery stent placement        FAMILY HISTORY:  FAMILY HISTORY:  Stroke (Father)        SOCIAL HISTORY:  []smoker  []Alcohol  []Drug    ALLERGIES:  No Known Allergies      MEDICATIONS:  MEDICATIONS  (STANDING):  aMIOdarone    Tablet 200 milliGRAM(s) Oral every 12 hours  aMIOdarone Infusion 0.5 mG/Min (16.7 mL/Hr) IV Continuous <Continuous>  aMIOdarone IVPB 150 milliGRAM(s) IV Intermittent once  atorvastatin 40 milliGRAM(s) Oral at bedtime  chlorhexidine 2% Cloths 1 Application(s) Topical <User Schedule>  heparin   Injectable 5000 Unit(s) SubCutaneous every 8 hours  levETIRAcetam 500 milliGRAM(s) Oral two times a day  metoprolol succinate ER 50 milliGRAM(s) Oral daily  senna 2 Tablet(s) Oral at bedtime  sodium chloride 0.9%. 1000 milliLiter(s) (70 mL/Hr) IV Continuous <Continuous>    MEDICATIONS  (PRN):  acetaminophen     Tablet .. 650 milliGRAM(s) Oral every 6 hours PRN Temp greater or equal to 38C (100.4F), Mild Pain (1 - 3)  albuterol    90 MICROgram(s) HFA Inhaler 2 Puff(s) Inhalation every 6 hours PRN Shortness of Breath and/or Wheezing      HOME MEDICATIONS:  Home Medications:  amLODIPine 5 mg oral tablet: 1 orally once a day (24 Apr 2023 11:40)  aspirin 81 mg oral tablet: 1 orally once a day (24 Apr 2023 11:40)  Entresto 24 mg-26 mg oral tablet: 1 orally 2 times a day (24 Apr 2023 11:40)  ezetimibe 10 mg oral tablet: 1 orally once a day (24 Apr 2023 11:40)  metoprolol succinate 50 mg oral tablet, extended release: 1 orally once a day (24 Apr 2023 11:40)  Ventolin HFA 90 mcg/inh inhalation aerosol: 2 puff(s) inhaled 4 times a day, As Needed (10 Feb 2020 17:27)      VITALS:   T(F): 97.7 (04-25 @ 07:58), Max: 97.8 (04-24 @ 08:17)  HR: 71 (04-25 @ 14:00) (69 - 103)  BP: 126/67 (04-25 @ 14:00) (84/50 - 145/73)  BP(mean): 90 (04-25 @ 14:00) (86 - 106)  RR: 18 (04-25 @ 14:00) (16 - 31)  SpO2: 97% (04-25 @ 14:00) (91% - 99%)    I&O's Summary    24 Apr 2023 07:01  -  25 Apr 2023 07:00  --------------------------------------------------------  IN: 1210 mL / OUT: 570 mL / NET: 640 mL    25 Apr 2023 07:01  -  25 Apr 2023 14:56  --------------------------------------------------------  IN: 606.9 mL / OUT: 250 mL / NET: 356.9 mL        REVIEW OF SYSTEMS:  CONSTITUTIONAL: No weakness, fevers or chills  EYES: No visual changes  ENT: No vertigo or throat pain   NECK: No pain or stiffness  RESPIRATORY: No cough, wheezing, hemoptysis; No shortness of breath  CARDIOVASCULAR: No chest pain or palpitations  GASTROINTESTINAL: No abdominal or epigastric pain. No nausea, vomiting, or hematemesis; No diarrhea or constipation. No melena or hematochezia.  GENITOURINARY: No dysuria, frequency or hematuria  NEUROLOGICAL: No numbness or weakness  SKIN: No itching, no rashes  MSK: no    PHYSICAL EXAM:  NEURO: patient is awake , alert and oriented  GEN: Not in acute distress  NECK: no thyroid enlargement, no JVD  LUNGS: Clear to auscultation bilaterally   CARDIOVASCULAR: S1/S2 present, RRR , no murmurs or rubs, no carotid bruits,  + PP bilaterally  ABD: Soft, non-tender, non-distended, +BS  EXT: No GABE  SKIN: Intact    LABS:                        14.8   9.39  )-----------( 127      ( 25 Apr 2023 05:35 )             46.5     04-25    140  |  109  |  22<H>  ----------------------------<  99  4.8   |  20  |  1.3    Ca    9.2      25 Apr 2023 05:35  Phos  2.5     04-25  Mg     1.9     04-25    TPro  6.5  /  Alb  3.9  /  TBili  0.8  /  DBili  0.2  /  AST  17  /  ALT  10  /  AlkPhos  56  04-25    PT/INR - ( 24 Apr 2023 10:50 )   PT: 13.60 sec;   INR: 1.19 ratio         PTT - ( 24 Apr 2023 10:50 )  PTT:34.1 sec  Creatine Kinase, Serum: 136 U/L (04-25-23 @ 05:35)  Creatine Kinase, Serum: 144 U/L (04-25-23 @ 02:55)  Troponin T, Serum: <0.01 ng/mL (04-25-23 @ 02:55)  Creatine Kinase, Serum: 220 U/L (04-24-23 @ 17:18)  Troponin T, Serum: <0.01 ng/mL (04-24-23 @ 17:18)    CARDIAC MARKERS ( 25 Apr 2023 05:35 )  x     / x     / 136 U/L / x     / x      CARDIAC MARKERS ( 25 Apr 2023 02:55 )  x     / <0.01 ng/mL / 144 U/L / x     / 6.1 ng/mL  CARDIAC MARKERS ( 24 Apr 2023 17:18 )  x     / <0.01 ng/mL / 220 U/L / x     / 8.7 ng/mL  CARDIAC MARKERS ( 24 Apr 2023 10:50 )  x     / x     / 227 U/L / x     / 8.7 ng/mL  CARDIAC MARKERS ( 24 Apr 2023 09:30 )  x     / <0.01 ng/mL / x     / x     / x            Troponin trend:            RADIOLOGY:  -CXR:  -TTE:  < from: TTE Echo Complete w/ Contrast w/ Doppler (04.25.23 @ 09:31) >   1. Left ventricular ejectionfraction, by visual estimation, is 20 to   25%.   2. Severely decreased global left ventricular systolic function.   3. Multiple left ventricular regional wall motion abnormalities exist.   See wall motion findings.   4. Endocardial visualization wasenhanced with intravenous echo contrast.   5. 2.5 x 1.3 cm filling defect on the contrast enhanced images   consistent with LV apical thrombus.   6. Mildly enlarged right ventricle.   7. Mildly reduced RV systolic function.   8. Trace mitral valve regurgitation.   9. Normal left atrial size.  10. Mildly enlarged right atrium.  11. Findings discussed with DEBBIE Leija 4/25/23 at 12:27 PM.    < end of copied text >    -CCTA:  -STRESS TEST:  -CATHETERIZATION:  Coronary Dominance: Right dominate      LM: Mild Disease    LAD: Mild prox disease. 90% stenosis in mid LAD. 99% in stent stenosis distal LAD. D1 90% lesion, D2 99% lesion at the ostium. RCA supplied collateral to Apical LAD and diag    CX: Prox Moderate in-stent diffuse disease, 90% calcified lesion in distal Lcx. Moderate disease OM1    RCA: Mild diffuse disease       LVEDP: 9 mmHg     ECG:  Atrial paced rhythm  TELEMETRY EVENTS:

## 2023-04-25 NOTE — PROGRESS NOTE ADULT - ASSESSMENT
Assessment/Plan	  73y Male  s/p syncopal fall with b/l Frontal SDH, SAH along falx with no midline shift.  HCT x2 stable bleed    NEURO:  #acute frontal SDH/SAH    -HCT x2 stable bleed, pending CT at 4/25 AM    -NSGY c/s recs: q4 neurochecks ,SBP <150, HOB elevated    -Keppra 500mg q12  #acute pain    -acetaminophen Tablet .. 650 milliGRAM(s) Oral every 6 hours PRN Temp greater or equal to 38C (100.4F), Mild Pain (1 - 3)     RESP:   #hx of COPD (no home o2)    -Albuterol     -maintain saturation >90%    #Activity    - Ambulate with assistance     CARDS:   #Acute syncopal workup pending    -Obtain orthostatic vitals, Echo  #acute episode of VTach on AICD interrogation    -episodes of SVT/Vtach seen on device terminated by shock x2, device functioning properly  Patient started on Amio as per EP recs    -EP c/s recs: Start Amiodarone, 150mg bolus f/b 1mg/min x 6 hours then 0.5mg/min x 18 hours per EP. When Amio drip completed, start PO 200mg BID     -F/u with Dr. Laguerre in 1month for eval for for outpatient EP study/ablation once patient can tolerate AC (currently only on ASA)  #Hx of MI (2006), CAD, Stents placed in 2006 AICD 2015,     -Metoprolol 50mg XL    -ASA 81mg holding per Neuro sx  #hx of CHF (EF 30-35%)    -Entresto HOLDING secondary to DO as per primary team    -Furosemide 20mg holding in setting of DO  #hx of HTN    -Amlodipine 5mg HOLDING until SBP stable  #hx of HLD    -Atorvastatin 40mg    -Ezetimibe 10mg   #Imaging  - EKG in ED shows sinus tachycardia with occasional Premature ventricular complexes  - TTE Echo with contrast (12/2021):  Severely decreased global left ventricular systolic function, multiple    -Repeat ECHO pending  #Labs    -Trop neg x2 (f/u 1 more sets)    GI/NUTR:   #Regular diet    -aspiration precautions, HOB 30  #GI Prophylaxis    -not indicated  #Bowel regimen    -Not indicated, senna prn    /RENAL:   #voiding     Labs:          BUN/Cr- 34/1.7  -->,  29/1.4  -->          Electrolytes-Na 138 // K 4.9 // Mg 2.1 //  Phos 3.1 (04-24 @ 17:18)  #DO (baseline Cr 0.9)  -Continue with maintenance fluid  - Trend CK f/u w/ labs due to unknown time down currently <300  #maintain euvolemia           HEME/ONC:   #DVT prophylaxis    -SCDs    -Holding DVT prophylaxis as per primary team, will reassess 4/25 AM.    - As per neuro sx hold all AC/AP x2 weeks    Labs: Hb/Hct:  15.2/48.2  -->,  14.3/45.2  -->                      Plts:  128  -->,  122  -->                 PTT/INR:  34.1/1.19  --->     T&S Expires: 4/27    ID:  WBC- 9.70  --->>,  9.95  --->>  Temp trend- 24hrs T(F): 97.6 (04-24 @ 20:00), Max: 97.8 (04-24 @ 08:17)  Current antibiotics- not indicated    ENDO:    -FSG ACHS    -Glucose goal 140-180    -if above 180 start ISS    MSK:     Activity - Out of bed with assistance    LINES/DRAINS:  PIV    ADVANCED DIRECTIVES:  Full Code    HCP/Emergency Contact- Martha (Wife) [phone]    INDICATION FOR SDU: Syncope and collapse, SDH q4 neurochecks    DISPO:   Case discussed with attending Dr. Nobles Assessment/Plan	  73y Male  s/p syncopal fall with b/l Frontal SDH, SAH along falx with no midline shift.  HCT x2 stable bleed    NEURO:  #acute frontal SDH/SAH    -HCT x3 stable bleed    -NSGY c/s recs: q4 neurochecks ,SBP <150, HOB elevated    -Keppra 500mg q12  #acute pain    -acetaminophen Tablet .. 650 milliGRAM(s) Oral every 6 hours PRN Temp greater or equal to 38C (100.4F), Mild Pain (1 - 3)     RESP:   #hx of COPD (no home o2)    -Albuterol     -maintain saturation >90%    #Activity    - Ambulate with assistance     CARDS:   #Acute syncopal workup pending    -Obtain orthostatic vitals    - Echo findings:   #acute episode of VTach on AICD interrogation    -episodes of SVT/Vtach seen on device terminated by shock x2, device functioning properly  Patient started on Amio as per EP recs    -EP c/s recs: Start Amiodarone, 150mg bolus f/b 1mg/min x 6 hours then 0.5mg/min x 18 hours per EP. When Amio drip completed, start PO 200mg BID     -F/u with Dr. Laguerre in 1month for eval for for outpatient EP study/ablation once patient can tolerate AC (currently only on ASA)  #Hx of MI (2006), CAD, Stents placed in 2006 AICD 2015,     -Metoprolol 50mg XL    -ASA 81mg holding per Neuro sx  #hx of CHF (EF 30-35%)    -Entresto HOLDING secondary to DO as per primary team    -Furosemide 20mg holding in setting of DO  #hx of HTN    -Amlodipine 5mg HOLDING until SBP stable  #hx of HLD    -Atorvastatin 40mg    -Ezetimibe 10mg   #Imaging  - EKG in ED shows sinus tachycardia with occasional Premature ventricular complexes  - TTE Echo with contrast (12/2021):  Severely decreased global left ventricular systolic function    -Repeat ECHO pending  #Labs    -Trop neg x3    -Trend pro-BNP    GI/NUTR:   #Regular diet    -aspiration precautions, HOB 30  #GI Prophylaxis    -not indicated  #Bowel regimen    -Not indicated, senna prn    /RENAL:   #voiding     Labs:          BUN/Cr- 29/1.4  -->,  22/1.3  -->          Electrolytes-Na 140 // K 4.8 // Mg 1.9 //  Phos 2.5 (04-25 @ 05:35)  #DO (baseline Cr 0.9)  -Continue with maintenance fluid  - Trend CK f/u w/ labs due to unknown time down currently <300  #maintain euvolemia           HEME/ONC:   #DVT prophylaxis    -SCDs    -Began Heparin SQ 5,000 U q8   - As per neuro sx hold all AC/AP x2 weeks    Labs: Hb/Hct:  15.2/48.2  -->,  14.3/45.2  -->                      Plts:  128  -->,  122  -->                 PTT/INR:  34.1/1.19  --->     T&S Expires: 4/27    ID:  WBC- 9.70  --->>,  9.95  --->>  Temp trend- 24hrs T(F): 97.6 (04-24 @ 20:00), Max: 97.8 (04-24 @ 08:17)  Current antibiotics- not indicated    ENDO:    -FSG ACHS    -Glucose goal 140-180    -if above 180 start ISS    MSK:     Activity - Out of bed with assistance    LINES/DRAINS:  PIV    ADVANCED DIRECTIVES:  Full Code    HCP/Emergency Contact- Martha (Wife) [phone]    INDICATION FOR SICU: Syncope and collapse, SDH q4 neurochecks    DISPO:   Case discussed with attending Dr. Nobles Assessment/Plan	  73y Male  s/p syncopal fall with b/l Frontal SDH, SAH along falx with no midline shift.  HCT x3 stable bleed    NEURO:  #acute frontal SDH/SAH    -HCT x3 stable bleed    -NSGY c/s recs: q4 neurochecks ,SBP <150, HOB elevated    -Keppra 500mg q12  #acute pain    -acetaminophen Tablet .. 650 milliGRAM(s) Oral every 6 hours PRN Temp greater or equal to 38C (100.4F), Mild Pain (1 - 3)     RESP:   #hx of COPD (no home o2)    -Albuterol     -maintain saturation >90%    #Activity    - Ambulate with assistance     CARDS:   #Acute syncopal workup    -Obtained orthostatic vitals    - Echo findings:   #acute episode of VTach on AICD interrogation    -episodes of SVT/Vtach seen on device terminated by shock x2, device functioning properly  Patient started on Amio as per EP recs    -EP c/s recs: Start Amiodarone, 150mg bolus f/b 1mg/min x 6 hours then 0.5mg/min x 18 hours per EP. When Amio drip completed, start PO 200mg BID     -F/u with Dr. Laguerre in 1month for eval for for outpatient EP study/ablation once patient can tolerate AC (currently only on ASA)  #Hx of MI (2006), CAD, Stents placed in 2006 AICD 2015,     -Metoprolol 50mg XL    -ASA 81mg holding per Neuro sx  #hx of CHF (EF 30-35%)    -Entresto HOLDING secondary to DO as per primary team    -Furosemide 20mg holding in setting of DO  #hx of HTN    -Amlodipine 5mg HOLDING until SBP stable  #hx of HLD    -Atorvastatin 40mg    -Ezetimibe 10mg   #Imaging  - ECHO findings:   - EKG in ED shows sinus tachycardia with occasional Premature ventricular complexes  - TTE Echo with contrast (12/2021):  Severely decreased global left ventricular systolic function  #Labs    -Trop neg x3    -Trending pro-BNP    GI/NUTR:   #Regular diet    -aspiration precautions, HOB 30  #GI Prophylaxis    -not indicated  #Bowel regimen    -Not indicated, senna prn    /RENAL:   #voiding     Labs:          BUN/Cr- 29/1.4  -->,  22/1.3  -->          Electrolytes-Na 140 // K 4.8 // Mg 1.9 //  Phos 2.5 (04-25 @ 05:35)  #DO (baseline Cr 0.9)  -Continue with maintenance fluid  - Trend CK f/u w/ labs due to unknown time down currently <300  #maintain euvolemia           HEME/ONC:   #DVT prophylaxis    -SCDs    -Began Heparin SQ 5,000 U q8   - As per neuro sx hold all AC/AP x2 weeks    Labs: Hb/Hct:  15.2/48.2  -->,  14.3/45.2  -->                      Plts:  128  -->,  122  -->                 PTT/INR:  34.1/1.19  --->     T&S Expires: 4/27    ID:  WBC- 9.70  --->>,  9.95  --->>  Temp trend- 24hrs T(F): 97.6 (04-24 @ 20:00), Max: 97.8 (04-24 @ 08:17)  Current antibiotics- not indicated    ENDO:    -FSG ACHS    -Glucose goal 140-180    -if above 180 start ISS    MSK:     Activity - Out of bed with assistance    LINES/DRAINS:  PIV    ADVANCED DIRECTIVES:  Full Code    HCP/Emergency Contact- Martha (Wife) [phone]    INDICATION FOR SICU: Syncope and collapse, SDH q4 neurochecks    DISPO:   Case discussed with attending Dr. Nobles Assessment/Plan	  73y Male  s/p syncopal fall with b/l Frontal SDH, SAH along falx with no midline shift.  HCT x3 stable bleed    NEURO:  #acute frontal SDH/SAH    -HCT x3 stable bleed    -NSGY c/s recs: q4 neurochecks ,SBP <150, HOB elevated    -Keppra 500mg q12  #acute pain    -acetaminophen Tablet .. 650 milliGRAM(s) Oral every 6 hours PRN Temp greater or equal to 38C (100.4F), Mild Pain (1 - 3)     RESP:   #hx of COPD (no home o2)    -Albuterol     -maintain saturation >90%    #Activity    - Ambulate with assistance     CARDS:   #Acute syncopal workup    -Obtained orthostatic vitals    - Echo findings: Left Ventricle: Endocardial visualization was enhanced with intravenous echo contrast. Global LV systolic function was severely decreased. Left  ventricular ejection fraction, by visual estimation, is 20 to 25%. 2.5 x  1.3 cm filling defect on the contrast enhanced images consistent with LV  apical thrombus.  #acute episode of VTach on AICD interrogation    -episodes of SVT/Vtach seen on device terminated by shock x2, device functioning properly  Patient started on Amio as per EP recs    -EP c/s recs: Start Amiodarone, 150mg bolus f/b 1mg/min x 6 hours then 0.5mg/min x 18 hours per EP. When Amio drip completed, start PO 200mg BID     -F/u with Dr. Laguerre in 1month for eval for for outpatient EP study/ablation once patient can tolerate AC (currently only on ASA)  #Hx of MI (2006), CAD, Stents placed in 2006 AICD 2015,     -Metoprolol 50mg XL    -ASA 81mg holding per Neuro sx  #hx of CHF (EF 30-35%)    -Entresto HOLDING secondary to DO as per primary team    -Furosemide 20mg holding in setting of DO  #hx of HTN    -Amlodipine 5mg HOLDING until SBP stable  #hx of HLD    -Atorvastatin 40mg    -Ezetimibe 10mg   #Imaging  - ECHO findings: EF: 20 to 25%, 2.5 x 1.3 cm filling defect on the contrast enhanced images consistent with LV apical thrombus.  - EKG in ED shows sinus tachycardia with occasional Premature ventricular complexes  - TTE Echo with contrast (12/2021):  Severely decreased global left ventricular systolic function  #Labs    -Trop neg x3    -Trending pro-BNP    GI/NUTR:   #Regular diet, DASH    -aspiration precautions, HOB 30  #GI Prophylaxis    -not indicated  #Bowel regimen    -Not indicated, senna prn    /RENAL:   #voiding     Labs:          BUN/Cr- 29/1.4  -->,  22/1.3  -->          Electrolytes-Na 140 // K 4.8 // Mg 1.9 //  Phos 2.5 (04-25 @ 05:35)  #DO (baseline Cr 0.9)  -Continue with maintenance fluid  - CK downtrending currently 136  #maintain euvolemia           HEME/ONC:   #DVT prophylaxis    -SCDs    - Will f/u with neurosx for recommendations anticoagulation in setting of LV apical thrombus?    -Began Heparin SQ 5,000 U q8    - As per neuro sx hold all AC/AP x2 weeks    Labs: Hb/Hct:  15.2/48.2  -->,  14.3/45.2  -->                      Plts:  128  -->,  122  -->                 PTT/INR:  34.1/1.19  --->     T&S Expires: 4/27    ID:  WBC- 9.70  --->>,  9.95  --->>  Temp trend- 24hrs T(F): 97.6 (04-24 @ 20:00), Max: 97.8 (04-24 @ 08:17)  Current antibiotics- not indicated    ENDO:    -FSG ACHS    -Glucose goal 140-180    -if above 180 start ISS    MSK:     Activity - Out of bed with assistance    LINES/DRAINS:  PIV    ADVANCED DIRECTIVES:  Full Code    HCP/Emergency Contact- Martha (Wife) [phone]    INDICATION FOR SICU: Syncope and collapse, SDH q4 neurochecks, AICD interrogated with Vtach/SVT    DISPO:   Case discussed with attending Dr. Nobles

## 2023-04-25 NOTE — CONSULT NOTE ADULT - ATTENDING COMMENTS
syncope/vt   lv thrombus  sdh/sah  neuro sx input re: a/c timing  mx as above  will follow
Critical Care: [id number]   This patient has a high probability of sudden, clinically significant deterioration, which requires the highest level of physician preparedness to intervene urgently. I managed/supervised life or organ supporting interventions that required frequent physician assessment. I devoted my full attention in the ICU to the direct care of this patient for the period of time indicated below. Time I spent with the family or surrogate(s) is included only if the patient was incapable of providing the necessary information or participating in medical decision making. Time devoted to teaching and to any procedures I billed separately is not included.     ZUNILDA DENNIS 73y Male admitted to [x ] SICU /[ ] SDU with mechanical fall, sustained SDH, upon interrogation of ICD episodes of V.tach detected, borderline hypotension, high risk for hemodynamic instability, airway at risk for obstruction, electrolytes abnormalities   Patient is examined and evaluated at the bedside with SICU team. Treatment plan discussed with SICU team, nurses and primary team.   Chest X-ray and all relevant studies reviewed during rounds.  Will continue hemodynamic monitoring as per protocol in SICU.    Neuro:  GCS [15 ]   [ x]  Neurovascular checks as per SICU protocol, repeat NCHCT, neurosurgery follow up                 [ ] 3% NaCl                     Paralysis [ ] Yes  [x ]  No      Sedated/Pain control with                 [ ] Dilaudid drip, [ ]  Ketamine drip, [ ] Fentanyl drip, [ ] Propofol, [ ] Precedex, [ ] Versed drip, [ ] Ativan drip,                           [ ] OxyContin standing,  [ ] OxyContin PRN, [ ] Dilaudid PRN pushes, [ ] Fentanyl PRN pushes, [ ] PCA,                [x ] Tylenol IV/PO, [x ] Gabapentin, [ ]  Ketorolac, [ ] Tramadol,  [ ] Lidoderm Patch       Other Medications               [ ] Seroquel, [ ] Zyprexa, [ ] Haldol,  [ ] Clonazepam [ ] Xanax, [ ] Versed/Ativan PRN, [ ] Valium [ x] None               [ ] Robaxin   [ ] Baclofen  [ ] Flexeril               [x ] Keppra  [ ] Lamictal  [ ] Depakote  [ ] Dilantin               [ ] CIWA (Ativan/Valium/ Librium)  CV: continue to monitor, cardiology follow up, rate control as needed  On pressors [ ]  Yes  [x ]  No          [ ]  Levophed, [ ] Wes-Synephrine, [ ] Vasopressin, [ ]  Epinephrine          [ ] Dobutamine, [ ] Milrinone, [ ]  Midodrine,  [ ] Others    Other Cardiac Meds          [ ] Amiodarone IV/PO, [ ] Digoxin, [ ] Cardizem drip, [ ] Cleviprex drip, [ ] Esmolol drip, [ ] Cardene drip    Respiratory: Acute respiratory insufficiency -> continue monitoring                        None Invasive Support  [x ] Incentive Spirometer                                  [ ] BiPAP   [ ] CPAP/NIV   [ ] HFNC   [ ] NR Face Mask   [x ] NC  [ ] Trach Caller                        Ventilatory support  [ ] Yes [x ] No                            [ ] SBT                                  [ ] PC    [ ] VC   [ ] AC/PRVC   [ ] BiVent/APRV   [ ]CPAP   GI  [x ]  bowel regiment  [x ] BM none [ x] Flatus +             [ ] Ostomy   [ ] NG tube        Prophylaxis [x ] PPI  [ ] H2 Blockers  [ ] Others  Nutrition: continue   [x ] Diet NPO  [ ] TPN/PPN   [ ] calories count   [ ]  Tube Feeds     Renal: Continue I&Os monitoring, Oshea catheter  [ ] Yes,  [ ]   No ,  [ ] Consideration for discontinuation [ ] Taxes cath/PrimaFit  [ ] TOV  [ ] HD/CVVH       Lytes/Acid-base: replete hypokalemia, hypomagnesemia, hypocalcemia, hypophosphatemia        IV Fluids   [x ] LR@100ml/hr,  [ ] NS  [ ] D5W1/2NS [ ] Bicarbonate Drip  [ ] Albumin [ ] Lasix  ID: leukocytosis -> continue to monitor:         IV Abx [ ] Yes, [ x] No;  ID consulted [ ] Yes, [ x] No        Cultures send  [ ] Respiratory   [ ] Blood   [ ] Urine  [ ] Fluids  [ ] Tissue  [x ]  None  Lines:   [ ] Right   [ ] Left  [ ] Bilateral                     [ ] Subclavian TLC        [ ]  Internal Jugular TLC     [ ]  Femoral TLC                     [ ] Subclavian Cordis    [ ] Internal Jugular Cordis   [ ] Femoral Cordis                     [ ] HD catheter    [ ] PICC     [ ]  Midline   [x ] Peripheral IVs                                                 [ ] Right   [ ] Left   [x ] None                     [ ] Radial A-Line    [ ] Femoral A-Line   [ ] Axillary A-Line               Heme: continue to evaluate for acute blood loss anemia- trend Hg/Hct                     AC Reversal Indicated [ ] Yes  [x ] No                                      [ ] Kcentra,  [ ] 3Factors concentrate, [ ] Adnexxa                     Transfused  indicated  [ ] Yes, [x ] No    [ ] PRBCs   [ ] Platelets   [ ] FFPs   [ ] Cryoprecipitate                    Should be started on or continued with  following  [ ] Yes,  [x ] No                               [ ] Lovenox  [ ] Coumadin  [ ] Heparin drip  [ ] NOVACs  [ ] ASA  [ ] Antiplatelets   Endocrine: Prevent and treat hyperglycemia with insulin as needed,                                 Insuline drip [ ] Yes, [x ] No   PV: follow pulse exam  Skin: decub precautions  DVT Prophylaxis:  [ x] SCDs  [ ] Heparin SQ  [ ] Lovenox  SQ  Stress Gastritis Prophylaxis: PPI/H2 Blockers if indicated  Mobility: patient is evaluated at the bedside with mobility team and the goals for today are discussed with PT [x ] bed rest    PATIENT/FAMILY/SURROGATE CONFERENCE:  [x ] Yes with patient at the bedside. [ ] No  PURPOSE: To obtain necessary information, To discuss treatment options under consideration today.    I saw and evaluated the patient personally. I have reviewed and agree with note above. Treatment plan discussed with SICU team, nurses and primary team at the time of the multidisciplinary rounds. The above note is NOT written at the time of rounds and will reflect all changes throughout management of the patient for the day note is written for.    Clary Nobles MD, FACS  Trauma/ACS/SCC Attending

## 2023-04-25 NOTE — PHYSICAL THERAPY INITIAL EVALUATION ADULT - NSPTDISCHREC_GEN_A_CORE
Patient requires assistance with functional mobility. Based on today's evaluation,  PT recommends D/C to home with assistance when medically appropriate.

## 2023-04-25 NOTE — CONSULT NOTE ADULT - ASSESSMENT
73M PMH of CHF (EF 30-35% in 12/2022) with AICD ([facility] MRI XT ), CAD s/p PCI (x5 2006) s/p CABG 12/2022, HTN, COPD (no home O2) presenting s/p syncopal fall +HT, +LOC, -AC (on ASA 81mg).     Syncope likely VT s/p AICD shock  New LV thrombus  SAH/SDH  CAD sp CABG  ICM EF 30-35%  HTN  HLD  COPD    - Trop neg x 3. EKG shows no sign of acute ischemia  - AICD shows new episode VT s/p shock  - Echo 04/25 shows EF 25% with new LV thrombus  - Pt has acute SDH/SAH  - Pt will need to started on anticoagulation once cleared by neurosurgery  - Restart aspirin 81mg once cleared by neurosurgery  - Metoprolol and Amiodarone per EP  - Restart Entresto once DO resolves  - Pt will also likely need ischemic workup. Will likely hold inpatient ischemic workup given acute SDH/SAH. Cont to monitor pt on telemetry. If pt has more episodes of VT then will reconsider ischemic workup   73M PMH of CHF (EF 30-35% in 12/2022) with AICD ([facility] MRI XT ), CAD s/p PCI (x5 2006) s/p CABG 12/2022, HTN, COPD (no home O2) presenting s/p syncopal fall +HT, +LOC, -AC (on ASA 81mg).     Syncope likely VT s/p AICD shock  New LV thrombus  SAH/SDH  CAD sp CABG  ICM EF 30-35%  HTN  HLD  COPD    - Trop neg x 3. EKG shows no sign of acute ischemia  - AICD shows new episode VT s/p shock  - Echo 04/25 shows EF 25% with new LV thrombus  - Pt has acute SDH/SAH  - Pt will need to started on anticoagulation once cleared by neurosurgery  - Restart aspirin 81mg once cleared by neurosurgery  - Metoprolol and Amiodarone per EP  - Restart Entresto once DO resolves  - Pt will also likely need ischemic workup. Will likely hold inpatient ischemic workup given acute SDH/SAH. Cont to monitor pt on telemetry.   73M PMH of CHF (EF 30-35% in 12/2022) with AICD ([facility] MRI XT ), CAD s/p PCI (x5 2006) s/p CABG 12/2022, HTN, COPD (no home O2) presenting s/p syncopal fall +HT, +LOC, -AC (on ASA 81mg).     Syncope, VT s/p AICD shock   LV thrombus  SAH/SDH  CAD sp CABG  ICM EF 30-35%  HTN  HLD  COPD    - Trop neg x 3. EKG shows no sign of acute ischemia  - AICD shows new episode VT s/p shock  - Echo 04/25 shows EF 25% with new LV thrombus  - Pt has acute SDH/SAH  - Pt will need to started on anticoagulation once cleared by neurosurgery  - Restart aspirin 81mg once cleared by neurosurgery  - Metoprolol and Amiodarone per EP  - Restart Entresto once DO resolves  - Pt will also likely need ischemic workup. Will likely hold inpatient ischemic workup given acute SDH/SAH. Cont to monitor pt on telemetry.

## 2023-04-25 NOTE — CONSULT NOTE ADULT - REASON FOR ADMISSION
s/p syncopal fall, +HT > SDH/SAH

## 2023-04-25 NOTE — PHYSICAL THERAPY INITIAL EVALUATION ADULT - ADDITIONAL COMMENTS
pt lives with his wife in a house with 4 steps to enter and one flight to bedrooms. pt was fully independent prior to admission.

## 2023-04-25 NOTE — CONSULT NOTE ADULT - CONSULT REASON
Syncope
LV thrombus
SDH / SAH
SDH/SAH
s/p syncopal fall with b/l Frontal SDH, SAH along falx with no midline shift.

## 2023-04-25 NOTE — PROGRESS NOTE ADULT - SUBJECTIVE AND OBJECTIVE BOX
GENERAL SURGERY PROGRESS NOTE    Patient: ZUNILDA DENNIS , 73y ([birth year])Male   MRN: [id number]  Location: [place] [address] A  Visit: 04-24-23 Inpatient  Date: 04-25-23 @ 00:48    Events of past 24 hours:  NAEON  Repeat CTH stable.  Trops neg x2.  Patient seen sleeping comfortably.    PAST MEDICAL & SURGICAL HISTORY:  COPD (chronic obstructive pulmonary disease)    CAD (coronary artery disease)    Myocardial infarction  2006    CAD (coronary artery disease)  HAD CARDIAC CATH WITH STENTS 2006    Hypercholesteremia    HTN (hypertension)    CAD (coronary artery disease)  CARDIAC CATH 2006  AICD 2015    History of coronary artery stent placement      Vitals:   T(F): 97.6 (04-24-23 @ 20:00), Max: 97.8 (04-24-23 @ 08:17)  HR: 75 (04-24-23 @ 23:00)  BP: 118/76 (04-24-23 @ 23:00)  RR: 19 (04-24-23 @ 23:00)  SpO2: 96% (04-24-23 @ 23:00)      Diet, Regular      Fluids: sodium chloride 0.9%.: Solution, 1000 milliLiter(s) infuse at 70 mL/Hr    PHYSICAL EXAM:  General: NAD, calm and cooperative.  Cardiac: RRR.  Respiratory: On RA. Speaks in complete sentences. No accessory muscles of respiration in use. Bilateral chest rise.  Abdomen: Soft, non-distended, non-tender, no rebound, no guarding.   Neuro: Moves all extremities. No focal deficits.  Vascular: Pulses 2+ throughout, extremities well perfused.  Skin: Warm/dry, normal color, no jaundice.      MEDICATIONS  (STANDING):  aMIOdarone Infusion 0.5 mG/Min (16.7 mL/Hr) IV Continuous <Continuous>  aMIOdarone Infusion 1 mG/Min (33.3 mL/Hr) IV Continuous <Continuous>  aMIOdarone IVPB 150 milliGRAM(s) IV Intermittent once  atorvastatin 40 milliGRAM(s) Oral at bedtime  levETIRAcetam 500 milliGRAM(s) Oral two times a day  metoprolol succinate ER 50 milliGRAM(s) Oral daily  senna 2 Tablet(s) Oral at bedtime  sodium chloride 0.9%. 1000 milliLiter(s) (70 mL/Hr) IV Continuous <Continuous>    MEDICATIONS  (PRN):  acetaminophen     Tablet .. 650 milliGRAM(s) Oral every 6 hours PRN Temp greater or equal to 38C (100.4F), Mild Pain (1 - 3)  albuterol    90 MICROgram(s) HFA Inhaler 2 Puff(s) Inhalation every 6 hours PRN Shortness of Breath and/or Wheezing      DVT PROPHYLAXIS:   GI PROPHYLAXIS:   ANTICOAGULATION:   ANTIBIOTICS:        LAB/STUDIES:  Labs:  CAPILLARY BLOOD GLUCOSE                          14.3   9.95  )-----------( 122      ( 24 Apr 2023 17:18 )             45.2       Auto Neutrophil %: 79.9 % (04-24-23 @ 17:18)  Auto Immature Granulocyte %: 0.4 % (04-24-23 @ 17:18)  Auto Neutrophil %: 81.9 % (04-24-23 @ 09:30)  Auto Immature Granulocyte %: 0.5 % (04-24-23 @ 09:30)    04-24    138  |  108  |  29<H>  ----------------------------<  89  4.9   |  18  |  1.4      Calcium, Total Serum: 9.5 mg/dL (04-24-23 @ 17:18)      LFTs:             7.1  | 0.3  | 23       ------------------[63      ( 24 Apr 2023 09:30 )  4.2  | x    | 15          Lipase:x      Amylase:x          Coags:     13.60  ----< 1.19    ( 24 Apr 2023 10:50 )     34.1        CARDIAC MARKERS ( 24 Apr 2023 17:18 )  x     / <0.01 ng/mL / 220 U/L / x     / 8.7 ng/mL  CARDIAC MARKERS ( 24 Apr 2023 10:50 )  x     / x     / 227 U/L / x     / 8.7 ng/mL  CARDIAC MARKERS ( 24 Apr 2023 09:30 )  x     / <0.01 ng/mL / x     / x     / x          IMAGING:    < from: CT Head No Cont (04.24.23 @ 13:21) >  IMPRESSION:    In comparison with the prior CT scan of the brain dated April 24, 2023   time 9:40 AM:    Again demonstrated are acute subdural hemorrhages, acute subarachnoid   hemorrhages, and subarachnoid versusparenchymal hemorrhagic contusions,   unchanged.    No new areas of acute intracranial hemorrhage.    < end of copied text >      < from: CT Abdomen and Pelvis No Cont (04.24.23 @ 13:20) >  IMPRESSION:    No CT evidence of acute intrathoracic or intra-abdominal pathology.    10 mm right lower lobe solid nodule seen on series 4 image 88. Malignancy   is not excluded. Recommend follow-up PET/CT.    Right basilar ill-defined groundglass density versus reticulation with   some focal bronchiolectasis. This could represent an early interstitial   lung abnormality versus postinfectious/inflammatory change.    Stable 2.0 cm right interpolar renal lesion measuring higher than simple   fluid attenuation. Differential includes a hemorrhagic cyst or solid   mass. Recommend outpatient ultrasound when possible.    Mildly enlarged prostate gland.    < end of copied text >      < from: CT Head No Cont (04.24.23 @ 09:48) >  IMPRESSION:    CT HEAD:  Acute subdural hemorrhages along the cerebral falx and along the   bifrontal convexities (right larger than left). No midline shift.    Scattered subarachnoid hemorrhages in the interhemispheric fissure and   bilateral cerebral sulci as above.    Redemonstrated 2 cm planum sphenoidale meningioma with slightly increased   peritumoral edema.    CT CERVICAL SPINE:  No acute cervical fracture or facet subluxation.    < end of copied text >

## 2023-04-25 NOTE — PROGRESS NOTE ADULT - SUBJECTIVE AND OBJECTIVE BOX
ZUNILDA DENNIS   [id number]   [birth year]  73yM    Admit Date: 04-24-23  Indication for SDU: s/p syncopal fall with b/l Frontal SDH, SAH along falx with no midline shift.        ============================  HPI:  73M PMH of CHF (EF 30-35% in 12/2022) with AICD ([facility] [facility] XT DR), CAD s/p PCI (x5 2006) s/p CABG 12/2022, HTN, COPD (no home O2) presenting s/p syncopal fall +HT, +LOC, -AC (on ASA 81mg). Patient states that he was running late to play golf today and was running with his clubs when he syncopized and fell backward. The fall was witnessed by bystanders and states that he lost consciousness for a few minutes. No loss of fecal or urinary continence. No confusion after episode. Patient only complaining of headache and cervical spinal tenderness. Patient has voided since in ED. Patient had no chest pain, dyspnea, +dizziness (felt the room spinning). SICU consult, seen and examined. Patient denies beginning new medications, and states he ate breakfast this AM. Patient states his cardiologist started him on an "anti-dizziness medication" but cannot remember the name and was unable to locate in home medication list. Patient has discontinued DAPT per cardiologist as stents were placed in 2001.         24 Hour Events  4/24  NIGHT  - [ ] AM trop ckmb  - [ ] AM CTH  - a/ox3, motor sensory function intact in upper and lower bilateral extremities, PERRLA bilaterally    DAY  - Admit to SICU   NSGY: SBP <150, HOB elevation, Keppra BID   -Holding home amlodipine until stable SBP  -Holding home entresto, lasix 2/2 to DO (b/l Cr 0.9)  -EP: Amio 150mg bolus --> Amio gtt 1mg/min x6hr then 0.5mg x18hr. Once finished, will start PO 200mg BID x 2wks f/b 200mg qd. plan FOR out-patient EP study / ablation once can tolerate therapeutic AC if needed for the procedure   -Daily EKG      -ED ekg: sinus tachycardia w/ occasional PVC; LT anterior fasicular block ()  -Trop neg x1 --> f/u 2 more sets   -f/u Syncopy w/u --> echo   -Physiatry: possible 4A candidate (if unable to ambulate)   -f/u BNP  - --> 220      [X] A ten-point review of systems was otherwise negative except as noted above.  [  ] Due to altered mental status/intubation, subjective information was not attained from the patient. History was obtained, to the extent possible, from review of the chart and collateral sources of information.   ZUNILDA DENNIS   [id number]   [birth year]  73yM    Admit Date: 23  Indication for SDU: s/p syncopal fall with b/l Frontal SDH, SAH along falx with no midline shift.        ============================  HPI:  73M PMH of CHF (EF 30-35% in 2022) with AICD ([employer] XT DR), CAD s/p PCI (x5 ) s/p CABG 2022, HTN, COPD (no home O2) presenting s/p syncopal fall +HT, +LOC, -AC (on ASA 81mg). Patient states that he was running late to play golf today and was running with his clubs when he syncopized and fell backward. The fall was witnessed by bystanders and states that he lost consciousness for a few minutes. No loss of fecal or urinary continence. No confusion after episode. Patient only complaining of headache and cervical spinal tenderness. Patient has voided since in ED. Patient had no chest pain, dyspnea, +dizziness (felt the room spinning). SICU consult, seen and examined. Patient denies beginning new medications, and states he ate breakfast this AM. Patient states his cardiologist started him on an "anti-dizziness medication" but cannot remember the name and was unable to locate in home medication list. Patient has discontinued DAPT per cardiologist as stents were placed in .         24 Hour Events    NIGHT  - [ ] AM trop ckmb  - [ ] AM CTH  - a/ox3, motor sensory function intact in upper and lower bilateral extremities, PERRLA bilaterally    DAY  - Admit to SICU   NSGY: SBP <150, HOB elevation, Keppra BID   -Holding home amlodipine until stable SBP  -Holding home entresto, lasix 2/2 to DO (b/l Cr 0.9)  -EP: Amio 150mg bolus --> Amio gtt 1mg/min x6hr then 0.5mg x18hr. Once finished, will start PO 200mg BID x 2wks f/b 200mg qd. plan FOR out-patient EP study / ablation once can tolerate therapeutic AC if needed for the procedure   -Daily EKG      -ED ekg: sinus tachycardia w/ occasional PVC; LT anterior fasicular block ()  -Trop neg x1 --> f/u 2 more sets   -f/u Syncopy w/u --> echo   -Physiatry: possible 4A candidate (if unable to ambulate)   -f/u BNP  - --> 220      [X] A ten-point review of systems was otherwise negative except as noted above.  [  ] Due to altered mental status/intubation, subjective information was not attained from the patient. History was obtained, to the extent possible, from review of the chart and collateral sources of information.    Daily     Daily     Diet, Regular:   DASH/TLC Sodium & Cholesterol Restricted (23 @ 08:51)      CURRENT MEDS:  Neurologic Medications  acetaminophen     Tablet .. 650 milliGRAM(s) Oral every 6 hours PRN Temp greater or equal to 38C (100.4F), Mild Pain (1 - 3)  levETIRAcetam 500 milliGRAM(s) Oral two times a day    Respiratory Medications  albuterol    90 MICROgram(s) HFA Inhaler 2 Puff(s) Inhalation every 6 hours PRN Shortness of Breath and/or Wheezing    Cardiovascular Medications  aMIOdarone    Tablet 200 milliGRAM(s) Oral every 12 hours  aMIOdarone Infusion 0.5 mG/Min IV Continuous <Continuous>  aMIOdarone IVPB 150 milliGRAM(s) IV Intermittent once  metoprolol succinate ER 50 milliGRAM(s) Oral daily    Gastrointestinal Medications  senna 2 Tablet(s) Oral at bedtime  sodium chloride 0.9%. 1000 milliLiter(s) IV Continuous <Continuous>    Genitourinary Medications    Hematologic/Oncologic Medications  heparin   Injectable 5000 Unit(s) SubCutaneous every 8 hours    Antimicrobial/Immunologic Medications    Endocrine/Metabolic Medications  atorvastatin 40 milliGRAM(s) Oral at bedtime    Topical/Other Medications  chlorhexidine 2% Cloths 1 Application(s) Topical <User Schedule>      ICU Vital Signs Last 24 Hrs  T(C): 36.5 (2023 07:58), Max: 36.5 (2023 07:58)  T(F): 97.7 (2023 07:58), Max: 97.7 (2023 07:58)  HR: 69 (2023 10:00) (69 - 103)  BP: 129/77 (2023 10:00) (84/50 - 145/73)  BP(mean): 99 (2023 10:00) (86 - 101)  ABP: --  ABP(mean): --  RR: 21 (2023 10:00) (16 - 31)  SpO2: 96% (2023 10:00) (91% - 99%)    O2 Parameters below as of 2023 07:  Patient On (Oxygen Delivery Method): room air            Adult Advanced Hemodynamics Last 24 Hrs  CVP(mm Hg): --  CVP(cm H2O): --  CO: --  CI: --  PA: --  PA(mean): --  PCWP: --  SVR: --  SVRI: --  PVR: --  PVRI: --          I&O's Summary    :  -  2023 07:00  --------------------------------------------------------  IN: 1210 mL / OUT: 570 mL / NET: 640 mL    : 11:09  --------------------------------------------------------  IN: 260.1 mL / OUT: 100 mL / NET: 160.1 mL      I&O's Detail    :  -  2023 07:00  --------------------------------------------------------  IN:    Amiodarone: 199.8 mL    Amiodarone: 100.2 mL    sodium chloride 0.9%: 910 mL  Total IN: 1210 mL    OUT:    Voided (mL): 570 mL  Total OUT: 570 mL    Total NET: 640 mL      :  -  2023 11:09  --------------------------------------------------------  IN:    Amiodarone: 50.1 mL    sodium chloride 0.9%: 210 mL  Total IN: 260.1 mL    OUT:    Voided (mL): 100 mL  Total OUT: 100 mL    Total NET: 160.1 mL          PHYSICAL EXAM:    General/Neuro  RASS:             GCS:     = E   / V   / M      Deficits:                             alert & oriented x 3, no focal deficits  Pupils:    Lungs:      clear to auscultation, Normal expansion/effort.     Cardiovascular : S1, S2.  Regular rate and rhythm.  Peripheral edema   Cardiac Rhythm: Normal Sinus Rhythm    GI: Abdomen soft, Non-tender, Non-distended.    Gastrostomy / Jejunostomy tube in place.  Nasogastric tube in place.  Colostomy / Ileostomy.    Wound:    Extremities: Extremities warm, pink, well-perfused. Pulses:Rt     Lt    Derm: Good skin turgor, no skin breakdown.    : Voiding well. Last      LABS:  CAPILLARY BLOOD GLUCOSE                        14.8   9.39  )-----------( 127      ( 2023 05:35 )             46.5       04-25    140  |  109  |  22<H>  ----------------------------<  99  4.8   |  20  |  1.3    Ca    9.2      2023 05:35  Phos  2.5     04-25  Mg     1.9     04-25    TPro  6.5  /  Alb  3.9  /  TBili  0.8  /  DBili  0.2  /  AST  17  /  ALT  10  /  AlkPhos  56  04-25      PT/INR - ( 2023 10:50 )   PT: 13.60 sec;   INR: 1.19 ratio         PTT - ( 2023 10:50 )  PTT:34.1 sec  CARDIAC MARKERS ( 2023 05:35 )  x     / x     / 136 U/L / x     / x      CARDIAC MARKERS ( 2023 02:55 )  x     / <0.01 ng/mL / 144 U/L / x     / 6.1 ng/mL  CARDIAC MARKERS ( 2023 17:18 )  x     / <0.01 ng/mL / 220 U/L / x     / 8.7 ng/mL  CARDIAC MARKERS ( 2023 10:50 )  x     / x     / 227 U/L / x     / 8.7 ng/mL  CARDIAC MARKERS ( 2023 09:30 )  x     / <0.01 ng/mL / x     / x     / x          Urinalysis Basic - ( 2023 03:04 )    Color: Yellow / Appearance: Clear / S.025 / pH: x  Gluc: x / Ketone: Trace  / Bili: Negative / Urobili: <2 mg/dL   Blood: x / Protein: Trace / Nitrite: Negative   Leuk Esterase: Negative / RBC: x / WBC x   Sq Epi: x / Non Sq Epi: x / Bacteria: x   ZUNILDA DENNIS   [id number]   [birth year]  73yM    Admit Date: 23  Indication for SDU: s/p syncopal fall with b/l Frontal SDH, SAH along falx with no midline shift.        ============================  HPI:  73M PMH of CHF (EF 30-35% in 2022) with AICD ([employer] MRI XT DR), CAD s/p PCI (x5 ) s/p CABG 2022, HTN, COPD (no home O2) presenting s/p syncopal fall +HT, +LOC, -AC (on ASA 81mg). Patient states that he was running late to play golf today and was running with his clubs when he syncopized and fell backward. The fall was witnessed by bystanders and states that he lost consciousness for a few minutes. No loss of fecal or urinary continence. No confusion after episode. Patient only complaining of headache and cervical spinal tenderness. Patient has voided since in ED. Patient had no chest pain, dyspnea, +dizziness (felt the room spinning). SICU consult, seen and examined. Patient denies beginning new medications, and states he ate breakfast this AM. Patient states his cardiologist started him on an "anti-dizziness medication" but cannot remember the name and was unable to locate in home medication list. Patient has discontinued DAPT per cardiologist as stents were placed in .         24 Hour Events    NIGHT  - [ ] AM trop ckmb  - [ ] AM CTH  - a/ox3, motor sensory function intact in upper and lower bilateral extremities, PERRLA bilaterally    DAY  - Admit to SICU   NSGY: SBP <150, HOB elevation, Keppra BID   -Holding home amlodipine until stable SBP  -Holding home entresto, lasix 2/2 to DO (b/l Cr 0.9)  -EP: Amio 150mg bolus --> Amio gtt 1mg/min x6hr then 0.5mg x18hr. Once finished, will start PO 200mg BID x 2wks f/b 200mg qd. plan FOR out-patient EP study / ablation once can tolerate therapeutic AC if needed for the procedure   -Daily EKG      -ED ekg: sinus tachycardia w/ occasional PVC; LT anterior fasicular block ()  -Trop neg x1 --> f/u 2 more sets   -f/u Syncopy w/u --> echo   -Physiatry: possible 4A candidate (if unable to ambulate)   -f/u BNP  - --> 220      [X] A ten-point review of systems was otherwise negative except as noted above.  [  ] Due to altered mental status/intubation, subjective information was not attained from the patient. History was obtained, to the extent possible, from review of the chart and collateral sources of information.    Daily     Daily     Diet, Regular:   DASH/TLC Sodium & Cholesterol Restricted (23 @ 08:51)      CURRENT MEDS:  Neurologic Medications  acetaminophen     Tablet .. 650 milliGRAM(s) Oral every 6 hours PRN Temp greater or equal to 38C (100.4F), Mild Pain (1 - 3)  levETIRAcetam 500 milliGRAM(s) Oral two times a day    Respiratory Medications  albuterol    90 MICROgram(s) HFA Inhaler 2 Puff(s) Inhalation every 6 hours PRN Shortness of Breath and/or Wheezing    Cardiovascular Medications  aMIOdarone    Tablet 200 milliGRAM(s) Oral every 12 hours  aMIOdarone Infusion 0.5 mG/Min IV Continuous <Continuous>  aMIOdarone IVPB 150 milliGRAM(s) IV Intermittent once  metoprolol succinate ER 50 milliGRAM(s) Oral daily    Gastrointestinal Medications  senna 2 Tablet(s) Oral at bedtime  sodium chloride 0.9%. 1000 milliLiter(s) IV Continuous <Continuous>    Genitourinary Medications    Hematologic/Oncologic Medications  heparin   Injectable 5000 Unit(s) SubCutaneous every 8 hours    Antimicrobial/Immunologic Medications    Endocrine/Metabolic Medications  atorvastatin 40 milliGRAM(s) Oral at bedtime    Topical/Other Medications  chlorhexidine 2% Cloths 1 Application(s) Topical <User Schedule>      ICU Vital Signs Last 24 Hrs  T(C): 36.5 (2023 07:58), Max: 36.5 (2023 07:58)  T(F): 97.7 (2023 07:58), Max: 97.7 (2023 07:58)  HR: 69 (2023 10:00) (69 - 103)  BP: 129/77 (2023 10:00) (84/50 - 145/73)  BP(mean): 99 (2023 10:00) (86 - 101)  ABP: --  ABP(mean): --  RR: 21 (2023 10:00) (16 - 31)  SpO2: 96% (2023 10:00) (91% - 99%)    O2 Parameters below as of 2023 07:  Patient On (Oxygen Delivery Method): room air            Adult Advanced Hemodynamics Last 24 Hrs  CVP(mm Hg): --  CVP(cm H2O): --  CO: --  CI: --  PA: --  PA(mean): --  PCWP: --  SVR: --  SVRI: --  PVR: --  PVRI: --          I&O's Summary    :  -  2023 07:00  --------------------------------------------------------  IN: 1210 mL / OUT: 570 mL / NET: 640 mL    : 11:09  --------------------------------------------------------  IN: 260.1 mL / OUT: 100 mL / NET: 160.1 mL      I&O's Detail    :  -  2023 07:00  --------------------------------------------------------  IN:    Amiodarone: 199.8 mL    Amiodarone: 100.2 mL    sodium chloride 0.9%: 910 mL  Total IN: 1210 mL    OUT:    Voided (mL): 570 mL  Total OUT: 570 mL    Total NET: 640 mL      :  -  2023 11:09  --------------------------------------------------------  IN:    Amiodarone: 50.1 mL    sodium chloride 0.9%: 210 mL  Total IN: 260.1 mL    OUT:    Voided (mL): 100 mL  Total OUT: 100 mL    Total NET: 160.1 mL          PHYSICAL EXAM:    General/Neuro: Alert & oriented x 3, no focal deficits. Equal sensation and strength b/l.  Pupils: Equal reactive to light  Lungs: Clear to auscultation, Normal expansion/effort.   Cardiovascular: Non-tachycardiac, no peripheral edema  GI: Abdomen soft, Non-tender, Non-distended.      Extremities: Extremities warm, pink, well-perfused.   Derm: Good skin turgor, no skin breakdown. No bruising on extremities.   : Voiding well. Last BM     LABS:  CAPILLARY BLOOD GLUCOSE                        14.8   9.39  )-----------( 127      ( 2023 05:35 )             46.5       04-25    140  |  109  |  22<H>  ----------------------------<  99  4.8   |  20  |  1.3    Ca    9.2      2023 05:35  Phos  2.5     04-25  Mg     1.9     04-25    TPro  6.5  /  Alb  3.9  /  TBili  0.8  /  DBili  0.2  /  AST  17  /  ALT  10  /  AlkPhos  56  04-25      PT/INR - ( 2023 10:50 )   PT: 13.60 sec;   INR: 1.19 ratio         PTT - ( 2023 10:50 )  PTT:34.1 sec  CARDIAC MARKERS ( 2023 05:35 )  x     / x     / 136 U/L / x     / x      CARDIAC MARKERS ( 2023 02:55 )  x     / <0.01 ng/mL / 144 U/L / x     / 6.1 ng/mL  CARDIAC MARKERS ( 2023 17:18 )  x     / <0.01 ng/mL / 220 U/L / x     / 8.7 ng/mL  CARDIAC MARKERS ( 2023 10:50 )  x     / x     / 227 U/L / x     / 8.7 ng/mL  CARDIAC MARKERS ( 2023 09:30 )  x     / <0.01 ng/mL / x     / x     / x          Urinalysis Basic - ( 2023 03:04 )    Color: Yellow / Appearance: Clear / S.025 / pH: x  Gluc: x / Ketone: Trace  / Bili: Negative / Urobili: <2 mg/dL   Blood: x / Protein: Trace / Nitrite: Negative   Leuk Esterase: Negative / RBC: x / WBC x   Sq Epi: x / Non Sq Epi: x / Bacteria: x   ZUNILDA DENNIS   [id number]   [birth year]  73yM    Admit Date: 23  Indication for SDU: s/p syncopal fall with b/l Frontal SDH, SAH along falx with no midline shift.        ============================  HPI:  73M PMH of CHF (EF 30-35% in 2022) with AICD ([employer] XT ), CAD s/p PCI (x5 ) s/p CABG 2022, HTN, COPD (no home O2) presenting s/p syncopal fall +HT, +LOC, -AC (on ASA 81mg). Patient states that he was running late to play golf today and was running with his clubs when he syncopized and fell backward. The fall was witnessed by bystanders and states that he lost consciousness for a few minutes. No loss of fecal or urinary continence. No confusion after episode. Patient only complaining of headache and cervical spinal tenderness. Patient has voided since in ED. Patient had no chest pain, dyspnea, +dizziness (felt the room spinning). SICU consult, seen and examined. Patient denies beginning new medications, and states he ate breakfast this AM. Patient states his cardiologist started him on an "anti-dizziness medication" but cannot remember the name and was unable to locate in home medication list. Patient has discontinued DAPT per cardiologist as stents were placed in .         24 Hour Events    Day  -f/u repeat CTH ---> stable   -PO amio 200 BID to start 8pm  -f/u neurosurg starting DVT ppx --> ok for hep subq, hold ASA and plavix for 2 weeks   -cardiology consult for CHF  -LV thrombus on echo---> f/u with Dr. Dahl regarding full AC  -ECHO: 2.5 x 1.3 cm filling defect on the contrast enhanced images   consistent with LV apical thrombus       NIGHT  - [ ] AM trop ckmb  - [ ] AM CTH  - a/ox3, motor sensory function intact in upper and lower bilateral extremities, PERRLA bilaterally    DAY  - Admit to SICU   NSGY: SBP <150, HOB elevation, Keppra BID   -Holding home amlodipine until stable SBP  -Holding home entresto, lasix 2/2 to DO (b/l Cr 0.9)  -EP: Amio 150mg bolus --> Amio gtt 1mg/min x6hr then 0.5mg x18hr. Once finished, will start PO 200mg BID x 2wks f/b 200mg qd. plan FOR out-patient EP study / ablation once can tolerate therapeutic AC if needed for the procedure   -Daily EKG      -ED ekg: sinus tachycardia w/ occasional PVC; LT anterior fasicular block ()  -Trop neg x1 --> f/u 2 more sets   -f/u Syncopy w/u --> echo   -Physiatry: possible 4A candidate (if unable to ambulate)   -f/u BNP  - --> 220      [X] A ten-point review of systems was otherwise negative except as noted above.  [  ] Due to altered mental status/intubation, subjective information was not attained from the patient. History was obtained, to the extent possible, from review of the chart and collateral sources of information.    Daily    Diet, Regular:   DASH/TLC Sodium & Cholesterol Restricted (23 @ 08:51)      CURRENT MEDS:  Neurologic Medications  acetaminophen     Tablet .. 650 milliGRAM(s) Oral every 6 hours PRN Temp greater or equal to 38C (100.4F), Mild Pain (1 - 3)  levETIRAcetam 500 milliGRAM(s) Oral two times a day    Respiratory Medications  albuterol    90 MICROgram(s) HFA Inhaler 2 Puff(s) Inhalation every 6 hours PRN Shortness of Breath and/or Wheezing    Cardiovascular Medications  aMIOdarone    Tablet 200 milliGRAM(s) Oral every 12 hours  aMIOdarone Infusion 0.5 mG/Min IV Continuous <Continuous>  aMIOdarone IVPB 150 milliGRAM(s) IV Intermittent once  metoprolol succinate ER 50 milliGRAM(s) Oral daily    Gastrointestinal Medications  senna 2 Tablet(s) Oral at bedtime  sodium chloride 0.9%. 1000 milliLiter(s) IV Continuous <Continuous>    Hematologic/Oncologic Medications  heparin   Injectable 5000 Unit(s) SubCutaneous every 8 hours    Endocrine/Metabolic Medications  atorvastatin 40 milliGRAM(s) Oral at bedtime    Topical/Other Medications  chlorhexidine 2% Cloths 1 Application(s) Topical <User Schedule>      ICU Vital Signs Last 24 Hrs  T(C): 36.5 (2023 07:58), Max: 36.5 (2023 07:58)  T(F): 97.7 (2023 07:58), Max: 97.7 (2023 07:58)  HR: 69 (2023 10:00) (69 - 103)  BP: 129/77 (2023 10:00) (84/50 - 145/73)  BP(mean): 99 (2023 10:00) (86 - 101)  ABP: --  ABP(mean): --  RR: 21 (2023 10:00) (16 - 31)  SpO2: 96% (2023 10:00) (91% - 99%)    O2 Parameters below as of :00  Patient On (Oxygen Delivery Method): room air        I&O's Summary    :  -  2023 07:00  --------------------------------------------------------  IN: 1210 mL / OUT: 570 mL / NET: 640 mL    :  -  2023 11:09  --------------------------------------------------------  IN: 260.1 mL / OUT: 100 mL / NET: 160.1 mL      I&O's Detail    :  -  2023 07:00  --------------------------------------------------------  IN:    Amiodarone: 199.8 mL    Amiodarone: 100.2 mL    sodium chloride 0.9%: 910 mL  Total IN: 1210 mL    OUT:    Voided (mL): 570 mL  Total OUT: 570 mL    Total NET: 640 mL      2023 07:01  -  2023 11:09  --------------------------------------------------------  IN:    Amiodarone: 50.1 mL    sodium chloride 0.9%: 210 mL  Total IN: 260.1 mL    OUT:    Voided (mL): 100 mL  Total OUT: 100 mL    Total NET: 160.1 mL          PHYSICAL EXAM:    General/Neuro: Alert & oriented x 3, no focal deficits. Equal sensation and strength b/l.  Pupils: Equal reactive to light  Lungs: Clear to auscultation, Normal expansion/effort.   Cardiovascular: Non-tachycardiac, no peripheral edema  GI: Abdomen soft, Non-tender, Non-distended.      Extremities: Extremities warm, pink, well-perfused.   Derm: Good skin turgor, no skin breakdown. No bruising on extremities.   : Voiding well. Last BM     LABS:  CAPILLARY BLOOD GLUCOSE                        14.8   9.39  )-----------( 127      ( 2023 05:35 )             46.5       04-25    140  |  109  |  22<H>  ----------------------------<  99  4.8   |  20  |  1.3    Ca    9.2      2023 05:35  Phos  2.5     04-25  Mg     1.9     04-25    TPro  6.5  /  Alb  3.9  /  TBili  0.8  /  DBili  0.2  /  AST  17  /  ALT  10  /  AlkPhos  56  04-25      PT/INR - ( 2023 10:50 )   PT: 13.60 sec;   INR: 1.19 ratio         PTT - ( 2023 10:50 )  PTT:34.1 sec  CARDIAC MARKERS ( 2023 05:35 )  x     / x     / 136 U/L / x     / x      CARDIAC MARKERS ( 2023 02:55 )  x     / <0.01 ng/mL / 144 U/L / x     / 6.1 ng/mL  CARDIAC MARKERS ( 2023 17:18 )  x     / <0.01 ng/mL / 220 U/L / x     / 8.7 ng/mL  CARDIAC MARKERS ( 2023 10:50 )  x     / x     / 227 U/L / x     / 8.7 ng/mL  CARDIAC MARKERS ( 2023 09:30 )  x     / <0.01 ng/mL / x     / x     / x          Urinalysis Basic - ( 2023 03:04 )    Color: Yellow / Appearance: Clear / S.025 / pH: x  Gluc: x / Ketone: Trace  / Bili: Negative / Urobili: <2 mg/dL   Blood: x / Protein: Trace / Nitrite: Negative   Leuk Esterase: Negative / RBC: x / WBC x   Sq Epi: x / Non Sq Epi: x / Bacteria: x

## 2023-04-25 NOTE — OCCUPATIONAL THERAPY INITIAL EVALUATION ADULT - NSOTDISCHREC_GEN_A_CORE
Patient requires supervision with activities of daily living. OT recommends D/C to home with assistance. OT recommends commode/tub bench/shower chair for D/C to home. Refer to evaluation/treatment for details.

## 2023-04-25 NOTE — PHYSICAL THERAPY INITIAL EVALUATION ADULT - GENERAL OBSERVATIONS, REHAB EVAL
pt. encountered in bed in NAD + tele, +IV. OT present for evaluation as well. pt c/o increased dizziness with position changes. He c/o increased dizziness after ambulation and requested to return to bed. [id number]

## 2023-04-25 NOTE — PROGRESS NOTE ADULT - ASSESSMENT
73M PMH of CHF (EF 30-35% in 12/2022) with AICD ([employer] MRI XT DR), CAD s/p PCI (x5 2006) s/p CABG 12/2022, HTN, COPD (no home O2) presenting s/p syncopal fall +HT, +LOC, -AC (on ASA 81mg). GCS 15, A&Ox3, no external signs of trauma. No focal neurologic deficits on physical examination. On workup found to have b/l Frontal SDH, SAH along falx with no ML shift and what appears to be a meningioma    Plan    #SDH/SAH, Meningioma  - Care per SICU  - Neurosurgery consult appreciated       - Keppra (1g load, 500 BID)      - No need for CTA      - q1 neurochecks      - rpt CTH 4/25 AM  - hold ASA and DVT PPX  - PPI  - BP < 150    #Syncopal fall  - 24H telemetry workup  - trend troponin given significant cardiac history | trop neg x2  - f/u BNP - 615  - echocardiogram  - orthostatic BP measurement   - f/u EP consult  - f/u completion CT C/A/P - neg    #DO (Cr 1.7 < 0.9)  - f/u CK levels  - c/w gentle fluid rehydration  - f/u [place]  - holding sacubtril given DO    Trauma/ACS  Spectra [id number]

## 2023-04-25 NOTE — PHYSICAL THERAPY INITIAL EVALUATION ADULT - PERTINENT HX OF CURRENT PROBLEM, REHAB EVAL
73M PMH of CHF (EF 30-35% in 12/2022) with AICD ([facility] MRI XT DR), CAD s/p PCI (x5 2006) s/p CABG 12/2022, HTN, COPD (no home O2) presenting s/p syncopal fall +HT, +LOC, -AC (on ASA 81mg). GCS 15, A&Ox3, no external signs of trauma. No focal neurologic deficits on physical examination. On workup found to have b/l Frontal SDH, SAH along falx with no ML shift and what appears to be a meningioma.  HCT x3 stable bleed

## 2023-04-26 ENCOUNTER — TRANSCRIPTION ENCOUNTER (OUTPATIENT)
Age: 73
End: 2023-04-26

## 2023-04-26 VITALS — WEIGHT: 169.98 LBS | HEIGHT: 70.98 IN

## 2023-04-26 PROCEDURE — 71045 X-RAY EXAM CHEST 1 VIEW: CPT | Mod: 26

## 2023-04-26 PROCEDURE — 93010 ELECTROCARDIOGRAM REPORT: CPT

## 2023-04-26 PROCEDURE — 99291 CRITICAL CARE FIRST HOUR: CPT | Mod: 24,25

## 2023-04-26 RX ORDER — SENNA PLUS 8.6 MG/1
2 TABLET ORAL AT BEDTIME
Refills: 0 | Status: DISCONTINUED | OUTPATIENT
Start: 2023-04-26 | End: 2023-04-26

## 2023-04-26 RX ORDER — ASPIRIN/CALCIUM CARB/MAGNESIUM 324 MG
1 TABLET ORAL
Refills: 0 | DISCHARGE

## 2023-04-26 RX ORDER — AMIODARONE HYDROCHLORIDE 400 MG/1
1 TABLET ORAL
Qty: 18 | Refills: 0
Start: 2023-04-26

## 2023-04-26 RX ORDER — AMLODIPINE BESYLATE 2.5 MG/1
1 TABLET ORAL
Refills: 0 | DISCHARGE

## 2023-04-26 RX ORDER — SACUBITRIL AND VALSARTAN 24; 26 MG/1; MG/1
1 TABLET, FILM COATED ORAL EVERY 12 HOURS
Refills: 0 | Status: DISCONTINUED | OUTPATIENT
Start: 2023-04-26 | End: 2023-04-26

## 2023-04-26 RX ORDER — LEVETIRACETAM 250 MG/1
1 TABLET, FILM COATED ORAL
Qty: 60 | Refills: 0
Start: 2023-04-26

## 2023-04-26 RX ADMIN — HEPARIN SODIUM 5000 UNIT(S): 5000 INJECTION INTRAVENOUS; SUBCUTANEOUS at 15:32

## 2023-04-26 RX ADMIN — AMIODARONE HYDROCHLORIDE 200 MILLIGRAM(S): 400 TABLET ORAL at 05:23

## 2023-04-26 RX ADMIN — Medication 50 MILLIGRAM(S): at 05:24

## 2023-04-26 RX ADMIN — LEVETIRACETAM 500 MILLIGRAM(S): 250 TABLET, FILM COATED ORAL at 05:24

## 2023-04-26 RX ADMIN — SACUBITRIL AND VALSARTAN 1 TABLET(S): 24; 26 TABLET, FILM COATED ORAL at 10:45

## 2023-04-26 RX ADMIN — HEPARIN SODIUM 5000 UNIT(S): 5000 INJECTION INTRAVENOUS; SUBCUTANEOUS at 05:23

## 2023-04-26 NOTE — DISCHARGE NOTE NURSING/CASE MANAGEMENT/SOCIAL WORK - PATIENT PORTAL LINK FT
You can access the FollowMyHealth Patient Portal offered by [facility] by registering at the following website: [email or web address]. By joining [facility]’s FollowMyHealth portal, you will also be able to view your health information using other applications (apps) compatible with our system.

## 2023-04-26 NOTE — DIETITIAN INITIAL EVALUATION ADULT - OTHER INFO
Pertinent Medical Information: Pt presented s/p syncopal fall. Noted B/L Frontal SDH, SAH along falx with no midline shift. DO noted.    PMH includes CHF (EF 30-35% in 12/2022) with AICD ([facility] XT DR), CAD s/p PCI (x5 2006) s/p CABG 12/2022, HTN, COPD.

## 2023-04-26 NOTE — DISCHARGE NOTE PROVIDER - NSDCFUSCHEDAPPT_GEN_ALL_CORE_FT
Calvin Laguerre  [facility]  ELECTROPH [address]  Scheduled Appointment: 05/25/2023    [facility]  CARDIOLOGY [address]  Scheduled Appointment: 05/26/2023    [facility]  CARDIOLOGY [address]  Scheduled Appointment: 06/01/2023    Alexi Elizalde  [facility]  CARDIOLOGY [address]  Scheduled Appointment: 06/12/2023

## 2023-04-26 NOTE — DIETITIAN INITIAL EVALUATION ADULT - NUTRITIONGOAL OUTCOME1
Pt to demonstrate tolerance to diet order, with at least 75% po intake achieved & maintained over next 5-7 days.    Pt at moderate nutrition risk; RD to follow-up in 5-7 days.    Monitor: Skin, labs, BM, wt, nutrition focused physical findings, body composition, diet order, GI.

## 2023-04-26 NOTE — DISCHARGE NOTE PROVIDER - ATTENDING ATTESTATION STATEMENT
Abdomen , soft, nontender, nondistended , no guarding or rigidity , no masses palpable , normal bowel sounds , Liver and Spleen , no hepatomegaly present , no hepatosplenomegaly , liver nontender , spleen not palpable
I have personally seen and examined the patient. I have collaborated with and supervised the

## 2023-04-26 NOTE — DISCHARGE NOTE PROVIDER - CARE PROVIDER_API CALL
Shant Mayo)  Cardiovascular Disease; Internal Medicine; Interventional Cardiology; Nuclear Cardiology  [address] [place] [address], [address]  [place] [address]  Phone: [phone]  Fax: [phone]  Follow Up Time: 1 week    Siobhan Aguilar)  Neurosurgery  [address] [place] [address], [address]  [place] [address]  Phone: [phone]  Fax: [phone]  Follow Up Time: 1 week

## 2023-04-26 NOTE — DISCHARGE NOTE PROVIDER - HOSPITAL COURSE
73yM PMH of CHF (EF 30-35% in 12/2022) with AICD ([facility] XT ), CAD s/p PCI (x5 2006) s/p CABG 12/2022, HTN, COPD (no home O2) presenting s/p syncopal fall +HT, +LOC, -AC (on ASA 81mg). Patient states that he was running late to play golf today and was running with his clubs when he syncopized and fell backward. The fall was witnessed by bystanders and states that he lost consciousness for a few minutes. No loss of fecal or urinary continence. No confusion after episode. Patient only complaining of headache and cervical spinal tenderness. Patient had no chest pain, dyspnea, +dizziness (felt the room spinning).    Imaging revealed acute SDHs along the cerebral falx and bifrontal convexities (R>L) with no midline shift. Additionally, there were scattered SAH hemorrhages in the interhemispheric fissure and bilateral cerebral sulci. Re-demonstration of a 2cm planum sphenoidale meningioma with slightly increased peritumoral edema was also appreciated. Repeat imaging has remained stable.     Throughout his course, his headaches and dizziness have steadily improved.     During his work-up, an echo was obtained which demonstrated an EF 20-25% with severely decreased global left ventricular systolic function, mildly enlarged right ventricle , mildly reduced RV systolic function, trace MVR, mildly enlarged right atrium, and LV apical thrombus. Cardiology, EP, and neurosurgery followed the patient. Per neurosurgery, patient is to hold AP/AC for the next two weeks. Cardiology aware as well as EP. Patient will have close follow up outpatient and these specialties will be managing his AP/AC medications.    He will follow up with Dr. Laguerre as scheduled on 5/25. And will follow up with neurosurgery sooner.    He walked 100 feet with PT with rolling walker. They recommended discharge to home with assistance when medically appropriate.    Patient will be discharged home.

## 2023-04-26 NOTE — DIETITIAN INITIAL EVALUATION ADULT - NSPROEDAREADYLEARN_GEN_A_NUR
Reviewed current diet order. Encouraged adequate po intake. Discussed heart healthy nutrition therapy concepts.

## 2023-04-26 NOTE — DISCHARGE NOTE PROVIDER - PROVIDER TOKENS
PROVIDER:[TOKEN:[[id number]],FOLLOWUP:[1 week]],PROVIDER:[TOKEN:[[id number]],FOLLOWUP:[1 week]]

## 2023-04-26 NOTE — DIETITIAN INITIAL EVALUATION ADULT - PERTINENT LABORATORY DATA
04-25    138  |  109  |  21<H>  ----------------------------<  86  4.7   |  18  |  1.2    Ca    8.8      25 Apr 2023 21:01  Phos  2.7     04-25  Mg     2.0     04-25    TPro  6.5  /  Alb  3.9  /  TBili  0.8  /  DBili  0.2  /  AST  17  /  ALT  10  /  AlkPhos  56  04-25

## 2023-04-26 NOTE — DIETITIAN INITIAL EVALUATION ADULT - ORAL INTAKE PTA/DIET HISTORY
Reportedly follows a regular diet PTP. Reports fair appetite & po intake PTP. Consumes 3 meals/day. No supplements reported. NKFA. No food preferences reported. No dietary restrictions reported. UBW reported to be 72.7 kg with no known h/o unintentional wt loss. No signs of significant muscle wasting/subcutaneous fat loss.

## 2023-04-26 NOTE — DISCHARGE NOTE PROVIDER - NSFOLLOWUPCLINICS_GEN_ALL_ED_FT
[facility]  Trauma Surgery  [address] [place] [address]  Phone: [phone]  Fax:

## 2023-04-26 NOTE — DIETITIAN INITIAL EVALUATION ADULT - ETIOLOGY
Abeba García is a 21year old female [facility] Patient's last menstrual period was 08/18/2017 (approximate).  Patient presents with:  Wellness Visit: annual  .  Patient was diagnosed with BV 2 times in the past year, c/o vaginal discharge with odor n back pain. Skin/Breast:  Denies any breast pain, lumps, or discharge. Neurological:  denies headaches, extremity weakness or numbness. Psychiatric: denies depression or anxiety. Endocrine:   denies excessive thirst or urination.   Heme/Lymph:  denies h reduced appetite at this time

## 2023-04-26 NOTE — DISCHARGE NOTE NURSING/CASE MANAGEMENT/SOCIAL WORK - NSDCPETBCESMAN_GEN_ALL_CORE
normal... If you are a smoker, it is important for your health to stop smoking. Please be aware that second hand smoke is also harmful.

## 2023-04-26 NOTE — PROGRESS NOTE ADULT - ASSESSMENT
Assessment/Plan	  73y Male  s/p syncopal fall with b/l Frontal SDH, SAH along falx with no midline shift.  HCT x3 stable bleed    NEURO:  #acute frontal SDH/SAH    -HCT x3 stable bleed    -NSGY c/s recs: q4 neurochecks ,SBP <150, HOB elevated    -Keppra 500mg q12  #acute pain    -acetaminophen Tablet .. 650 milliGRAM(s) Oral every 6 hours PRN Temp greater or equal to 38C (100.4F), Mild Pain (1 - 3)     RESP:   #hx of COPD (no home o2)    -Albuterol     -maintain saturation >90%    #Activity    - Ambulate with assistance     CARDS:   #Acute syncopal workup    -Obtained orthostatic vitals    - Echo findings: Left Ventricle: Endocardial visualization was enhanced with intravenous echo contrast. Global LV systolic function was severely decreased. Left  ventricular ejection fraction, by visual estimation, is 20 to 25%. 2.5 x  1.3 cm filling defect on the contrast enhanced images consistent with LV  apical thrombus.    --Cardiology: needs full AC, will need cath when cleared by Neurosurgery  #acute episode of VTach on AICD interrogation    -episodes of SVT/Vtach seen on device terminated by shock x2, device functioning properly  Patient started on Amio as per EP recs    -EP c/s recs: Start Amiodarone, 150mg bolus f/b 1mg/min x 6 hours then 0.5mg/min x 18 hours per EP. When Amio drip completed, start PO 200mg BID     -F/u with Dr. Laguerre in 1month for eval for for outpatient EP study/ablation once patient can tolerate AC (currently only on ASA)  #Hx of MI (2006), CAD, Stents placed in 2006 AICD 2015,     -Metoprolol 50mg XL    -ASA 81mg holding per Neuro sx  #hx of CHF (EF 30-35%)    -Entresto HOLDING secondary to DO as per primary team    -Furosemide 20mg holding in setting of DO  #hx of HTN    -Amlodipine 5mg HOLDING until SBP stable  #hx of HLD    -Atorvastatin 40mg    -Ezetimibe 10mg   #Imaging  - ECHO findings: EF: 20 to 25%, 2.5 x 1.3 cm filling defect on the contrast enhanced images consistent with LV apical thrombus.  - EKG in ED shows sinus tachycardia with occasional Premature ventricular complexes  - TTE Echo with contrast (12/2021):  Severely decreased global left ventricular systolic function  #Labs    -Trop neg x3    -Trending pro-BNP    GI/NUTR:   #Regular diet, DASH    -aspiration precautions, HOB 30  #GI Prophylaxis    -not indicated  #Bowel regimen    -Not indicated, senna prn    /RENAL:   #voiding   #DO (baseline Cr 0.9)  --Restart Entresto when DO clears  -Continue with maintenance fluid  - CK downtrending currently 136  #maintain euvolemia    Labs:          BUN/Cr- 22/1.3  -->,  21/1.2  -->          Electrolytes-Na 138 // K 4.7 // Mg 2.0 //  Phos 2.7 (04-25 @ 21:01)      HEME/ONC:   #DVT prophylaxis    -SCDs    - Will f/u with neurosx for recommendations anticoagulation in setting of LV apical thrombus?    -Began Heparin SQ 5,000 U q8    - As per neuro sx hold all AC/AP x2 weeks    Labs: Hb/Hct:  14.8/46.5  -->,  14.2/44.6  -->                      Plts:  127  -->,  129  -->                 PTT/INR:      T&S Expires: 4/27    ID:  WBC- 9.95  --->>,  9.39  --->>,  9.85  --->>  Temp trend- 24hrs T(F): 98.2 (04-25 @ 23:59), Max: 98.2 (04-25 @ 23:59)    ENDO:    -FSG ACHS    -Glucose goal 140-180    -if above 180 start ISS    MSK:     Activity - Out of bed with assistance     -PT evaluation demonstrated dizziness, patient requested to remain supine; PT recommends to d/c home with assistance when appropriate    LINES/DRAINS:  PIV    ADVANCED DIRECTIVES:  Full Code    HCP/Emergency Contact- Martha (Wife) [phone]    INDICATION FOR SICU: Syncope and collapse, SDH q4 neurochecks, AICD interrogated with Vtach/SVT    DISPO:   Case discussed with attending Dr. Nobles Assessment/Plan	  73y Male  s/p syncopal fall with b/l Frontal SDH, SAH along falx with no midline shift.  HCT x3 stable bleed    NEURO:  #acute frontal SDH/SAH    -HCT x3 stable bleed    -NSGY c/s recs: q4 neurochecks ,SBP <150, HOB elevated    -Keppra 500mg q12  #acute pain    -acetaminophen Tablet .. 650 milliGRAM(s) Oral every 6 hours PRN Temp greater or equal to 38C (100.4F), Mild Pain (1 - 3)     RESP:   #hx of COPD (no home o2)    -Albuterol     -maintain saturation >90%    #Activity    - Ambulate with assistance     CARDS:   #Acute syncopal workup    - Spoke with cards fellow will not perform ischemic workup this admission, recommending close follow-up with outpatient cardiologist in 2 weeks    -Obtained orthostatic vitals    - Echo findings: Left Ventricle: Endocardial visualization was enhanced with intravenous echo contrast. Global LV systolic function was severely decreased. Left  ventricular ejection fraction, by visual estimation, is 20 to 25%. 2.5 x  1.3 cm filling defect on the contrast enhanced images consistent with LV  apical thrombus.    --Cardiology: needs full AC, will need cath when cleared by Neurosurgery  #acute episode of VTach on AICD interrogation    -episodes of SVT/Vtach seen on device terminated by shock x2, device functioning properly  Patient started on Amio as per EP recs    -EP c/s recs: Start Amiodarone, 150mg bolus f/b 1mg/min x 6 hours then 0.5mg/min x 18 hours per EP. When Amio drip completed, start PO 200mg BID     -F/u with Dr. Laguerre in 2 weeks for eval for for outpatient EP study/ablation once patient can tolerate AC  #Hx of MI (2006), CAD, Stents placed in 2006 AICD 2015     -Metoprolol 50mg XL    -ASA 81mg holding per Neuro sx x2 weeks  #hx of CHF (EF 30-35%)    -Restarted home Entresto    -Furosemide 20mg holding in setting of DO  #hx of HTN    -Amlodipine 5mg HOLDING until SBP stable  #hx of HLD    -Atorvastatin 40mg    -Ezetimibe 10mg   #Imaging  - ECHO findings: EF: 20 to 25%, 2.5 x 1.3 cm filling defect on the contrast enhanced images consistent with LV apical thrombus.  - EKG in ED shows sinus tachycardia with occasional Premature ventricular complexes  - TTE Echo with contrast (12/2021):  Severely decreased global left ventricular systolic function  #Labs    -Trop neg x3    GI/NUTR:   #Regular diet, DASH    -aspiration precautions, HOB 30  #GI Prophylaxis    -not indicated  #Bowel regimen    -Began senna    /RENAL:   #voiding   #DO (baseline Cr 0.9)  -Restarted Entresto  -IV lock as patient is tolerating PO diet   - CK downtrending currently 136, no longer trending  #maintain euvolemia    Labs:          BUN/Cr- 22/1.3  -->,  21/1.2  -->          Electrolytes-Na 138 // K 4.7 // Mg 2.0 //  Phos 2.7 (04-25 @ 21:01)      HEME/ONC:   #DVT prophylaxis    -SCDs    - Full anticoagulation recommended in 2 weeks per neurosurgery     -Began Heparin SQ 5,000 U q8    - As per neuro sx hold all AC/AP x2 weeks    Labs: Hb/Hct:  14.8/46.5  -->,  14.2/44.6  -->                      Plts:  127  -->,  129  -->                 PTT/INR:      T&S Expires: 4/27    ID:  WBC- 9.95  --->>,  9.39  --->>,  9.85  --->>  Temp trend- 24hrs T(F): 98.2 (04-25 @ 23:59), Max: 98.2 (04-25 @ 23:59)    ENDO:    -FSG ACHS    -Glucose goal 140-180    -if above 180 start ISS    MSK:     Activity - Out of bed with assistance     -PT evaluation demonstrated dizziness, patient requested to remain supine; PT recommends to d/c home with assistance when appropriate; will have PT evaluate today       LINES/DRAINS:  PIV    ADVANCED DIRECTIVES:  Full Code    HCP/Emergency Contact- Martha (Wife) [phone]    INDICATION FOR SICU: Syncope and collapse, SDH q4 neurochecks, AICD interrogated with Vtach/SVT    DISPO:   Case discussed with attending Dr. Nobles Assessment/Plan	  73y Male  s/p syncopal fall with b/l Frontal SDH, SAH along falx with no midline shift.  HCT x3 stable bleed    NEURO:  #acute frontal SDH/SAH    -HCT x3 stable bleed    -NSGY c/s recs: q4 neurochecks ,SBP <150, HOB elevated    -Keppra 500mg q12  #acute pain    -acetaminophen Tablet .. 650 milliGRAM(s) Oral every 6 hours PRN Temp greater or equal to 38C (100.4F), Mild Pain (1 - 3)     RESP:   #hx of COPD (no home o2)    -Albuterol     -maintain saturation >90%    #Activity    - Ambulate with assistance     CARDS:   #Acute syncopal workup    - Spoke with cards fellow will not perform ischemic workup this admission, recommending close follow-up with outpatient cardiologist in 2 weeks, Dr Laguerre as scheduled 5/25 @9:30     -Obtained orthostatic vitals    - Echo findings: Left Ventricle: Endocardial visualization was enhanced with intravenous echo contrast. Global LV systolic function was severely decreased. Left  ventricular ejection fraction, by visual estimation, is 20 to 25%. 2.5 x  1.3 cm filling defect on the contrast enhanced images consistent with LV  apical thrombus.    --Cardiology: needs full AC, will need cath when cleared by Neurosurgery  #acute episode of VTach on AICD interrogation    -episodes of SVT/Vtach seen on device terminated by shock x2, device functioning properly  Patient started on Amio as per EP recs    -EP c/s recs: Start Amiodarone, 150mg bolus f/b 1mg/min x 6 hours then 0.5mg/min x 18 hours per EP. When Amio drip completed, start PO 200mg BID     -F/u with Dr. Laguerre in 2 weeks for eval for for outpatient EP study/ablation once patient can tolerate AC  #Hx of MI (2006), CAD, Stents placed in 2006 AICD 2015     -Metoprolol 50mg XL    -ASA 81mg holding per Neuro sx x2 weeks  #hx of CHF (EF 30-35%)    -Restarted home Entresto    -Furosemide 20mg holding in setting of DO  #hx of HTN    -Amlodipine 5mg HOLDING until SBP stable  #hx of HLD    -Atorvastatin 40mg    -Ezetimibe 10mg   #Imaging  - ECHO findings: EF: 20 to 25%, 2.5 x 1.3 cm filling defect on the contrast enhanced images consistent with LV apical thrombus.  - EKG in ED shows sinus tachycardia with occasional Premature ventricular complexes  - TTE Echo with contrast (12/2021):  Severely decreased global left ventricular systolic function  #Labs    -Trop neg x3    GI/NUTR:   #Regular diet, DASH    -aspiration precautions, HOB 30  #GI Prophylaxis    -not indicated  #Bowel regimen    -Began senna    /RENAL:   #voiding   #DO (baseline Cr 0.9)  -Restarted Entresto  -IV lock as patient is tolerating PO diet   - CK downtrending currently 136, no longer trending  #maintain euvolemia    Labs:          BUN/Cr- 22/1.3  -->,  21/1.2  -->          Electrolytes-Na 138 // K 4.7 // Mg 2.0 //  Phos 2.7 (04-25 @ 21:01)      HEME/ONC:   #DVT prophylaxis    -SCDs    - Full anticoagulation recommended in 2 weeks per neurosurgery     -Began Heparin SQ 5,000 U q8    - As per neuro sx hold all AC/AP x2 weeks    Labs: Hb/Hct:  14.8/46.5  -->,  14.2/44.6  -->                      Plts:  127  -->,  129  -->                 PTT/INR:      T&S Expires: 4/27    ID:  WBC- 9.95  --->>,  9.39  --->>,  9.85  --->>  Temp trend- 24hrs T(F): 98.2 (04-25 @ 23:59), Max: 98.2 (04-25 @ 23:59)    ENDO:    -FSG ACHS    -Glucose goal 140-180    -if above 180 start ISS    MSK:     Activity - Out of bed with assistance     -PT evaluation demonstrated dizziness, patient requested to remain supine; PT recommends to d/c home with assistance when appropriate; will have PT evaluate today       LINES/DRAINS:  PIV    ADVANCED DIRECTIVES:  Full Code    HCP/Emergency Contact- Martha (Wife) [phone]    INDICATION FOR SICU: Syncope and collapse, SDH q4 neurochecks, AICD interrogated with Vtach/SVT    DISPO:   Case discussed with attending Dr. Nobles

## 2023-04-26 NOTE — DISCHARGE NOTE PROVIDER - NSDCCPCAREPLAN_GEN_ALL_CORE_FT
PRINCIPAL DISCHARGE DIAGNOSIS  Diagnosis: Syncope  Assessment and Plan of Treatment: Follow Up with Dr. Aguilar (Neurosurgery) and Dr. Mayo (Cardiology) in 1 week. Please call office for confirmation of your appointment.  Additionally, please follow up with your PCP as soon as possible. Additionally, follow up with Dr. Laguerre as scheduled.  Please do not take your aspirin as it is purposefully being held. Neurosurgery and Cardiology will manage your medications.  Continue Keppra until neurosurgery states otherwise.  Diet: DASH diet recommended  Pain: You can take over the counter medications such as Tylenol. Please adhere to the instructions on the back of the bottle.   If you develop fevers, chills, worsening pain, nausea that won't subside, vomiting, or any other symptoms of concern, please call MD for further advice, evaluation, and/or treatment.  Activity: Ambulate and get out of bed as tolerated, and with assistance if feeling weak.      SECONDARY DISCHARGE DIAGNOSES  Diagnosis: Traumatic subdural hematoma with loss of consciousness  Assessment and Plan of Treatment:     Diagnosis: SAH (subarachnoid hemorrhage)  Assessment and Plan of Treatment:

## 2023-04-26 NOTE — DISCHARGE NOTE PROVIDER - NSDCMRMEDTOKEN_GEN_ALL_CORE_FT
amiodarone 200 mg oral tablet: 1 tab(s) orally every 12 hours  amiodarone 200 mg oral tablet: 1 tab(s) orally once a day  amLODIPine 5 mg oral tablet: 1 orally once a day  atorvastatin 40 mg oral tablet: 1 tab(s) orally once a day  Entresto 24 mg-26 mg oral tablet: 1 orally 2 times a day  ezetimibe 10 mg oral tablet: 1 orally once a day  famotidine 20 mg oral tablet: 1 tab(s) orally 2 times a day  K-Tab 10 mEq oral tablet, extended release: 1 tab(s) orally once a day   Lasix 40 mg oral tablet: 1 tab(s) orally once a day   levETIRAcetam 500 mg oral tablet: 1 tab(s) orally 2 times a day  metoprolol succinate 50 mg oral tablet, extended release: 1 orally once a day  polyethylene glycol 3350 oral powder for reconstitution: 17 gram(s) orally once a day  senna oral tablet: 2 tab(s) orally once a day  Ventolin HFA 90 mcg/inh inhalation aerosol: 2 puff(s) inhaled 4 times a day, As Needed   amiodarone 200 mg oral tablet: 1 tab(s) orally every 12 hours  amiodarone 200 mg oral tablet: 1 tab(s) orally once a day  atorvastatin 40 mg oral tablet: 1 tab(s) orally once a day  Entresto 24 mg-26 mg oral tablet: 1 orally 2 times a day  ezetimibe 10 mg oral tablet: 1 orally once a day  famotidine 20 mg oral tablet: 1 tab(s) orally 2 times a day  K-Tab 10 mEq oral tablet, extended release: 1 tab(s) orally once a day   Lasix 40 mg oral tablet: 1 tab(s) orally once a day   levETIRAcetam 500 mg oral tablet: 1 tab(s) orally 2 times a day  metoprolol succinate 50 mg oral tablet, extended release: 1 orally once a day  polyethylene glycol 3350 oral powder for reconstitution: 17 gram(s) orally once a day  senna oral tablet: 2 tab(s) orally once a day  Ventolin HFA 90 mcg/inh inhalation aerosol: 2 puff(s) inhaled 4 times a day, As Needed

## 2023-04-26 NOTE — DIETITIAN INITIAL EVALUATION ADULT - ADD RECOMMEND
Recommendation:  (1) Continue DASH/TLC diet as tolerated.  (2) Order Ensure Plus High Protein once daily (350 kcal, 20 g protein).

## 2023-04-26 NOTE — PROGRESS NOTE ADULT - ATTENDING COMMENTS
Critical Care: [id number]   This patient has a high probability of sudden, clinically significant deterioration, which requires the highest level of physician preparedness to intervene urgently. I managed/supervised life or organ supporting interventions that required frequent physician assessment. I devoted my full attention in the ICU to the direct care of this patient for the period of time indicated below. Time I spent with the family or surrogate(s) is included only if the patient was incapable of providing the necessary information or participating in medical decision making. Time devoted to teaching and to any procedures I billed separately is not included.     ZUNILDA DENNIS 73y Male admitted to [x ] SICU /[ ] SDU with syncopal fall, sustained SDH, upon interrogation of ICD episodes of V.tach detected, borderline hypotension, high risk for hemodynamic instability, airway at risk for obstruction, electrolytes abnormalities , DO with Cr 1.7 double from the baseline and now improving  Patient is examined and evaluated at the bedside with SICU team. Treatment plan discussed with SICU team, nurses and primary team.   Chest X-ray and all relevant studies reviewed during rounds.  Will continue hemodynamic monitoring as per protocol in SICU.    Neuro:  GCS [15 ] AAOx3  [ x]  Neurovascular checks as per SICU protocol, repeat NCHCT->stable SDH/SAH, neurosurgery follow up                 [ ] 3% NaCl                     Paralysis [ ] Yes  [x ]  No      Sedated/Pain control with                 [ ] Dilaudid drip, [ ]  Ketamine drip, [ ] Fentanyl drip, [ ] Propofol, [ ] Precedex, [ ] Versed drip, [ ] Ativan drip,                           [ ] OxyContin standing,  [ ] OxyContin PRN, [ ] Dilaudid PRN pushes, [ ] Fentanyl PRN pushes, [ ] PCA,                [x ] Tylenol IV/PO, [x ] Gabapentin, [ ]  Ketorolac, [ ] Tramadol,  [ ] Lidoderm Patch       Other Medications               [ ] Seroquel, [ ] Zyprexa, [ ] Haldol,  [ ] Clonazepam [ ] Xanax, [ ] Versed/Ativan PRN, [ ] Valium [ x] None               [ ] Robaxin   [ ] Baclofen  [ ] Flexeril               [x ] Keppra  [ ] Lamictal  [ ] Depakote  [ ] Dilantin               [ ] CIWA (Ativan/Valium/ Librium)  CV: continue to monitor, cardiology follow up, rate control as needed, SBP goal <150, ECHO completed which demonstrated apical thrombus and declining EF to 25%  On pressors [ ]  Yes  [x ]  No          [ ]  Levophed, [ ] Wes-Synephrine, [ ] Vasopressin, [ ]  Epinephrine          [ ] Dobutamine, [ ] Milrinone, [ ]  Midodrine,  [ ] Others    Other Cardiac Meds          [x ] Amiodarone IV/PO-> change to PO, [ ] Digoxin, [ ] Cardizem drip, [ ] Cleviprex drip, [ ] Esmolol drip, [ ] Cardene drip  Respiratory: Acute respiratory insufficiency -> continue monitoring                        None Invasive Support  [x ] Incentive Spirometer                                  [ ] BiPAP   [ ] CPAP/NIV   [ ] HFNC   [ ] NR Face Mask   [ ] NC  [ ] Trach Caller [x] Room Air                        Ventilatory support  [ ] Yes [x ] No                            [ ] SBT                                  [ ] PC    [ ] VC   [ ] AC/PRVC   [ ] BiVent/APRV   [ ]CPAP   GI  [x ]  bowel regiment  [x ] BM + [ x] Flatus +             [ ] Ostomy   [ ] NG tube        Prophylaxis [x ] PPI  [ ] H2 Blockers  [ ] Others  Nutrition: continue   [x ] Diet DASH  [ ] TPN/PPN   [ ] calories count   [ ]  Tube Feeds     Renal: Continue I&Os monitoring, Oshea catheter  [ ] Yes,  [x ]   No ,  [ ] Consideration for discontinuation [ ] Taxes cath/PrimaFit  [ ] TOV  [ ] HD/CVVH       Lytes/Acid-base: replete hypokalemia, hypomagnesemia, hypocalcemia, hypophosphatemia        IV Fluids   [ ] LR,  [ x] NS@10ml/hr  [ ] D5W1/2NS [ ] Bicarbonate Drip  [ ] Albumin [ ] Lasix  ID: leukocytosis -> continue to monitor:         IV Abx [ ] Yes, [ x] No;  ID consulted [ ] Yes, [ x] No        Cultures send  [ ] Respiratory   [ ] Blood   [ ] Urine  [ ] Fluids  [ ] Tissue  [x ]  None  Lines:   [ ] Right   [ ] Left  [ ] Bilateral                     [ ] Subclavian TLC        [ ]  Internal Jugular TLC     [ ]  Femoral TLC                     [ ] Subclavian Cordis    [ ] Internal Jugular Cordis   [ ] Femoral Cordis                     [ ] HD catheter    [ ] PICC     [ ]  Midline   [x ] Peripheral IVs                                                 [ ] Right   [ ] Left   [x ] None                     [ ] Radial A-Line    [ ] Femoral A-Line   [ ] Axillary A-Line               Heme: continue to evaluate for acute blood loss anemia- trend Hg/Hct                     AC Reversal Indicated [ ] Yes  [x ] No                                      [ ] Kcentra,  [ ] 3Factors concentrate, [ ] Adnexxa                     Transfused  indicated  [ ] Yes, [x ] No    [ ] PRBCs   [ ] Platelets   [ ] FFPs   [ ] Cryoprecipitate                    Should be started on or continued with  following  [ ] Yes,  [x ] No                               [ ] Lovenox  [ ] Coumadin  [ ] Heparin drip  [ ] NOVACs  [ ] ASA  [ ] Antiplatelets   Endocrine: Prevent and treat hyperglycemia with insulin as needed,                                 Insuline drip [ ] Yes, [x ] No   PV: follow pulse exam  Skin: decub precautions  DVT Prophylaxis:  [ x] SCDs  [ x] Heparin SQ  [ ] Lovenox  SQ  Stress Gastritis Prophylaxis: PPI/H2 Blockers if indicated  Mobility: patient is evaluated at the bedside with mobility team and the goals for today are discussed with PT [x ] bed rest->out of bed to ambulate    PATIENT/FAMILY/SURROGATE CONFERENCE:  [x ] Yes with patient at the bedside. [ ] No  PURPOSE: To obtain necessary information, To discuss treatment options under consideration today.    I saw and evaluated the patient personally. I have reviewed and agree with note above. Treatment plan discussed with SICU team, nurses and primary team at the time of the multidisciplinary rounds. The above note is NOT written at the time of rounds and will reflect all changes throughout management of the patient for the day note is written for.    Clary Nobles MD, FACS  Trauma/ACS/SCC Attending
management per SICU.
Critical Care: [id number]   This patient has a high probability of sudden, clinically significant deterioration, which requires the highest level of physician preparedness to intervene urgently. I managed/supervised life or organ supporting interventions that required frequent physician assessment. I devoted my full attention in the ICU to the direct care of this patient for the period of time indicated below. Time I spent with the family or surrogate(s) is included only if the patient was incapable of providing the necessary information or participating in medical decision making. Time devoted to teaching and to any procedures I billed separately is not included.     ZUNILDA DENNIS 73y Male admitted to [x ] SICU /[ ] SDU with syncopal fall, sustained SDH, upon interrogation of ICD episodes of V.tach detected, borderline hypotension, high risk for hemodynamic instability, airway at risk for obstruction, electrolytes abnormalities , DO with Cr 1.7 double from the baseline  Patient is examined and evaluated at the bedside with SICU team. Treatment plan discussed with SICU team, nurses and primary team.   Chest X-ray and all relevant studies reviewed during rounds.  Will continue hemodynamic monitoring as per protocol in SICU.    Neuro:  GCS [15 ] AAOx3  [ x]  Neurovascular checks as per SICU protocol, repeat NCHCT->stable SDH/SAH, neurosurgery follow up                 [ ] 3% NaCl                     Paralysis [ ] Yes  [x ]  No      Sedated/Pain control with                 [ ] Dilaudid drip, [ ]  Ketamine drip, [ ] Fentanyl drip, [ ] Propofol, [ ] Precedex, [ ] Versed drip, [ ] Ativan drip,                           [ ] OxyContin standing,  [ ] OxyContin PRN, [ ] Dilaudid PRN pushes, [ ] Fentanyl PRN pushes, [ ] PCA,                [x ] Tylenol IV/PO, [x ] Gabapentin, [ ]  Ketorolac, [ ] Tramadol,  [ ] Lidoderm Patch       Other Medications               [ ] Seroquel, [ ] Zyprexa, [ ] Haldol,  [ ] Clonazepam [ ] Xanax, [ ] Versed/Ativan PRN, [ ] Valium [ x] None               [ ] Robaxin   [ ] Baclofen  [ ] Flexeril               [x ] Keppra  [ ] Lamictal  [ ] Depakote  [ ] Dilantin               [ ] CIWA (Ativan/Valium/ Librium)  CV: continue to monitor, cardiology follow up, rate control as needed, SBP goal <150  On pressors [ ]  Yes  [x ]  No          [ ]  Levophed, [ ] Wes-Synephrine, [ ] Vasopressin, [ ]  Epinephrine          [ ] Dobutamine, [ ] Milrinone, [ ]  Midodrine,  [ ] Others    Other Cardiac Meds          [x ] Amiodarone IV/PO, [ ] Digoxin, [ ] Cardizem drip, [ ] Cleviprex drip, [ ] Esmolol drip, [ ] Cardene drip  Respiratory: Acute respiratory insufficiency -> continue monitoring                        None Invasive Support  [x ] Incentive Spirometer                                  [ ] BiPAP   [ ] CPAP/NIV   [ ] HFNC   [ ] NR Face Mask   [ ] NC  [ ] Trach Caller [x] Room AIr                        Ventilatory support  [ ] Yes [x ] No                            [ ] SBT                                  [ ] PC    [ ] VC   [ ] AC/PRVC   [ ] BiVent/APRV   [ ]CPAP   GI  [x ]  bowel regiment  [x ] BM + [ x] Flatus +             [ ] Ostomy   [ ] NG tube        Prophylaxis [x ] PPI  [ ] H2 Blockers  [ ] Others  Nutrition: continue   [x ] Diet DASH  [ ] TPN/PPN   [ ] calories count   [ ]  Tube Feeds     Renal: Continue I&Os monitoring, Oshea catheter  [ ] Yes,  [x ]   No ,  [ ] Consideration for discontinuation [ ] Taxes cath/PrimaFit  [ ] TOV  [ ] HD/CVVH       Lytes/Acid-base: replete hypokalemia, hypomagnesemia, hypocalcemia, hypophosphatemia        IV Fluids   [ ] LR,  [ x] NS@70ml/hr  [ ] D5W1/2NS [ ] Bicarbonate Drip  [ ] Albumin [ ] Lasix  ID: leukocytosis -> continue to monitor:         IV Abx [ ] Yes, [ x] No;  ID consulted [ ] Yes, [ x] No        Cultures send  [ ] Respiratory   [ ] Blood   [ ] Urine  [ ] Fluids  [ ] Tissue  [x ]  None  Lines:   [ ] Right   [ ] Left  [ ] Bilateral                     [ ] Subclavian TLC        [ ]  Internal Jugular TLC     [ ]  Femoral TLC                     [ ] Subclavian Cordis    [ ] Internal Jugular Cordis   [ ] Femoral Cordis                     [ ] HD catheter    [ ] PICC     [ ]  Midline   [x ] Peripheral IVs                                                 [ ] Right   [ ] Left   [x ] None                     [ ] Radial A-Line    [ ] Femoral A-Line   [ ] Axillary A-Line               Heme: continue to evaluate for acute blood loss anemia- trend Hg/Hct                     AC Reversal Indicated [ ] Yes  [x ] No                                      [ ] Kcentra,  [ ] 3Factors concentrate, [ ] Adnexxa                     Transfused  indicated  [ ] Yes, [x ] No    [ ] PRBCs   [ ] Platelets   [ ] FFPs   [ ] Cryoprecipitate                    Should be started on or continued with  following  [ ] Yes,  [x ] No                               [ ] Lovenox  [ ] Coumadin  [ ] Heparin drip  [ ] NOVACs  [ ] ASA  [ ] Antiplatelets   Endocrine: Prevent and treat hyperglycemia with insulin as needed,                                 Insuline drip [ ] Yes, [x ] No   PV: follow pulse exam  Skin: decub precautions  DVT Prophylaxis:  [ x] SCDs  [ ] Heparin SQ  [ ] Lovenox  SQ  Stress Gastritis Prophylaxis: PPI/H2 Blockers if indicated  Mobility: patient is evaluated at the bedside with mobility team and the goals for today are discussed with PT [x ] bed rest    PATIENT/FAMILY/SURROGATE CONFERENCE:  [x ] Yes with patient at the bedside. [ ] No  PURPOSE: To obtain necessary information, To discuss treatment options under consideration today.    I saw and evaluated the patient personally. I have reviewed and agree with note above. Treatment plan discussed with SICU team, nurses and primary team at the time of the multidisciplinary rounds. The above note is NOT written at the time of rounds and will reflect all changes throughout management of the patient for the day note is written for.    Clary Nobles MD, FACS  Trauma/ACS/SCC Attending

## 2023-04-26 NOTE — DISCHARGE NOTE PROVIDER - CARE PROVIDERS DIRECT ADDRESSES
[email or web address]

## 2023-04-26 NOTE — CHART NOTE - NSCHARTNOTEFT_GEN_A_CORE
Electrophysiology PA Note     tele reviewed, EKG reviewed, QTc stable  Echo findings noted LV thrombus  start therapeutic AC as soon as cleared by Neurosurgery (2 weeks)  recall EP prn  follow up with Dr Laguerre as scheduled 5/25 @9:30   [address] [place] [address], [address]  [phone]

## 2023-04-26 NOTE — DIETITIAN INITIAL EVALUATION ADULT - PERTINENT MEDS FT
MEDICATIONS  (STANDING):  aMIOdarone    Tablet 200 milliGRAM(s) Oral every 12 hours  aMIOdarone IVPB 150 milliGRAM(s) IV Intermittent once  atorvastatin 40 milliGRAM(s) Oral at bedtime  chlorhexidine 2% Cloths 1 Application(s) Topical <User Schedule>  heparin   Injectable 5000 Unit(s) SubCutaneous every 8 hours  levETIRAcetam 500 milliGRAM(s) Oral two times a day  metoprolol succinate ER 50 milliGRAM(s) Oral daily  sacubitril 24 mG/valsartan 26 mG 1 Tablet(s) Oral every 12 hours  senna 2 Tablet(s) Oral at bedtime    MEDICATIONS  (PRN):  acetaminophen     Tablet .. 650 milliGRAM(s) Oral every 6 hours PRN Temp greater or equal to 38C (100.4F), Mild Pain (1 - 3)  albuterol    90 MICROgram(s) HFA Inhaler 2 Puff(s) Inhalation every 6 hours PRN Shortness of Breath and/or Wheezing

## 2023-04-26 NOTE — DISCHARGE NOTE NURSING/CASE MANAGEMENT/SOCIAL WORK - NSDCPEFALRISK_GEN_ALL_CORE
For information on Fall & Injury Prevention, visit: [email or web address] OR  [email or web address] OR  https://www.cdc.gov/steadi/patient.html

## 2023-04-26 NOTE — PROGRESS NOTE ADULT - SUBJECTIVE AND OBJECTIVE BOX
ZUNILDA DENNIS   [id number]   [birth year]  73yM    Admit Date: 04-24-23  Indication for SDU: s/p syncopal fall with b/l Frontal SDH, SAH along falx with no midline shift.        ============================  HPI:  73M PMH of CHF (EF 30-35% in 12/2022) with AICD (Evera MRI XT ), CAD s/p PCI (x5 2006) s/p CABG 12/2022, HTN, COPD (no home O2) presenting s/p syncopal fall +HT, +LOC, -AC (on ASA 81mg). Patient states that he was running late to play golf today and was running with his clubs when he syncopized and fell backward. The fall was witnessed by bystanders and states that he lost consciousness for a few minutes. No loss of fecal or urinary continence. No confusion after episode. Patient only complaining of headache and cervical spinal tenderness. Patient has voided since in ED. Patient had no chest pain, dyspnea, +dizziness (felt the room spinning). SICU consult, seen and examined. Patient denies beginning new medications, and states he ate breakfast this AM. Patient states his cardiologist started him on an "anti-dizziness medication" but cannot remember the name and was unable to locate in home medication list. Patient has discontinued DAPT per cardiologist as stents were placed in 2006.        24 Hour Events  4/25  NIGHT  -PM rounds: ANOx3, no obvious focal neurological deficit. Patient's HA improving but still dizzy when moving around   -Phos repleted    Day  -f/u repeat CTH ---> stable   -PO amio 200 BID to start 8pm  -f/u neurosurg starting DVT ppx --> ok for hep subq, hold ASA and plavix for 2 weeks   -cardiology consult for CHF  -LV thrombus on echo---> f/u with Dr. Dahl regarding full AC  -ECHO: 2.5 x 1.3 cm filling defect on the contrast enhanced images   consistent with LV apical thrombus, EF 20 to 25, severely decreased global left ventricular systolic function, multiple left ventricular regional wall motion abnormalities exist, mildly reduced RV systolic function, trace mitral valve regurgitation.  -Cardiology: needs full AC, will need cath when cleared by Neurosurgery  -Restart Entresto when DO clears  -PT evaluation demonstrated dizziness, patient requested to remain supine; PT recommends to d/c home with assistance when appropriate        [X] A ten-point review of systems was otherwise negative except as noted above.  [  ] Due to altered mental status/intubation, subjective information was not attained from the patient. History was obtained, to the extent possible, from review of the chart and collateral sources of information.    =========================================================================================================================================   ZUNILDA DENNIS   [id number]   [birth year]  73yM    Admit Date: 23  Indication for SDU: s/p syncopal fall with b/l Frontal SDH, SAH along falx with no midline shift.        ============================  HPI:  73M PMH of CHF (EF 30-35% in 2022) with AICD (Evera MRI XT ), CAD s/p PCI (x5 ) s/p CABG 2022, HTN, COPD (no home O2) presenting s/p syncopal fall +HT, +LOC, -AC (on ASA 81mg). Patient states that he was running late to play golf today and was running with his clubs when he syncopized and fell backward. The fall was witnessed by bystanders and states that he lost consciousness for a few minutes. No loss of fecal or urinary continence. No confusion after episode. Patient only complaining of headache and cervical spinal tenderness. Patient has voided since in ED. Patient had no chest pain, dyspnea, +dizziness (felt the room spinning). SICU consult, seen and examined. Patient denies beginning new medications, and states he ate breakfast this AM. Patient states his cardiologist started him on an "anti-dizziness medication" but cannot remember the name and was unable to locate in home medication list. Patient has discontinued DAPT per cardiologist as stents were placed in .        24 Hour Events    NIGHT  -PM rounds: ANOx3, no obvious focal neurological deficit. Patient's HA improving but still dizzy when moving around   -Phos repleted    Day  -f/u repeat CTH ---> stable   -PO amio 200 BID to start 8pm  -f/u neurosurg starting DVT ppx --> ok for hep subq, hold ASA and plavix for 2 weeks   -cardiology consult for CHF  -LV thrombus on echo---> f/u with Dr. Dahl regarding full AC  -ECHO: 2.5 x 1.3 cm filling defect on the contrast enhanced images   consistent with LV apical thrombus, EF 20 to 25, severely decreased global left ventricular systolic function, multiple left ventricular regional wall motion abnormalities exist, mildly reduced RV systolic function, trace mitral valve regurgitation.  -Cardiology: needs full AC, will need cath when cleared by Neurosurgery  -Restart Entresto when DO clears  -PT evaluation demonstrated dizziness, patient requested to remain supine; PT recommends to d/c home with assistance when appropriate        [X] A ten-point review of systems was otherwise negative except as noted above.  [  ] Due to altered mental status/intubation, subjective information was not attained from the patient. History was obtained, to the extent possible, from review of the chart and collateral sources of information.    =========================================================================================================================================    Daily       Diet, Regular:   DASH/TLC Sodium & Cholesterol Restricted (23 @ 08:51)      CURRENT MEDS:  Neurologic Medications  acetaminophen     Tablet .. 650 milliGRAM(s) Oral every 6 hours PRN Temp greater or equal to 38C (100.4F), Mild Pain (1 - 3)  levETIRAcetam 500 milliGRAM(s) Oral two times a day    Respiratory Medications  albuterol    90 MICROgram(s) HFA Inhaler 2 Puff(s) Inhalation every 6 hours PRN Shortness of Breath and/or Wheezing    Cardiovascular Medications  aMIOdarone    Tablet 200 milliGRAM(s) Oral every 12 hours  aMIOdarone IVPB 150 milliGRAM(s) IV Intermittent once  metoprolol succinate ER 50 milliGRAM(s) Oral daily  sacubitril 24 mG/valsartan 26 mG 1 Tablet(s) Oral every 12 hours    Gastrointestinal Medications  senna 2 Tablet(s) Oral at bedtime    Genitourinary Medications    Hematologic/Oncologic Medications  heparin   Injectable 5000 Unit(s) SubCutaneous every 8 hours    Endocrine/Metabolic Medications  atorvastatin 40 milliGRAM(s) Oral at bedtime    Topical/Other Medications  chlorhexidine 2% Cloths 1 Application(s) Topical <User Schedule>      ICU Vital Signs Last 24 Hrs  T(C): 36.5 (2023 07:43), Max: 36.8 (2023 23:59)  T(F): 97.7 (2023 07:43), Max: 98.2 (2023 23:59)  HR: 70 (2023 10:00) (69 - 75)  BP: 117/74 (2023 10:00) (112/63 - 143/83)  BP(mean): 91 (2023 10:00) (80 - 106)  ABP: --  ABP(mean): --  RR: 20 (2023 10:00) (11 - 29)  SpO2: 96% (2023 10:00) (95% - 99%)    O2 Parameters below as of 2023 10:00  Patient On (Oxygen Delivery Method): room air        I&O's Summary    :  -  2023 07:00  --------------------------------------------------------  IN: 1903.7 mL / OUT: 900 mL / NET: 1003.7 mL    :  -  2023 10:42  --------------------------------------------------------  IN: 140 mL / OUT: 0 mL / NET: 140 mL      I&O's Detail    2023 07:  -  2023 07:00  --------------------------------------------------------  IN:    Amiodarone: 183.7 mL    IV PiggyBack: 250 mL    sodium chloride 0.9%: 1470 mL  Total IN: 1903.7 mL    OUT:    Voided (mL): 900 mL  Total OUT: 900 mL    Total NET: 1003.7 mL      2023 07:01  -  2023 10:42  --------------------------------------------------------  IN:    sodium chloride 0.9%: 140 mL  Total IN: 140 mL    OUT:  Total OUT: 0 mL    Total NET: 140 mL          PHYSICAL EXAM:    General/Neuro: Alert & oriented x 3, no focal deficits. Equal sensation b/l.   Pupils: Equal and reactive to light  Lungs: Clear to auscultation, Normal expansion/effort. No accessory muscle use.   Cardiovascular : Non-tachycardic.  GI: Abdomen soft, Non-tender, Non-distended. Tolerating PO intake  Extremities: Extremities warm, pink, well-perfused.  Derm: Good skin turgor, no skin breakdown. No bruising   : Voiding well       LABS:                        14.2   9.85  )-----------( 129      ( 2023 21:01 )             44.6       04-25    138  |  109  |  21<H>  ----------------------------<  86  4.7   |  18  |  1.2    Ca    8.8      2023 21:01  Phos  2.7     04-25  Mg     2.0     -25    TPro  6.5  /  Alb  3.9  /  TBili  0.8  /  DBili  0.2  /  AST  17  /  ALT  10  /  AlkPhos  56  04-25      PT/INR - ( 2023 10:50 )   PT: 13.60 sec;   INR: 1.19 ratio         PTT - ( 2023 10:50 )  PTT:34.1 sec  CARDIAC MARKERS ( 2023 05:35 )  x     / x     / 136 U/L / x     / x      CARDIAC MARKERS ( 2023 02:55 )  x     / <0.01 ng/mL / 144 U/L / x     / 6.1 ng/mL  CARDIAC MARKERS ( 2023 17:18 )  x     / <0.01 ng/mL / 220 U/L / x     / 8.7 ng/mL  CARDIAC MARKERS ( 2023 10:50 )  x     / x     / 227 U/L / x     / 8.7 ng/mL      Urinalysis Basic - ( 2023 03:04 )    Color: Yellow / Appearance: Clear / S.025 / pH: x  Gluc: x / Ketone: Trace  / Bili: Negative / Urobili: <2 mg/dL   Blood: x / Protein: Trace / Nitrite: Negative   Leuk Esterase: Negative / RBC: x / WBC x   Sq Epi: x / Non Sq Epi: x / Bacteria: x

## 2023-05-04 DIAGNOSIS — I25.2 OLD MYOCARDIAL INFARCTION: ICD-10-CM

## 2023-05-04 DIAGNOSIS — I10 ESSENTIAL (PRIMARY) HYPERTENSION: ICD-10-CM
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
## 2023-05-04 DIAGNOSIS — D32.0 BENIGN NEOPLASM OF CEREBRAL MENINGES: ICD-10-CM

## 2023-05-04 DIAGNOSIS — S06.6X1A TRAUMATIC SUBARACHNOID HEMORRHAGE WITH LOSS OF CONSCIOUSNESS OF 30 MINUTES OR LESS, INITIAL ENCOUNTER: ICD-10-CM

## 2023-05-04 DIAGNOSIS — J44.9 CHRONIC OBSTRUCTIVE PULMONARY DISEASE, UNSPECIFIED: ICD-10-CM

## 2023-05-04 DIAGNOSIS — I51.3 INTRACARDIAC THROMBOSIS, NOT ELSEWHERE CLASSIFIED: ICD-10-CM

## 2023-05-04 DIAGNOSIS — G93.6 CEREBRAL EDEMA: ICD-10-CM

## 2023-05-04 DIAGNOSIS — N17.9 ACUTE KIDNEY FAILURE, UNSPECIFIED: ICD-10-CM

## 2023-05-04 DIAGNOSIS — Z79.82 LONG TERM (CURRENT) USE OF ASPIRIN: ICD-10-CM

## 2023-05-04 DIAGNOSIS — I25.5 ISCHEMIC CARDIOMYOPATHY: ICD-10-CM

## 2023-05-04 DIAGNOSIS — Z95.5 PRESENCE OF CORONARY ANGIOPLASTY IMPLANT AND GRAFT: ICD-10-CM

## 2023-05-04 DIAGNOSIS — I25.10 ATHEROSCLEROTIC HEART DISEASE OF NATIVE CORONARY ARTERY WITHOUT ANGINA PECTORIS: ICD-10-CM

## 2023-05-04 DIAGNOSIS — Z79.02 LONG TERM (CURRENT) USE OF ANTITHROMBOTICS/ANTIPLATELETS: ICD-10-CM

## 2023-05-04 DIAGNOSIS — I47.1 SUPRAVENTRICULAR TACHYCARDIA: ICD-10-CM

## 2023-05-04 DIAGNOSIS — Z95.810 PRESENCE OF AUTOMATIC (IMPLANTABLE) CARDIAC DEFIBRILLATOR: ICD-10-CM

## 2023-05-04 DIAGNOSIS — S06.5X1A TRAUMATIC SUBDURAL HEMORRHAGE WITH LOSS OF CONSCIOUSNESS OF 30 MINUTES OR LESS, INITIAL ENCOUNTER: ICD-10-CM

## 2023-05-06 RX ORDER — AMIODARONE HYDROCHLORIDE 400 MG/1
1 TABLET ORAL
Qty: 30 | Refills: 0
Start: 2023-05-06

## 2023-05-08 ENCOUNTER — APPOINTMENT (OUTPATIENT)
Dept: CARDIOLOGY | Facility: CLINIC | Age: 73
End: 2023-05-08
Payer: MEDICARE

## 2023-05-08 VITALS — HEART RATE: 60 BPM | SYSTOLIC BLOOD PRESSURE: 121 MMHG | DIASTOLIC BLOOD PRESSURE: 80 MMHG

## 2023-05-08 VITALS — BODY MASS INDEX: 24.22 KG/M2 | WEIGHT: 173 LBS | HEIGHT: 71 IN

## 2023-05-08 PROCEDURE — 99214 OFFICE O/P EST MOD 30 MIN: CPT

## 2023-05-08 RX ORDER — AMLODIPINE BESYLATE 5 MG/1
5 TABLET ORAL DAILY
Qty: 30 | Refills: 3 | Status: DISCONTINUED | COMMUNITY
Start: 2022-10-07 | End: 2023-05-08

## 2023-05-17 ENCOUNTER — APPOINTMENT (OUTPATIENT)
Age: 73
End: 2023-05-17
Payer: MEDICARE

## 2023-05-17 VITALS — HEIGHT: 71 IN | BODY MASS INDEX: 24.22 KG/M2 | WEIGHT: 173 LBS

## 2023-05-17 DIAGNOSIS — Z82.49 FAMILY HISTORY OF ISCHEMIC HEART DISEASE AND OTHER DISEASES OF THE CIRCULATORY SYSTEM: ICD-10-CM

## 2023-05-17 DIAGNOSIS — Z86.79 PERSONAL HISTORY OF OTHER DISEASES OF THE CIRCULATORY SYSTEM: ICD-10-CM

## 2023-05-17 DIAGNOSIS — Z86.39 PERSONAL HISTORY OF OTHER ENDOCRINE, NUTRITIONAL AND METABOLIC DISEASE: ICD-10-CM

## 2023-05-17 DIAGNOSIS — Z87.438 PERSONAL HISTORY OF OTHER DISEASES OF MALE GENITAL ORGANS: ICD-10-CM

## 2023-05-17 PROCEDURE — 99213 OFFICE O/P EST LOW 20 MIN: CPT

## 2023-05-17 NOTE — HISTORY OF PRESENT ILLNESS
[FreeTextEntry1] : Mr. Jeronimo, PMH of CHF with AICD, CAD s/p PCI, HTN, COPD  is here as a hospital follow up, he is s/p syncopal fall on 4/24/23 after going into Vfib. He was found on CTH SDH/SAH. \par \par Today patient reports headaches and dizziness are improving, though he get dizzy when he sit up from a laying position. He reports of balance issues, but does not want to use the walker or cane. Also reports of progressive hearing loss since his fall.\par Placed on eliquis by his cardiologist.\par \par \par \par

## 2023-05-22 ENCOUNTER — APPOINTMENT (OUTPATIENT)
Dept: OTOLARYNGOLOGY | Facility: CLINIC | Age: 73
End: 2023-05-22
Payer: MEDICARE

## 2023-05-22 VITALS — WEIGHT: 173 LBS | BODY MASS INDEX: 24.22 KG/M2 | HEIGHT: 71 IN

## 2023-05-22 DIAGNOSIS — Z90.49 ACQUIRED ABSENCE OF OTHER SPECIFIED PARTS OF DIGESTIVE TRACT: ICD-10-CM

## 2023-05-22 PROCEDURE — 99204 OFFICE O/P NEW MOD 45 MIN: CPT | Mod: 25

## 2023-05-22 PROCEDURE — 31575 DIAGNOSTIC LARYNGOSCOPY: CPT

## 2023-05-22 NOTE — ASSESSMENT
[FreeTextEntry1] : History and discussion with patient's daughter.\par \par Recommended a hearing test, patient will do it next visit.

## 2023-05-22 NOTE — HISTORY OF PRESENT ILLNESS
[FreeTextEntry1] : Patient presents today c/o thyroid nodules .  Patient states a few years ago he was told he had a nodule on this thyroid.  He admits to not remembering where and when testing was done.  He denies any neck pain

## 2023-05-25 ENCOUNTER — APPOINTMENT (OUTPATIENT)
Dept: ELECTROPHYSIOLOGY | Facility: CLINIC | Age: 73
End: 2023-05-25
Payer: MEDICARE

## 2023-05-25 VITALS
SYSTOLIC BLOOD PRESSURE: 101 MMHG | HEIGHT: 71 IN | TEMPERATURE: 98 F | BODY MASS INDEX: 24.22 KG/M2 | WEIGHT: 173 LBS | DIASTOLIC BLOOD PRESSURE: 67 MMHG | HEART RATE: 76 BPM

## 2023-05-25 PROCEDURE — 93000 ELECTROCARDIOGRAM COMPLETE: CPT | Mod: 59

## 2023-05-25 PROCEDURE — 93283 PRGRMG EVAL IMPLANTABLE DFB: CPT

## 2023-05-25 PROCEDURE — 93290 INTERROG DEV EVAL ICPMS IP: CPT | Mod: 26

## 2023-05-25 PROCEDURE — 99215 OFFICE O/P EST HI 40 MIN: CPT | Mod: 25

## 2023-05-25 RX ORDER — FUROSEMIDE 80 MG/1
TABLET ORAL
Refills: 0 | Status: COMPLETED | COMMUNITY
End: 2023-05-25

## 2023-05-25 NOTE — PATIENT PROFILE ADULT - COMPLETE THE FOLLOWING IF THE PATIENT REFUSES THE INFLUENZA VACCINE:
Impression: S/P Cataract Extraction by phacoemulsification with IOL placement OS - 28 Days. Encounter for surgical aftercare following surgery on a sense organ  Z48.810. Plan: Pt satisfied with visual improvements, rel SRx per pt request; appreciated demo of near rx in phoropter. BCVA 20/40- likely limited 2' to h/o [address] and constricted VF.  OCT mac confirms no CME today
Risks/benefits discussed with patient/surrogate

## 2023-05-26 ENCOUNTER — APPOINTMENT (OUTPATIENT)
Dept: CARDIOLOGY | Facility: CLINIC | Age: 73
End: 2023-05-26

## 2023-05-31 NOTE — PROCEDURE
[No] : not [NSR] : normal sinus rhythm [Voltage: ___ volts] : Voltage was [unfilled] volts [Charge Time: ___ sec] : charge time was [unfilled] seconds [Longevity: ___ months] : The estimated remaining battery life is [unfilled] months [Threshold Testing Performed] : Threshold testing was performed [Atrial] : Atrial [Ventricular] : Ventricular [Lead Imp:  ___ohms] : lead impedance was [unfilled] ohms [Sensing Amplitude ___mv] : sensing amplitude was [unfilled] mv [___V @] : [unfilled] V [___ ms] : [unfilled] ms [None] : none [Programmed for Longevity] : output reprogrammed for improved battery longevity [Asense-Vsense ___ %] : Asense-Vsense [unfilled]% [Asense-Vpace ___ %] : Asense-Vpace [unfilled]% [Apace-Vsense ___ %] : Apace-Vsense [unfilled]% [Apace-Vpace ___ %] : Apace-Vpace [unfilled]% [de-identified] : 60 BPM [de-identified] : [place] [de-identified] : Reese MRI XT  [id number] [de-identified] : [id number] [de-identified] : 12/21/2021 [de-identified] : COURTNEY [de-identified] :  [de-identified] : No events\par Optivol below threshold\par Transmitting on [facility]

## 2023-05-31 NOTE — ADDENDUM
[FreeTextEntry1] : IChika assisted in documentation on 05/29/2023 acting as a scribe for Dr. Calvin Laguerre.\par

## 2023-05-31 NOTE — ASSESSMENT
[FreeTextEntry1] : ## Ischemic cardiomyopathy \par # VT s/p DC-ICD (MDT, 21, non-dep)\par ## Cardiotoxic Drug Monitoring \par \par - ICD interrogation shows normally functioning DC-ICD. Battery life ok. Optivol below threshold. No new events. VT episode s/p ICD shock. Discussed management options including antiarrhythmic versus ablation. For now, continue with anticoagulation and antiarrhythmic. \par - If LV thrombus dissolves, will consider VT ablation. \par - We discussed multiple therapeutic options for the treatment of ventricular tachycardia/premature ventricular contraction, including undergoing an ablation. The details of the procedure and risks associated with undergoing an ablation were discussed in detail including, but not limited to, death, myocardial ischemia, stroke, cardiac perforation, need for emergent cardiac or thoracic surgery, coronary artery injury, phrenic nerve injury, catheter entrapment in the mitral valve or other location, bleeding, infection, deep vein thrombosis, vascular injury, and worsening ventricular arrhythmias.\par - Will hold off on VT ablation until LV thrombus is dissolved. \par - Echo in 6-8 weeks. \par - TSH/LFT q6 months.\par - GDMT as per cardiology. \par - Remote monitoring transfer. \par - Return in 6 months.

## 2023-05-31 NOTE — HISTORY OF PRESENT ILLNESS
[de-identified] : Mr. Jeronimo is a 72 year-old male with hx of CAD,  PCI, MI s/p CABG, ischemic cardiomyopathy s/p ICD, HTN, dyslipidemia, COPD, VT s/p ICD shock, is here to establish care. \par \par Was followed by Dr. Padron\par \par 05/25/2023: Feels fine. Had a sustained monomorphic VT, needed ICD shock. Was admitted at [facility]. Found to have LV thrombus. Started on anticoagulation.\par \par Accompanied by son. \par \par Denies chest pain, shortness of breath, palpitation, dizziness or LOC except noted above.\par \par EKG (05/25/2023): SR \par EKG (03/02/2023): SR \par Cardio: Dr. Elizalde

## 2023-06-01 NOTE — HISTORY OF PRESENT ILLNESS
[FreeTextEntry1] : PT WITH CAD S/P STENTS X3 10/2006, 2 MORE 12/2006, CABG X 3 [facility] 12/21 AFTER AICD D/C, ICM WITH LVEF 25%. htn, emphysema, old MI, family h/o  POLYCYTHEMIA VERA  SDH. LV APICAL THROMBUS.\par \par PMD: GITTERMAN \par EP:  HASSAN \par \par \par PT GET phlebotomy  EVERY 2 WEEKS ? POLYCYTHEMIA VERA \par \par 2/7/23: pt here for f/u, pt was 100/70 to 90/70 standing up and says bp at home was 170/70. pt with no dizziness on standing, pt had phlebotomy 1 month ago.pt says stands in yard and stretches at time. \par 11/22: carotid: b/l plaque, less than 50% b/l ICA. \par 11/22: NO evidence AAA, mild atherosclerosis DA\par Pt says sob only with heavy exertion, pt has not had AICD check in over year. \par ntbnp: 335 HDL 39 \par LDL 91 \par \par \par 5/8/23:\par Patient staes he had a seizure about two ago, pt had seizure and was in hospital after AICD firing. pt had to see neurosurgery and had TRAUMATIC SDH. \par  1. Left ventricular ejection fraction, by visual estimation, is 20 to 25%.\par  2. Severely decreased global left ventricular systolic function.\par  3. Multiple left ventricular regional wall motion abnormalities exist. See wall motion findings.\par  4. Endocardial visualization was enhanced with intravenous echo contrast.\par  5. 2.5 x 1.3 cm filling defect on the contrast enhanced images consistent with LV apical thrombus.\par  6. Mildly enlarged right ventricle.\par  7. Mildly reduced RV systolic function.\par  8. Trace mitral valve regurgitation.\par  9. Normal left atrial size.\par 10. Mildly enlarged right atrium.\par PT NOT FEELING WELL. \par NEUROSX: si  [de-identified] : Mr. Jeronimo is a 72 year-old male with hx of CAD,  PCI, MI s/p CABG, ischemic cardiomyopathy s/p ICD, HTN, dyslipidemia, COPD, VT s/p ICD shock, is here to establish care. \par \par Was followed by Dr. Padron\par \par Denies chest pain, shortness of breath, palpitation, dizziness or LOC except noted above.\par \par EKG (03/02/2023): SR \par Cardio: Dr. Elizalde

## 2023-06-01 NOTE — DISCUSSION/SUMMARY
[FreeTextEntry1] : PT NYHA CLASS 2 \par \par 2/7/23: stop plavix, stop met tartrate, start succinate 50 \par start ezetimbe to get ldl to 55 bloodwork \par get echo use lasix 40 as needed for sob. \par \par 5/8/23: PT WITH TRAUMATIC BRAIN BLEED \par d/w patient will start elqiuis on patient as large lv apical thrombus, sdm with daughter and wife. \par will repeat 2 d echo with contrast for LV thrombus \par hold aspirin and start in one month \par start eliquis now. gfr 64 \par

## 2023-06-01 NOTE — CARDIOLOGY SUMMARY
[de-identified] : 11/22: carotid: b/l plaque, less than 50% b/l ICA. \par 11/22: NO evidence AAA, mild atherosclerosis DA

## 2023-06-01 NOTE — PROCEDURE
[No] : not [NSR] : normal sinus rhythm [ICD] : Implantable cardioverter-defibrillator [Longevity: ___ months] : The estimated remaining battery life is [unfilled] months [Lead Imp:  ___ohms] : lead impedance was [unfilled] ohms [Sensing Amplitude ___mv] : sensing amplitude was [unfilled] mv [___V @] : [unfilled] V [___ ms] : [unfilled] ms [None] : none [Asense-Vsense ___ %] : Asense-Vsense [unfilled]% [Asense-Vpace ___ %] : Asense-Vpace [unfilled]% [Apace-Vsense ___ %] : Apace-Vsense [unfilled]% [Apace-Vpace ___ %] : Apace-Vpace [unfilled]% [de-identified] : Reese MRI XT  [de-identified] : [id number] [de-identified] : [id number] [de-identified] : 12/21/21 [de-identified] : AAIR/DDDR [de-identified] :  [de-identified] : Many NSVT episodes, all appear to be SVT. Most recent on 2/28/23 for 4 seconds.

## 2023-06-01 NOTE — PHYSICAL EXAM
[Normal S1, S2] : normal S1, S2 [General Appearance - Well Developed] : well developed [Normal Appearance] : normal appearance [Well Groomed] : well groomed [General Appearance - Well Nourished] : well nourished [No Deformities] : no deformities [General Appearance - In No Acute Distress] : no acute distress [Heart Rate And Rhythm] : heart rate and rhythm were normal [Heart Sounds] : normal S1 and S2 [Murmurs] : no murmurs present [Clean] : clean [Dry] : dry [Well-Healed] : well-healed [Wheeze ____] : wheeze [unfilled] [de-identified] : RRR

## 2023-06-09 ENCOUNTER — APPOINTMENT (OUTPATIENT)
Dept: CARDIOLOGY | Facility: CLINIC | Age: 73
End: 2023-06-09
Payer: MEDICARE

## 2023-06-09 VITALS — DIASTOLIC BLOOD PRESSURE: 80 MMHG | SYSTOLIC BLOOD PRESSURE: 120 MMHG | HEART RATE: 70 BPM

## 2023-06-09 VITALS — BODY MASS INDEX: 23.8 KG/M2 | WEIGHT: 170 LBS | HEIGHT: 71 IN

## 2023-06-09 PROCEDURE — 93306 TTE W/DOPPLER COMPLETE: CPT

## 2023-06-09 PROCEDURE — 99215 OFFICE O/P EST HI 40 MIN: CPT

## 2023-06-09 NOTE — CARDIOLOGY SUMMARY
[de-identified] : 11/22: carotid: b/l plaque, less than 50% b/l ICA. \par 11/22: NO evidence AAA, mild atherosclerosis DA

## 2023-06-09 NOTE — HISTORY OF PRESENT ILLNESS
[FreeTextEntry1] : PT WITH CAD S/P STENTS X3 10/2006, 2 MORE 12/2006, CABG X 3 [facility] 12/21 AFTER AICD D/C, ICM WITH LVEF 25%. htn, emphysema, old MI, family h/o  POLYCYTHEMIA VERA  SDH. LV APICAL THROMBUS 2.5 x 1.3 cm in apex\par \par PMD: GITTERMAN \par EP:  SONYA \par \par \par PT GET phlebotomy  EVERY 2 WEEKS ? POLYCYTHEMIA VERA \par \par 2/7/23: pt here for f/u, pt was 100/70 to 90/70 standing up and says bp at home was 170/70. pt with no dizziness on standing, pt had phlebotomy 1 month ago.pt says stands in yard and stretches at time. \par 11/22: carotid: b/l plaque, less than 50% b/l ICA. \par 11/22: NO evidence AAA, mild atherosclerosis DA\par Pt says sob only with heavy exertion, pt has not had AICD check in over year. \par ntbnp: 335 HDL 39 \par LDL 91 \par \par \par 5/8/23:\par Patient states he had a seizure about two ago, pt had seizure and was in hospital after AICD firing. pt had to see neurosurgery and had TRAUMATIC SDH. \par  1. Left ventricular ejection fraction, by visual estimation, is 20 to 25%.\par  2. Severely decreased global left ventricular systolic function.\par  3. Multiple left ventricular regional wall motion abnormalities exist. See wall motion findings.\par  4. Endocardial visualization was enhanced with intravenous echo contrast.\par  5. 2.5 x 1.3 cm filling defect on the contrast enhanced images consistent with LV apical thrombus.\par  6. Mildly enlarged right ventricle.\par  7. Mildly reduced RV systolic function.\par  8. Trace mitral valve regurgitation.\par  9. Normal left atrial size.\par 10. Mildly enlarged right atrium.\par PT NOT FEELING WELL. \par NEUROSX: siuh \par \par 6/9/23:\par echo before appointment \par pt was in hospital and had ? apical lv thrombus and SDH after VT requiring shock, pt may need vt ablation after clot resolves. pt is on elqiuis and amio 200 qd DR. HASSAN IS EPS. \par \par ECHO TODAY WITH LVEF improved 25% to 35% With contrast shows well organized thrombus in apex and improved vs prior. pt may need procedure, pt complains of lots of dizziness. \par spent over 60 minutes with patient and reviewed all echos with patient. \par pt say dizziness room spinnning. \par \par  [de-identified] : \par

## 2023-06-09 NOTE — PHYSICAL EXAM
[Normal S1, S2] : normal S1, S2 [Wheeze ____] : wheeze [unfilled] [General Appearance - Well Developed] : well developed [Normal Appearance] : normal appearance [Well Groomed] : well groomed [General Appearance - Well Nourished] : well nourished [No Deformities] : no deformities [General Appearance - In No Acute Distress] : no acute distress [Heart Rate And Rhythm] : heart rate and rhythm were normal [Heart Sounds] : normal S1 and S2 [Murmurs] : no murmurs present [Clean] : clean [Dry] : dry [Well-Healed] : well-healed [de-identified] : RRR

## 2023-06-09 NOTE — PROCEDURE
[No] : not [NSR] : normal sinus rhythm [ICD] : Implantable cardioverter-defibrillator [Longevity: ___ months] : The estimated remaining battery life is [unfilled] months [Lead Imp:  ___ohms] : lead impedance was [unfilled] ohms [Sensing Amplitude ___mv] : sensing amplitude was [unfilled] mv [___V @] : [unfilled] V [___ ms] : [unfilled] ms [None] : none [Asense-Vsense ___ %] : Asense-Vsense [unfilled]% [Asense-Vpace ___ %] : Asense-Vpace [unfilled]% [Apace-Vsense ___ %] : Apace-Vsense [unfilled]% [Apace-Vpace ___ %] : Apace-Vpace [unfilled]% [de-identified] : Reese MRI XT  [de-identified] : [id number] [de-identified] : [id number] [de-identified] : 12/21/21 [de-identified] : AAIR/DDDR [de-identified] :  [de-identified] : Many NSVT episodes, all appear to be SVT. Most recent on 2/28/23 for 4 seconds.

## 2023-06-09 NOTE — DISCUSSION/SUMMARY
[FreeTextEntry1] : PT NYHA CLASS 2 \par \par 2/7/23: stop plavix, stop met tartrate, start succinate 50 \par start ezetimbe to get ldl to 55 bloodwork \par get echo use lasix 40 as needed for sob. \par \par 5/8/23: PT WITH TRAUMATIC BRAIN BLEED \par d/w patient will start elqiuis on patient as large lv apical thrombus, sdm with daughter and wife. \par will repeat 2 d echo with contrast for LV thrombus \par hold aspirin and start in one month \par start eliquis now. gfr 64 \par \par 6/9/23: pt to resume amiodarone\par start meclizine for vertigo\par give compression stockings \par continue eliquis \par repeat echo in 2 months \par no aneurysmectomy for now \par but will consider sending.

## 2023-06-12 NOTE — CARDIOLOGY SUMMARY
[de-identified] : 11/22: carotid: b/l plaque, less than 50% b/l ICA. \par 11/22: NO evidence AAA, mild atherosclerosis DA

## 2023-06-12 NOTE — PHYSICAL EXAM
[Normal S1, S2] : normal S1, S2 [Wheeze ____] : wheeze [unfilled] [General Appearance - Well Developed] : well developed [Normal Appearance] : normal appearance [Well Groomed] : well groomed [General Appearance - Well Nourished] : well nourished [No Deformities] : no deformities [General Appearance - In No Acute Distress] : no acute distress [Heart Rate And Rhythm] : heart rate and rhythm were normal [Heart Sounds] : normal S1 and S2 [Murmurs] : no murmurs present [Clean] : clean [Dry] : dry [Well-Healed] : well-healed [de-identified] : RRR

## 2023-06-12 NOTE — PROCEDURE
[No] : not [NSR] : normal sinus rhythm [ICD] : Implantable cardioverter-defibrillator [Longevity: ___ months] : The estimated remaining battery life is [unfilled] months [Lead Imp:  ___ohms] : lead impedance was [unfilled] ohms [Sensing Amplitude ___mv] : sensing amplitude was [unfilled] mv [___V @] : [unfilled] V [___ ms] : [unfilled] ms [None] : none [Asense-Vsense ___ %] : Asense-Vsense [unfilled]% [Asense-Vpace ___ %] : Asense-Vpace [unfilled]% [Apace-Vsense ___ %] : Apace-Vsense [unfilled]% [Apace-Vpace ___ %] : Apace-Vpace [unfilled]% [de-identified] : Reese MRI XT  [de-identified] : [id number] [de-identified] : [id number] [de-identified] : AAIR/DDDR [de-identified] : 12/21/21 [de-identified] :  [de-identified] : Many NSVT episodes, all appear to be SVT. Most recent on 2/28/23 for 4 seconds.

## 2023-06-12 NOTE — HISTORY OF PRESENT ILLNESS
[FreeTextEntry1] : PT WITH CAD S/P STENTS X3 10/2006, 2 MORE 12/2006, CABG X 3 [facility] 12/21 AFTER AICD D/C, ICM WITH LVEF 25%. htn, emphysema, old MI, family h/o  POLYCYTHEMIA VERA  SDH. LV APICAL THROMBUS.\par \par PMD: GITTERMAN \par EP:  SONYA \par \par \par PT GET phlebotomy  EVERY 2 WEEKS ? POLYCYTHEMIA VERA \par \par 2/7/23: pt here for f/u, pt was 100/70 to 90/70 standing up and says bp at home was 170/70. pt with no dizziness on standing, pt had phlebotomy 1 month ago.pt says stands in yard and stretches at time. \par 11/22: carotid: b/l plaque, less than 50% b/l ICA. \par 11/22: NO evidence AAA, mild atherosclerosis DA\par Pt says sob only with heavy exertion, pt has not had AICD check in over year. \par ntbnp: 335 HDL 39 \par LDL 91 \par \par \par 5/8/23:\par Patient states he had a seizure about two ago, pt had seizure and was in hospital after AICD firing. pt had to see neurosurgery and had TRAUMATIC SDH. \par  1. Left ventricular ejection fraction, by visual estimation, is 20 to 25%.\par  2. Severely decreased global left ventricular systolic function.\par  3. Multiple left ventricular regional wall motion abnormalities exist. See wall motion findings.\par  4. Endocardial visualization was enhanced with intravenous echo contrast.\par  5. 2.5 x 1.3 cm filling defect on the contrast enhanced images consistent with LV apical thrombus.\par  6. Mildly enlarged right ventricle.\par  7. Mildly reduced RV systolic function.\par  8. Trace mitral valve regurgitation.\par  9. Normal left atrial size.\par 10. Mildly enlarged right atrium.\par PT NOT FEELING WELL. \par NEUROSX: siuh \par \par 6/9/23:\par echo before appointment \par pt was in hospital and had ? apical lv thrombus and SDH after VT requiring shock, pt may need vt ablation after clot resolves. pt is on elqiuis and amio 200 qd DR. HASSAN IS EPS. \par  [de-identified] : \par

## 2023-06-19 ENCOUNTER — RX RENEWAL (OUTPATIENT)
Age: 73
End: 2023-06-19

## 2023-06-20 ENCOUNTER — OUTPATIENT (OUTPATIENT)
Dept: OUTPATIENT SERVICES | Facility: HOSPITAL | Age: 73
LOS: 1 days | End: 2023-06-20
Payer: MEDICARE

## 2023-06-20 DIAGNOSIS — Z00.8 ENCOUNTER FOR OTHER GENERAL EXAMINATION: ICD-10-CM

## 2023-06-20 DIAGNOSIS — Z95.5 PRESENCE OF CORONARY ANGIOPLASTY IMPLANT AND GRAFT: Chronic | ICD-10-CM

## 2023-06-20 DIAGNOSIS — I25.10 ATHEROSCLEROTIC HEART DISEASE OF NATIVE CORONARY ARTERY WITHOUT ANGINA PECTORIS: Chronic | ICD-10-CM

## 2023-06-20 DIAGNOSIS — R51.9 HEADACHE, UNSPECIFIED: ICD-10-CM

## 2023-06-20 PROCEDURE — 70450 CT HEAD/BRAIN W/O DYE: CPT | Mod: 26

## 2023-06-20 PROCEDURE — 70450 CT HEAD/BRAIN W/O DYE: CPT

## 2023-06-21 ENCOUNTER — NON-APPOINTMENT (OUTPATIENT)
Age: 73
End: 2023-06-21

## 2023-06-21 DIAGNOSIS — R51.9 HEADACHE, UNSPECIFIED: ICD-10-CM

## 2023-06-21 DIAGNOSIS — G93.9 DISORDER OF BRAIN, UNSPECIFIED: ICD-10-CM

## 2023-06-29 ENCOUNTER — APPOINTMENT (OUTPATIENT)
Dept: ELECTROPHYSIOLOGY | Facility: CLINIC | Age: 73
End: 2023-06-29
Payer: MEDICARE

## 2023-06-29 VITALS
HEART RATE: 78 BPM | TEMPERATURE: 98 F | DIASTOLIC BLOOD PRESSURE: 70 MMHG | WEIGHT: 170 LBS | SYSTOLIC BLOOD PRESSURE: 104 MMHG | BODY MASS INDEX: 23.8 KG/M2 | HEIGHT: 71 IN

## 2023-06-29 PROCEDURE — 99215 OFFICE O/P EST HI 40 MIN: CPT

## 2023-06-29 PROCEDURE — 93283 PRGRMG EVAL IMPLANTABLE DFB: CPT

## 2023-06-29 PROCEDURE — 93000 ELECTROCARDIOGRAM COMPLETE: CPT | Mod: 59

## 2023-06-29 PROCEDURE — 93290 INTERROG DEV EVAL ICPMS IP: CPT | Mod: 26

## 2023-06-29 RX ORDER — MECLIZINE HCL 25 MG/1
25 TABLET ORAL 3 TIMES DAILY
Qty: 90 | Refills: 0 | Status: ACTIVE | COMMUNITY
Start: 2023-06-09

## 2023-06-29 RX ORDER — EZETIMIBE 10 MG/1
10 TABLET ORAL
Qty: 90 | Refills: 3 | Status: ACTIVE | COMMUNITY
Start: 2023-02-07

## 2023-06-29 RX ORDER — TAMSULOSIN HYDROCHLORIDE 0.4 MG/1
0.4 CAPSULE ORAL
Qty: 90 | Refills: 3 | Status: ACTIVE | COMMUNITY

## 2023-06-29 RX ORDER — SACUBITRIL AND VALSARTAN 24; 26 MG/1; MG/1
24-26 TABLET, FILM COATED ORAL
Qty: 90 | Refills: 3 | Status: ACTIVE | COMMUNITY

## 2023-06-29 RX ORDER — ATORVASTATIN CALCIUM 40 MG/1
40 TABLET, FILM COATED ORAL
Qty: 90 | Refills: 3 | Status: ACTIVE | COMMUNITY
Start: 2018-03-06

## 2023-06-29 RX ORDER — FUROSEMIDE 40 MG/1
40 TABLET ORAL DAILY
Qty: 30 | Refills: 5 | Status: COMPLETED | COMMUNITY
Start: 2022-10-07 | End: 2023-06-29

## 2023-06-30 LAB
ALBUMIN SERPL ELPH-MCNC: 4.4 G/DL
ALP BLD-CCNC: 80 U/L
ALT SERPL-CCNC: 18 U/L
ANION GAP SERPL CALC-SCNC: 12 MMOL/L
AST SERPL-CCNC: 16 U/L
BILIRUB SERPL-MCNC: 0.4 MG/DL
BUN SERPL-MCNC: 17 MG/DL
CALCIUM SERPL-MCNC: 10 MG/DL
CHLORIDE SERPL-SCNC: 105 MMOL/L
CO2 SERPL-SCNC: 24 MMOL/L
CREAT SERPL-MCNC: 1.5 MG/DL
EGFR: 49 ML/MIN/1.73M2
GLUCOSE SERPL-MCNC: 100 MG/DL
MAGNESIUM SERPL-MCNC: 2 MG/DL
POTASSIUM SERPL-SCNC: 4.9 MMOL/L
PROT SERPL-MCNC: 7.5 G/DL
SODIUM SERPL-SCNC: 141 MMOL/L
TSH SERPL-ACNC: 3.48 UIU/ML

## 2023-07-03 NOTE — ADDENDUM
[FreeTextEntry1] : Chika BOUDREAUX assisted in documentation on 06/30/2023 acting as a scribe for Dr. Calvin Laguerre.\par

## 2023-07-03 NOTE — DIETITIAN INITIAL EVALUATION ADULT - WEIGHT FOR BMI (KG)
07/03/2023      Ke Oliver  [address] [address]          [facility] Medicine Dept.  [facility] [address] [place] [id number] [phone] after hours  [phone] fax Ke Oliver has been hospitalized at the [facility] since 7/1/2023.  Please excuse the patient and family from duties.  Patient may return on 7/5/23.  No restrictions.     Please contact me if you have any questions.                  __________________________  Sita Dominguez PA-C  07/03/2023          77.1

## 2023-07-03 NOTE — PROCEDURE
[No] : not [NSR] : normal sinus rhythm [ICD] : Implantable cardioverter-defibrillator [Voltage: ___ volts] : Voltage was [unfilled] volts [Charge Time: ___ sec] : charge time was [unfilled] seconds [Longevity: ___ months] : The estimated remaining battery life is [unfilled] months [Threshold Testing Performed] : Threshold testing was performed [Atrial] : Atrial [Ventricular] : Ventricular [Lead Imp:  ___ohms] : lead impedance was [unfilled] ohms [Sensing Amplitude ___mv] : sensing amplitude was [unfilled] mv [___V @] : [unfilled] V [___ ms] : [unfilled] ms [None] : none [Programmed for Longevity] : output reprogrammed for improved battery longevity [Asense-Vsense ___ %] : Asense-Vsense [unfilled]% [Asense-Vpace ___ %] : Asense-Vpace [unfilled]% [Apace-Vsense ___ %] : Apace-Vsense [unfilled]% [Apace-Vpace ___ %] : Apace-Vpace [unfilled]% [de-identified] : 60 BPM [de-identified] : [place] [de-identified] : Reese MRI XT  [id number] [de-identified] : [id number] [de-identified] : 12/21/2021 [de-identified] : COURTNEY [de-identified] :  [de-identified] : No events\par [place] ABOVE threshold\par ALL THERAPIES ARE PROGRAMMED TO B<AX\par Transmitting on [facility]\par AT/AF [place] 0.0%

## 2023-07-03 NOTE — HISTORY OF PRESENT ILLNESS
[de-identified] : Mr. Jeronimo is a 72 year-old male with hx of CAD,  PCI, MI s/p CABG, ischemic cardiomyopathy s/p ICD, HTN, dyslipidemia, COPD, VT s/p ICD shock, is here to establish care. \par \par Was followed by Dr. Padron\par \par 05/25/2023: Feels fine. Had a sustained monomorphic VT, needed ICD shock. Was admitted at [facility]. Found to have LV thrombus. Started on anticoagulation.\par 06/29/2023: He is here for echo report and to discuss further management of VT. Accompanied by his wife. Feels dizzy at times. \par \par Accompanied by son. \par \par Denies chest pain, shortness of breath, palpitation, dizziness or LOC except noted above.\par \par EKG (06/29/2023): SR @ 78, , QRS 86, QTc 406\par EKG (05/25/2023): SR \par EKG (03/02/2023): SR \par Cardio: Dr. Elizalde

## 2023-07-03 NOTE — ASSESSMENT
[FreeTextEntry1] : ## Ischemic cardiomyopathy \par # VT s/p DC-ICD (MDT, 21, non-dep)\par ## Cardiotoxic Drug Monitoring \par \par - ICD interrogation shows normally functioning DC-ICD. Battery life ok. Optivol below threshold. No new events. \par  - Echo showed organized LV thrombus. Considering one episode of VT, doing fine on Amiodarone, no further episodes of VT, and organized LV thrombus, we will continue with medical management for now. Continue with Amiodarone 200 mg for now. \par - Will reduce Amiodarone to 100 mg at next visit if there are no further VT episodes.\par - Dizziness is likely due to hypotension versus external ear problem. External ear treatment as per PCP. Will DC Amlodipine. If blood pressure is better, can increase his Entresto. \par - TSH/LFT q6 months.\par - GDMT as per cardiology. \par - Remote monitoring.\par - Return in 6 months.

## 2023-07-07 ENCOUNTER — APPOINTMENT (OUTPATIENT)
Dept: OTOLARYNGOLOGY | Facility: CLINIC | Age: 73
End: 2023-07-07

## 2023-07-07 ENCOUNTER — APPOINTMENT (OUTPATIENT)
Dept: OTOLARYNGOLOGY | Facility: CLINIC | Age: 73
End: 2023-07-07
Payer: MEDICARE

## 2023-07-07 DIAGNOSIS — H93.8X3 OTHER SPECIFIED DISORDERS OF EAR, BILATERAL: ICD-10-CM

## 2023-07-07 PROCEDURE — 99213 OFFICE O/P EST LOW 20 MIN: CPT

## 2023-07-07 NOTE — REASON FOR VISIT
[Subsequent Evaluation] : a subsequent evaluation for [FreeTextEntry2] : SNHL, head trauma sequela, history of parotidectomy

## 2023-07-07 NOTE — ASSESSMENT
[FreeTextEntry1] : Patient still complaining of fogginess of hearing on both sides since his head trauma. \par \par He could not wait for the hearing test, to be scheduled then f/u. \par \par Part of history and discussion with patient's wife.

## 2023-07-07 NOTE — HISTORY OF PRESENT ILLNESS
[FreeTextEntry1] : Patient returns today c/o SNHL, head trauma sequela, history of parotidectomy. States that he is doing well. Another physician told patient he has fluid in his ears. Having head pain from head trauma. Did not have U/S performed.

## 2023-07-11 ENCOUNTER — OUTPATIENT (OUTPATIENT)
Dept: OUTPATIENT SERVICES | Facility: HOSPITAL | Age: 73
LOS: 1 days | End: 2023-07-11
Payer: MEDICARE

## 2023-07-11 DIAGNOSIS — G93.9 DISORDER OF BRAIN, UNSPECIFIED: ICD-10-CM

## 2023-07-11 DIAGNOSIS — Z00.8 ENCOUNTER FOR OTHER GENERAL EXAMINATION: ICD-10-CM

## 2023-07-11 DIAGNOSIS — I25.10 ATHEROSCLEROTIC HEART DISEASE OF NATIVE CORONARY ARTERY WITHOUT ANGINA PECTORIS: Chronic | ICD-10-CM

## 2023-07-11 DIAGNOSIS — Z95.5 PRESENCE OF CORONARY ANGIOPLASTY IMPLANT AND GRAFT: Chronic | ICD-10-CM

## 2023-07-11 PROCEDURE — 70496 CT ANGIOGRAPHY HEAD: CPT | Mod: 26

## 2023-07-11 PROCEDURE — 70470 CT HEAD/BRAIN W/O & W/DYE: CPT | Mod: 26,59

## 2023-07-11 PROCEDURE — 70496 CT ANGIOGRAPHY HEAD: CPT

## 2023-07-11 PROCEDURE — 70470 CT HEAD/BRAIN W/O & W/DYE: CPT

## 2023-07-12 DIAGNOSIS — G93.9 DISORDER OF BRAIN, UNSPECIFIED: ICD-10-CM

## 2023-07-25 ENCOUNTER — APPOINTMENT (OUTPATIENT)
Dept: CARDIOLOGY | Facility: CLINIC | Age: 73
End: 2023-07-25
Payer: MEDICARE

## 2023-07-25 VITALS — BODY MASS INDEX: 23.8 KG/M2 | WEIGHT: 170 LBS | HEIGHT: 71 IN

## 2023-07-25 DIAGNOSIS — S06.5XAA TRAUMATIC SUBDURAL HEMORRHAGE WITH LOSS OF CONSCIOUSNESS STATUS UNKNOWN, INITIAL ENCOUNTER: ICD-10-CM

## 2023-07-25 PROCEDURE — 99214 OFFICE O/P EST MOD 30 MIN: CPT

## 2023-07-25 PROCEDURE — 93306 TTE W/DOPPLER COMPLETE: CPT

## 2023-07-25 RX ORDER — APIXABAN 5 MG/1
5 TABLET, FILM COATED ORAL
Qty: 180 | Refills: 3 | Status: ACTIVE | COMMUNITY
Start: 2023-05-08 | End: 1900-01-01

## 2023-07-25 NOTE — PROCEDURE
[No] : not [NSR] : normal sinus rhythm [ICD] : Implantable cardioverter-defibrillator [Longevity: ___ months] : The estimated remaining battery life is [unfilled] months [Lead Imp:  ___ohms] : lead impedance was [unfilled] ohms [Sensing Amplitude ___mv] : sensing amplitude was [unfilled] mv [___V @] : [unfilled] V [___ ms] : [unfilled] ms [None] : none [Asense-Vsense ___ %] : Asense-Vsense [unfilled]% [Asense-Vpace ___ %] : Asense-Vpace [unfilled]% [Apace-Vsense ___ %] : Apace-Vsense [unfilled]% [Apace-Vpace ___ %] : Apace-Vpace [unfilled]% [de-identified] : Reese MRI XT  [de-identified] : [id number] [de-identified] : [id number] [de-identified] : 12/21/21 [de-identified] : AAIR/DDDR [de-identified] :  [de-identified] : Many NSVT episodes, all appear to be SVT. Most recent on 2/28/23 for 4 seconds.

## 2023-07-25 NOTE — CARDIOLOGY SUMMARY
[de-identified] : 11/22: carotid: b/l plaque, less than 50% b/l ICA. \par 11/22: NO evidence AAA, mild atherosclerosis DA

## 2023-07-25 NOTE — PHYSICAL EXAM
[Normal S1, S2] : normal S1, S2 [Wheeze ____] : wheeze [unfilled] [General Appearance - Well Developed] : well developed [Normal Appearance] : normal appearance [Well Groomed] : well groomed [General Appearance - Well Nourished] : well nourished [No Deformities] : no deformities [General Appearance - In No Acute Distress] : no acute distress [Heart Rate And Rhythm] : heart rate and rhythm were normal [Heart Sounds] : normal S1 and S2 [Murmurs] : no murmurs present [Clean] : clean [Dry] : dry [Well-Healed] : well-healed [de-identified] : RRR

## 2023-07-25 NOTE — DISCUSSION/SUMMARY
[FreeTextEntry1] : PT NYHA CLASS 2 \par \par 5/8/23: PT WITH TRAUMATIC BRAIN BLEED \par d/w patient will start elqiuis on patient as large lv apical thrombus, sdm with daughter and wife. \par will repeat 2 d echo with contrast for LV thrombus \par hold aspirin and start in one month \par start eliquis now. gfr 64 \par \par 6/9/23: pt to resume amiodarone\par start meclizine for vertigo\par give compression stockings \par continue eliquis \par \par 7/25/23: pt can stop eliquis 48 hours prior to surgery and resume postop as has clot. \par pt is intermediate risk of perioperative cv complications and cv stable for surgery. pt has lv dysfunction and AICD avoid excessive fluid. \par pt has lv apical thrombus.

## 2023-07-25 NOTE — HISTORY OF PRESENT ILLNESS
[FreeTextEntry1] : PT WITH CAD S/P STENTS X3 10/2006, 2 PCI 12/2006, CABG X 3 [facility] 12/21 AFTER AICD D/C, ICM WITH LVEF 25%. HTN,  OLD MI, family h/o  POLYCYTHEMIA VERA  SDH. LV APICAL THROMBUS 2.5 x 1.3 cm in apex\par \par PMD: GITTERMAN \par EP:  SONYA \par \par \par PT GET phlebotomy  EVERY 2 WEEKS ? POLYCYTHEMIA VERA \par \par 2/7/23: pt here for f/u, pt was 100/70 to 90/70 standing up and says bp at home was 170/70. pt with no dizziness on standing, pt had phlebotomy 1 month ago.pt says stands in yard and stretches at time. \par 11/22: carotid: b/l plaque, less than 50% b/l ICA. \par 11/22: NO evidence AAA, mild atherosclerosis DA\par Pt says sob only with heavy exertion, pt has not had AICD check in over year. \par ntbnp: 335 HDL 39 \par LDL 91 \par \par \par 5/8/23:\par Patient states he had a seizure about two ago, pt had seizure and was in hospital after AICD firing. pt had to see neurosurgery and had TRAUMATIC SDH. \par  1. Left ventricular ejection fraction, by visual estimation, is 20 to 25%.\par  2. Severely decreased global left ventricular systolic function.\par  5. 2.5 x 1.3 cm filling defect on the contrast enhanced images consistent with LV apical thrombus.\par  6. Mildly enlarged right ventricle.\par  7. Mildly reduced RV systolic function.\par PT NOT FEELING WELL. \par NEUROSX: [facility] \par \par 6/9/23:\par echo before appointment \par pt was in hospital and had ? apical lv thrombus and SDH after VT requiring shock, pt may need vt ablation after clot resolves. pt is on elqiuis and amio 200 qd DR. HASSAN IS EPS. \par \par ECHO TODAY WITH LVEF improved 25% to 35% With contrast shows well organized thrombus in apex and improved vs prior. pt may need procedure, pt complains of lots of dizziness. \par spent over 60 minutes with patient and reviewed all echos with patient. \par pt say dizziness room spinnning. \par \par 7/25/23:\par pt preop with dr. shah for lung surgery for nodule vs ca, here for f/u of echo and preop risk assessment. pt had echo with lvef 25 to 35% and well organized thrombus in apex unchanged vs prior no freely mobile segments, pt is on eliquis. \par pt has not issues going up and downstairs at home and does it slowly and able to do gardening, pt with vertigo and dizziness at times with room spinning. \par  [de-identified] : \par

## 2023-07-28 ENCOUNTER — APPOINTMENT (OUTPATIENT)
Dept: OTOLARYNGOLOGY | Facility: CLINIC | Age: 73
End: 2023-07-28
Payer: MEDICARE

## 2023-07-28 DIAGNOSIS — D49.0 NEOPLASM OF UNSPECIFIED BEHAVIOR OF DIGESTIVE SYSTEM: ICD-10-CM

## 2023-07-28 PROCEDURE — 92557 COMPREHENSIVE HEARING TEST: CPT

## 2023-07-28 PROCEDURE — 92550 TYMPANOMETRY & REFLEX THRESH: CPT

## 2023-07-28 PROCEDURE — 99214 OFFICE O/P EST MOD 30 MIN: CPT | Mod: 25

## 2023-07-28 NOTE — REASON FOR VISIT
[Subsequent Evaluation] : a subsequent evaluation for [FreeTextEntry2] : SNHL, clogged ears, dizziness

## 2023-07-28 NOTE — ASSESSMENT
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 Bilateral SNHL. asymmetry seen.\par \par I reviewed CT images from 2 weeks ago, no mentioning of IAC abnormality.\par \par History and discussion with patient's wife as well. \par

## 2023-07-28 NOTE — HISTORY OF PRESENT ILLNESS
[FreeTextEntry1] : Patient returns today c/o SNHL, clogged ears, dizziness. States that he still having clogged sensation in ears. Constantly feeling off balance and finds himself feeling dizzy whenever he moves quickly. Audiogram performed today, here to discuss results.

## 2023-09-11 NOTE — ED ADULT NURSE NOTE - NSSUHOSCREENINGYN_ED_ALL_ED
Sharda Delcid called to report Nidhi Ruiz sugar has been high the passed couple day today it 175 , her gout is also active again and she is unable to take the prednisone that fransico esparza prescribed. Her husbands number is . [phone]  If you need to speak with him . He said if her sugars went up you wanted to know  pharmacy  Fax number in [facility] [phone]. Yes - the patient is able to be screened

## 2023-09-28 ENCOUNTER — APPOINTMENT (OUTPATIENT)
Dept: CARDIOLOGY | Facility: CLINIC | Age: 73
End: 2023-09-28

## 2023-09-28 ENCOUNTER — NON-APPOINTMENT (OUTPATIENT)
Age: 73
End: 2023-09-28

## 2023-09-28 ENCOUNTER — APPOINTMENT (OUTPATIENT)
Dept: CARDIOLOGY | Facility: CLINIC | Age: 73
End: 2023-09-28
Payer: MEDICARE

## 2023-09-28 PROCEDURE — 93295 DEV INTERROG REMOTE 1/2/MLT: CPT

## 2023-09-28 PROCEDURE — 93296 REM INTERROG EVL PM/IDS: CPT

## 2023-10-18 ENCOUNTER — APPOINTMENT (OUTPATIENT)
Dept: CARDIOLOGY | Facility: CLINIC | Age: 73
End: 2023-10-18
Payer: MEDICARE

## 2023-10-18 VITALS — WEIGHT: 173.25 LBS | BODY MASS INDEX: 24.25 KG/M2 | HEIGHT: 71 IN

## 2023-10-18 DIAGNOSIS — I25.2 OLD MYOCARDIAL INFARCTION: ICD-10-CM

## 2023-10-18 DIAGNOSIS — S09.90XS UNSPECIFIED INJURY OF HEAD, SEQUELA: ICD-10-CM

## 2023-10-18 PROCEDURE — 99214 OFFICE O/P EST MOD 30 MIN: CPT

## 2023-12-28 ENCOUNTER — NON-APPOINTMENT (OUTPATIENT)
Age: 73
End: 2023-12-28

## 2023-12-28 ENCOUNTER — APPOINTMENT (OUTPATIENT)
Dept: ELECTROPHYSIOLOGY | Facility: CLINIC | Age: 73
End: 2023-12-28

## 2023-12-28 ENCOUNTER — APPOINTMENT (OUTPATIENT)
Dept: CARDIOLOGY | Facility: CLINIC | Age: 73
End: 2023-12-28
Payer: MEDICARE

## 2023-12-28 PROCEDURE — 93295 DEV INTERROG REMOTE 1/2/MLT: CPT

## 2023-12-28 PROCEDURE — 93296 REM INTERROG EVL PM/IDS: CPT

## 2024-01-17 ENCOUNTER — APPOINTMENT (OUTPATIENT)
Dept: CARDIOLOGY | Facility: CLINIC | Age: 74
End: 2024-01-17
Payer: MEDICARE

## 2024-01-17 PROCEDURE — 93306 TTE W/DOPPLER COMPLETE: CPT

## 2024-01-26 ENCOUNTER — APPOINTMENT (OUTPATIENT)
Dept: OTOLARYNGOLOGY | Facility: CLINIC | Age: 74
End: 2024-01-26
Payer: MEDICARE

## 2024-01-26 DIAGNOSIS — R42 DIZZINESS AND GIDDINESS: ICD-10-CM

## 2024-01-26 DIAGNOSIS — H61.20 IMPACTED CERUMEN, UNSPECIFIED EAR: ICD-10-CM

## 2024-01-26 DIAGNOSIS — H90.5 UNSPECIFIED SENSORINEURAL HEARING LOSS: ICD-10-CM

## 2024-01-26 PROCEDURE — 69210 REMOVE IMPACTED EAR WAX UNI: CPT

## 2024-01-26 PROCEDURE — 99214 OFFICE O/P EST MOD 30 MIN: CPT | Mod: 25

## 2024-01-26 NOTE — PHYSICAL EXAM
[Normal] : mucosa is normal [Midline] : trachea located in midline position [de-identified] : left impacted wax cleaned with curette [] : [place] test is positive [de-identified] : right

## 2024-01-26 NOTE — HISTORY OF PRESENT ILLNESS
[FreeTextEntry1] : Patient returns today c/o SNHL, clogged ears, dizziness. Patient states he is still getting dizziness every day. C/o of room spinning lasts for seconds, lightheadedness, imbalance. Declines hearing aids. No further complaints.

## 2024-01-26 NOTE — ASSESSMENT
[FreeTextEntry1] : Wax found and cleaned. Cleaning well tolerated by patient. Patient felt improvement in cloginess after cleaning.  Patient to stop meclizine gradually.  Epley done.

## 2024-02-15 PROBLEM — I51.3 LEFT VENTRICULAR APICAL THROMBUS: Status: ACTIVE | Noted: 2024-02-15

## 2024-02-15 PROBLEM — I47.20 VENTRICULAR TACHYCARDIA: Status: ACTIVE | Noted: 2023-06-08

## 2024-02-15 PROBLEM — R06.02 SHORTNESS OF BREATH: Status: ACTIVE | Noted: 2022-10-04

## 2024-02-15 NOTE — PHYSICAL THERAPY INITIAL EVALUATION ADULT - REFERRING PHYSICIAN, REHAB EVAL
PAST SURGICAL HISTORY:  H/O tracheostomy     S/P endoscopy     S/P TVR (tricuspid valve repair)     S/P ventricular assist device      How Severe Is Your Birthmark?: mild Is This A New Presentation, Or A Follow-Up?: Skin Lesion DEBBIE Marin

## 2024-02-15 NOTE — CARDIOLOGY SUMMARY
[de-identified] : 11/22: carotid: b/l plaque, less than 50% b/l ICA. \par  11/22: NO evidence AAA, mild atherosclerosis DA

## 2024-02-15 NOTE — HISTORY OF PRESENT ILLNESS
[FreeTextEntry1] : PT WITH CAD S/P STENTS X3 10/2006, 2 PCI 12/2006, CABG X 3 [facility] 12/21 AFTER AICD Discharge, ICM WITH LVEF 25%. HTN,  OLD MI, family h/o  POLYCYTHEMIA VERA  SDH 2023. LV APICAL THROMBUS 2.5 x 1.3 cm in apex, ORTHOSTATIC HYPOTENSION, WEDGE RESECTION FOR LUNG CA 2023   PMD: ZORAIDA  EP:  SONYA   PT GOES FOR phlebotomy  EVERY 2 WEEKS POLYCYTHEMIA VERA  11/22: carotid: b/l plaque, less than 50% b/l ICA.  11/22: NO evidence AAA, mild atherosclerosis DA ntbnp: 335 HDL 39  LDL 91    5/8/23: Patient states he had a seizure about two ago, pt had seizure and was in hospital after AICD firing. pt had to see neurosurgery and had TRAUMATIC SDH.   1. Left ventricular ejection fraction, by visual estimation, is 20 to 25%.  2. Severely decreased global left ventricular systolic function.  5. 2.5 x 1.3 cm filling defect on the contrast enhanced images consistent with LV apical thrombus.  6. Mildly enlarged right ventricle.  7. Mildly reduced RV systolic function. PT NOT FEELING WELL.  NEUROSX: [facility]   6/9/23: echo before appointment  pt was in hospital and had ? apical lv thrombus and SDH after VT requiring shock, pt may need vt ablation after clot resolves. pt is on elqiuis and amio 200 qd DR. KILGORE IS EPS.   ECHO TODAY WITH LVEF improved 25% to 35% With contrast shows well organized thrombus in apex and improved vs prior. pt may need procedure, pt complains of lots of dizziness.  spent over 60 minutes with patient and reviewed all echos with patient.  pt say dizziness room spinnning.   7/25/23: pt preop with dr. shah for lung surgery for nodule vs ca, here for f/u of echo and preop risk assessment. pt had echo with lvef 25 to 35% and well organized thrombus in apex unchanged vs prior no freely mobile segments, pt is on eliquis.  pt has not issues going up and downstairs at home and does it slowly and able to do gardening, pt with vertigo and dizziness at times with room spinning.   10/18/23: PT S/P WEDGE RESECTION WITH DR. SHAH was CANCER BUT DOES NOT NEED CHEMO OR RADIATION. PT WALKING half a mile and doing ok, denies sob on meds. pt back on eliquis  PT STILL ON AMIODARONE 200 mg po qd   2/15/24:  1/17/24: LVEF 30 to 35% (mildly improved from past), distal anteroapical apical and apical lateral dyskinesis, e' setp: 0.04 m/s. LVOT VTI: 8.3 cm, CO: 2.09 l/min  mild AS. WELL ORGANIZED APICAL THROMBUS TO ANTEROAPICAL WALL.  pt needs f/u with dr. kilgore.  bloodwork?    [de-identified] : \par

## 2024-02-15 NOTE — PROCEDURE
[No] : not [NSR] : normal sinus rhythm [ICD] : Implantable cardioverter-defibrillator [Longevity: ___ months] : The estimated remaining battery life is [unfilled] months [Lead Imp:  ___ohms] : lead impedance was [unfilled] ohms [Sensing Amplitude ___mv] : sensing amplitude was [unfilled] mv [___V @] : [unfilled] V [___ ms] : [unfilled] ms [None] : none [Asense-Vsense ___ %] : Asense-Vsense [unfilled]% [Asense-Vpace ___ %] : Asense-Vpace [unfilled]% [Apace-Vsense ___ %] : Apace-Vsense [unfilled]% [Apace-Vpace ___ %] : Apace-Vpace [unfilled]% [de-identified] : Reese MRI XT  [de-identified] : [id number] [de-identified] : [id number] [de-identified] : 12/21/21 [de-identified] : AAIR/DDDR [de-identified] :  [de-identified] : Many NSVT episodes, all appear to be SVT. Most recent on 2/28/23 for 4 seconds.

## 2024-02-15 NOTE — DISCUSSION/SUMMARY
[FreeTextEntry1] : PT NYHA CLASS 2  4/23 PT WITH TRAUMATIC BRAIN BLEED  continue entresto,  ezetimbe/ atorvastatin  metoprolol  pt to use more frequent meclizine for vertigo symptoms  SEE DR. KILGORE TO DECIDE ON LONGTERM DOSE FOR AMIODARONE pt did not see yet  will d/w dr. kilgore  get echo  get bloodwork  f/u in 4 months

## 2024-02-15 NOTE — PHYSICAL EXAM
[Normal S1, S2] : normal S1, S2 [Wheeze ____] : wheeze [unfilled] [General Appearance - Well Developed] : well developed [Normal Appearance] : normal appearance [Well Groomed] : well groomed [General Appearance - Well Nourished] : well nourished [No Deformities] : no deformities [General Appearance - In No Acute Distress] : no acute distress [Heart Rate And Rhythm] : heart rate and rhythm were normal [Heart Sounds] : normal S1 and S2 [Murmurs] : no murmurs present [Clean] : clean [Dry] : dry [Well-Healed] : well-healed [de-identified] : RRR

## 2024-02-16 ENCOUNTER — APPOINTMENT (OUTPATIENT)
Dept: CARDIOLOGY | Facility: CLINIC | Age: 74
End: 2024-02-16

## 2024-02-16 DIAGNOSIS — R06.02 SHORTNESS OF BREATH: ICD-10-CM

## 2024-02-16 DIAGNOSIS — I47.20 VENTRICULAR TACHYCARDIA, UNSPECIFIED: ICD-10-CM

## 2024-02-16 DIAGNOSIS — I51.3 INTRACARDIAC THROMBOSIS, NOT ELSEWHERE CLASSIFIED: ICD-10-CM

## 2024-02-21 ENCOUNTER — APPOINTMENT (OUTPATIENT)
Dept: ELECTROPHYSIOLOGY | Facility: CLINIC | Age: 74
End: 2024-02-21
Payer: MEDICARE

## 2024-02-21 VITALS
DIASTOLIC BLOOD PRESSURE: 70 MMHG | HEIGHT: 71 IN | WEIGHT: 177 LBS | SYSTOLIC BLOOD PRESSURE: 110 MMHG | HEART RATE: 117 BPM | BODY MASS INDEX: 24.78 KG/M2

## 2024-02-21 PROCEDURE — 93000 ELECTROCARDIOGRAM COMPLETE: CPT | Mod: 59

## 2024-02-21 PROCEDURE — 99214 OFFICE O/P EST MOD 30 MIN: CPT | Mod: 25

## 2024-02-21 PROCEDURE — 93283 PRGRMG EVAL IMPLANTABLE DFB: CPT

## 2024-02-21 PROCEDURE — 93290 INTERROG DEV EVAL ICPMS IP: CPT | Mod: 26

## 2024-02-21 RX ORDER — AMIODARONE HYDROCHLORIDE 200 MG/1
200 TABLET ORAL DAILY
Qty: 45 | Refills: 1 | Status: ACTIVE | COMMUNITY
Start: 2023-06-08

## 2024-02-28 LAB
ALBUMIN SERPL ELPH-MCNC: 4.2 G/DL
ALP BLD-CCNC: 73 U/L
ALT SERPL-CCNC: 14 U/L
ANION GAP SERPL CALC-SCNC: 11 MMOL/L
AST SERPL-CCNC: 18 U/L
BILIRUB SERPL-MCNC: 0.4 MG/DL
BUN SERPL-MCNC: 15 MG/DL
CALCIUM SERPL-MCNC: 9.5 MG/DL
CHLORIDE SERPL-SCNC: 109 MMOL/L
CO2 SERPL-SCNC: 23 MMOL/L
CREAT SERPL-MCNC: 1.4 MG/DL
EGFR: 53 ML/MIN/1.73M2
GLUCOSE SERPL-MCNC: 107 MG/DL
HCT VFR BLD CALC: 49 %
HGB BLD-MCNC: 14.7 G/DL
MAGNESIUM SERPL-MCNC: 2 MG/DL
MCHC RBC-ENTMCNC: 26 PG
MCHC RBC-ENTMCNC: 30 G/DL
MCV RBC AUTO: 86.6 FL
PLATELET # BLD AUTO: 169 K/UL
PMV BLD AUTO: 0 /100 WBCS
PMV BLD: 10.5 FL
POTASSIUM SERPL-SCNC: 4.8 MMOL/L
PROT SERPL-MCNC: 7.1 G/DL
RBC # BLD: 5.66 M/UL
RBC # FLD: 18.5 %
SODIUM SERPL-SCNC: 143 MMOL/L
TSH SERPL-ACNC: 3.65 UIU/ML
WBC # FLD AUTO: 6.94 K/UL

## 2024-03-18 NOTE — ADDENDUM
[FreeTextEntry1] : Chika BOUDREAUX assisted in documentation on 03/18/2024 acting as a scribe for Dr. Calvin Laguerre.

## 2024-03-18 NOTE — PROCEDURE
[de-identified] : 72 BPM [de-identified] : [place] [de-identified] : Reese MRI XT  [id number] [de-identified] : [id number] [de-identified] : 12/21/2021 [de-identified] : COURTNEY [de-identified] :  [de-identified] : AP: 64.4% // : <0.1% Multiple high v-rate episodes c/w SVT with avg. v-rate = 154-194bpm.  OptiVol fluid WNL.  Transmitting on [employer].

## 2024-03-18 NOTE — HISTORY OF PRESENT ILLNESS
[de-identified] : Mr. Jeronimo is a 72 year-old male with hx of CAD,  PCI, MI s/p CABG, ischemic cardiomyopathy s/p ICD, HTN, dyslipidemia, COPD, VT s/p ICD shock, is here to establish care.   Was followed by Dr. Padron  05/25/2023: Feels fine. Had a sustained monomorphic VT, needed ICD shock. Was admitted at [facility]. Found to have LV thrombus. Started on anticoagulation. 06/29/2023: He is here for echo report and to discuss further management of VT. Accompanied by his wife. Feels dizzy at times.  02/21/2024: Feels fine.    Accompanied by son.   Denies chest pain, shortness of breath, palpitation, dizziness or LOC except noted above.  EKG (02/21/2024): SR   EKG (06/29/2023): SR @ 78, , QRS 86, QTc 406 EKG (05/25/2023): SR  EKG (03/02/2023): SR  Cardio: Dr. Elizalde

## 2024-03-18 NOTE — ASSESSMENT
[FreeTextEntry1] : ## Ischemic cardiomyopathy  # VT s/p DC-ICD (MDT, 21, non-dep) ## Cardiotoxic Drug Monitoring   - ICD interrogation shows normally functioning DC-ICD. Battery life ok. Optivol below threshold. No new events.   - Echo showed organized LV thrombus. Considering one episode of VT, doing fine on Amiodarone, no further episodes of VT, and organized LV thrombus, we will continue with medical management for now. Continue with Amiodarone 200 mg for now.  - Will reduce Amiodarone to 100 mg at next visit if there are no further VT episodes. - Dizziness is likely due to hypotension versus external ear problem. External ear treatment as per PCP. Will DC Amlodipine. If blood pressure is better, can increase his Entresto.  - TSH/LFT q6 months. - GDMT as per cardiology.  - Remote monitoring. - RTC in 6 months.

## 2024-03-22 ENCOUNTER — APPOINTMENT (OUTPATIENT)
Dept: OTOLARYNGOLOGY | Facility: CLINIC | Age: 74
End: 2024-03-22

## 2024-03-28 PROBLEM — I50.22 CHRONIC SYSTOLIC CONGESTIVE HEART FAILURE: Status: ACTIVE | Noted: 2022-10-07

## 2024-03-28 PROBLEM — I65.23 ARTERIOSCLEROSIS OF BOTH CAROTID ARTERIES: Status: ACTIVE | Noted: 2022-10-07

## 2024-03-28 PROBLEM — Z95.810 AICD (AUTOMATIC CARDIOVERTER/DEFIBRILLATOR) PRESENT: Status: ACTIVE | Noted: 2022-10-07

## 2024-03-28 PROBLEM — I48.91 A-FIB: Status: ACTIVE | Noted: 2018-03-07

## 2024-03-28 PROBLEM — I10 HYPERTENSION: Status: ACTIVE | Noted: 2018-03-07

## 2024-03-28 PROBLEM — Z95.1 S/P CABG (CORONARY ARTERY BYPASS GRAFT): Status: ACTIVE | Noted: 2021-12-29

## 2024-03-28 PROBLEM — Z98.61 CORONARY ANGIOPLASTY STATUS: Status: ACTIVE | Noted: 2022-10-04

## 2024-03-28 PROBLEM — J43.9 EMPHYSEMA, UNSPECIFIED: Status: ACTIVE | Noted: 2022-10-04

## 2024-03-29 ENCOUNTER — APPOINTMENT (OUTPATIENT)
Dept: CARDIOLOGY | Facility: CLINIC | Age: 74
End: 2024-03-29
Payer: MEDICARE

## 2024-03-29 VITALS — BODY MASS INDEX: 24.78 KG/M2 | HEIGHT: 71 IN | WEIGHT: 177 LBS

## 2024-03-29 VITALS — DIASTOLIC BLOOD PRESSURE: 80 MMHG | HEART RATE: 70 BPM | SYSTOLIC BLOOD PRESSURE: 128 MMHG

## 2024-03-29 DIAGNOSIS — I10 ESSENTIAL (PRIMARY) HYPERTENSION: ICD-10-CM

## 2024-03-29 DIAGNOSIS — I48.91 UNSPECIFIED ATRIAL FIBRILLATION: ICD-10-CM

## 2024-03-29 DIAGNOSIS — I50.22 CHRONIC SYSTOLIC (CONGESTIVE) HEART FAILURE: ICD-10-CM

## 2024-03-29 DIAGNOSIS — Z95.810 PRESENCE OF AUTOMATIC (IMPLANTABLE) CARDIAC DEFIBRILLATOR: ICD-10-CM

## 2024-03-29 DIAGNOSIS — Z98.61 CORONARY ANGIOPLASTY STATUS: ICD-10-CM

## 2024-03-29 DIAGNOSIS — Z00.00 ENCOUNTER FOR GENERAL ADULT MEDICAL EXAMINATION W/OUT ABNORMAL FINDINGS: ICD-10-CM

## 2024-03-29 DIAGNOSIS — J43.9 EMPHYSEMA, UNSPECIFIED: ICD-10-CM

## 2024-03-29 DIAGNOSIS — Z95.1 PRESENCE OF AORTOCORONARY BYPASS GRAFT: ICD-10-CM

## 2024-03-29 DIAGNOSIS — I65.23 OCCLUSION AND STENOSIS OF BILATERAL CAROTID ARTERIES: ICD-10-CM

## 2024-03-29 PROCEDURE — 99214 OFFICE O/P EST MOD 30 MIN: CPT

## 2024-03-29 RX ORDER — AMLODIPINE BESYLATE 5 MG/1
5 TABLET ORAL DAILY
Qty: 90 | Refills: 3 | Status: ACTIVE | COMMUNITY
Start: 1900-01-01 | End: 1900-01-01

## 2024-03-29 NOTE — CARDIOLOGY SUMMARY
[de-identified] : 11/22: carotid: b/l plaque, less than 50% b/l ICA. \par  11/22: NO evidence AAA, mild atherosclerosis DA

## 2024-03-29 NOTE — PHYSICAL EXAM
[Normal S1, S2] : normal S1, S2 [Wheeze ____] : wheeze [unfilled] [General Appearance - Well Developed] : well developed [Normal Appearance] : normal appearance [Well Groomed] : well groomed [General Appearance - Well Nourished] : well nourished [General Appearance - In No Acute Distress] : no acute distress [No Deformities] : no deformities [Heart Rate And Rhythm] : heart rate and rhythm were normal [Heart Sounds] : normal S1 and S2 [Murmurs] : no murmurs present [Clean] : clean [Dry] : dry [Well-Healed] : well-healed [de-identified] : RRR

## 2024-03-29 NOTE — DISCUSSION/SUMMARY
[FreeTextEntry1] : PT NYHA CLASS 2  4/23 PT WITH TRAUMATIC BRAIN BLEED  continue entresto,  ezetimbe/ atorvastatin  metoprolol  pt to use more frequent meclizine for vertigo symptoms  had amio reduced  get bloodwork  f/u in 4 months  BPV: meclizine not working, f/u with ENT - was to go for therapy.

## 2024-03-29 NOTE — HISTORY OF PRESENT ILLNESS
[FreeTextEntry1] : PT WITH CAD S/P STENTS X3 10/2006, 2 PCI 12/2006, CABG X 3 [facility] 12/21 AFTER AICD Discharge, ICM WITH LVEF 25%. HTN,  OLD MI, family h/o  POLYCYTHEMIA VERA  SDH 2023. LV APICAL THROMBUS 2.5 x 1.3 cm in apex, ORTHOSTATIC HYPOTENSION, WEDGE RESECTION FOR LUNG CA 2023   PMD: ZORAIDA  EP:  SONYA   PT GOES FOR phlebotomy  EVERY 2 WEEKS POLYCYTHEMIA VERA  11/22: carotid: b/l plaque, less than 50% b/l ICA.  11/22: NO evidence AAA, mild atherosclerosis DA ntbnp: 335 HDL 39  LDL 91    5/8/23: Patient states he had a seizure about two ago, pt had seizure and was in hospital after AICD firing. pt had to see neurosurgery and had TRAUMATIC SDH.   1. Left ventricular ejection fraction, by visual estimation, is 20 to 25%.  2. Severely decreased global left ventricular systolic function.  5. 2.5 x 1.3 cm filling defect on the contrast enhanced images consistent with LV apical thrombus.  6. Mildly enlarged right ventricle.  7. Mildly reduced RV systolic function. PT NOT FEELING WELL.  NEUROSX: [facility]   6/9/23: echo before appointment  pt was in hospital and had ? apical lv thrombus and SDH after VT requiring shock, pt may need vt ablation after clot resolves. pt is on elqiuis and amio 200 qd DR. HASSAN IS EPS.   ECHO TODAY WITH LVEF improved 25% to 35% With contrast shows well organized thrombus in apex and improved vs prior. pt may need procedure, pt complains of lots of dizziness.  spent over 60 minutes with patient and reviewed all echos with patient.  pt say dizziness room spinnning.   7/25/23: pt preop with dr. shah for lung surgery for nodule vs ca, here for f/u of echo and preop risk assessment. pt had echo with lvef 25 to 35% and well organized thrombus in apex unchanged vs prior no freely mobile segments, pt is on eliquis.  pt has not issues going up and downstairs at home and does it slowly and able to do gardening, pt with vertigo and dizziness at times with room spinning.   10/18/23: PT S/P WEDGE RESECTION WITH DR. SHAH was CANCER BUT DOES NOT NEED CHEMO OR RADIATION. PT WALKING half a mile and doing ok, denies sob on meds. pt back on eliquis  PT STILL ON AMIODARONE 200 mg po qd   3/29/24:  1/17/24: LVEF 30 to 35% (mildly improved from past), distal anteroapical apical and apical lateral dyskinesis, e' sept: 0.04 m/s. LVOT VTI: 8.3 cm, CO: 2.09 l/min  mild AS. WELL ORGANIZED APICAL THROMBUS TO ANTEROAPICAL WALL.  APPRECIATE EP note, pt to continue amiodarone but reduced to 100 mg daily as no events.  2/24: GFR: 53 bun/cr: 15/1.4,  pt feeling ok and not very active. pt says not sob, pt does walk on flatground with no issues. pt not sure if can play golf.  no bleeding.  pt still with vertigo intermittent off and on not taking MECLIZINE NOT WORKING.   [de-identified] : \par

## 2024-03-29 NOTE — PROCEDURE
[No] : not [NSR] : normal sinus rhythm [ICD] : Implantable cardioverter-defibrillator [Longevity: ___ months] : The estimated remaining battery life is [unfilled] months [Lead Imp:  ___ohms] : lead impedance was [unfilled] ohms [Sensing Amplitude ___mv] : sensing amplitude was [unfilled] mv [___V @] : [unfilled] V [___ ms] : [unfilled] ms [None] : none [Asense-Vsense ___ %] : Asense-Vsense [unfilled]% [Asense-Vpace ___ %] : Asense-Vpace [unfilled]% [Apace-Vpace ___ %] : Apace-Vpace [unfilled]% [Apace-Vsense ___ %] : Apace-Vsense [unfilled]% [de-identified] : Reese MRI XT  [de-identified] : [id number] [de-identified] : [id number] [de-identified] : 12/21/21 [de-identified] : AAIR/DDDR [de-identified] :  [de-identified] : Many NSVT episodes, all appear to be SVT. Most recent on 2/28/23 for 4 seconds.

## 2024-04-10 NOTE — ED ADULT NURSE NOTE - NSFALLRSKASSESASSIST_ED_ALL_ED
Speech-Language Pathology    Outpatient Speech-Language Pathology Treatment      Patient Name: Brii Reeves  MRN: [id number]  : 1982  Today's Date: 04/10/24  Time Calculation  Start Time: 1435  Stop Time: 1520  Time Calculation (min): 45 min    Current Problem:    Cognitive deficits   Aphasia         SLP Assessment:  Patient came into the session today and stated that she has been testing herself in real-life situations.  She went to the grocery store without a list and was able to successively purchased all of the items except for 2.  She stated that she realized that she forgot 2 of the items when she was FDC home.  Suggested to patient that she make a list and put it in her pocket while she is grocery shopping and then look at the list prior to checking out to ensure that she has all the items that she needs for the day.  Also suggested to patient that having a list is not a bad thing it is just a change from what she previously was able to do.  Patient agreed with plan.  Patient was able to remember 5 of 5 names of pictures of 5 meals from her previous session that was completed on 2024.  She was able to look at a picture and remember things from the picture when asked 10 questions about the picture after several minutes had passed.  Her short-term memory skills have significantly improved.  Recommending that goal 2 and 3 be discharged as patient has met them previously.  Adding goal #7 to patient's plan of care.  See goal 7 below for details.  Continue with plan of care.  Patient has 5 speech therapy sessions however she may not need all 5 sessions prior to discharge.       Plan:   Speech therapy is recommended 1 time per week for 12 weeks.        Subjective   Patient was seen today from 1:00 PM until 1:45 PM for a 45-minute speech-language therapy session.       Insurance:  Reviewed: Yes  Number of Authorized Visits:  12  Total Number of Visits:  7  Onset Date: 10/14/2023      Objective:  1.   Patient will be able to complete executive functioning/visual-spatial task with 90% accuracy.  2.  Patient will be able to identify 10 items in a category for divergent categorization task.  (Goal met)  3.  Patient will be able to complete attention task with 90% accuracy. (Goal Met)  4.  Patient will be able to perform at conversational speech level without word finding deficits with 90% accuracy.  5.  Patient will be able to use relaxation breathing techniques, visualization techniques, and self-awareness techniques to control/reduce her emotional liability.  6.  Patient will be able to complete a home program daily as reported by patient during treatment session.  7.  Patient will be able to complete divided attention with 100% accuracy.         Outpatient Education:   Educated patient again regarding compensatory memory techniques and strategies to use at home and in the day every day situations.                no

## 2024-05-20 ENCOUNTER — RX RENEWAL (OUTPATIENT)
Age: 74
End: 2024-05-20

## 2024-05-20 RX ORDER — METOPROLOL SUCCINATE 50 MG/1
50 TABLET, EXTENDED RELEASE ORAL
Qty: 90 | Refills: 3 | Status: ACTIVE | COMMUNITY
Start: 1900-01-01 | End: 1900-01-01

## 2024-05-21 ENCOUNTER — NON-APPOINTMENT (OUTPATIENT)
Age: 74
End: 2024-05-21

## 2024-05-22 ENCOUNTER — APPOINTMENT (OUTPATIENT)
Dept: CARDIOLOGY | Facility: CLINIC | Age: 74
End: 2024-05-22
Payer: MEDICARE

## 2024-05-22 PROCEDURE — 93295 DEV INTERROG REMOTE 1/2/MLT: CPT

## 2024-05-22 PROCEDURE — 93296 REM INTERROG EVL PM/IDS: CPT

## 2024-06-12 NOTE — ED PROVIDER NOTE - PMH
CAD (coronary artery disease)  HAD CARDIAC CATH WITH STENTS 2006  CAD (coronary artery disease)    COPD (chronic obstructive pulmonary disease)    HTN (hypertension)    Hypercholesteremia    Myocardial infarction  2006 Detail Level: Zone Plan: Referred to Jaclyn for IPL

## 2024-06-12 NOTE — PATIENT PROFILE ADULT - FUNCTIONAL ASSESSMENT - DAILY ACTIVITY SECTION LABEL
"Dental procedures in this visit     - LIMITED ORAL EVALUATION - PROBLEM FOCUSED (Completed)     Service provider: David Raymundo DMD     Billing provider: David Raymundo DMD     - INTRAORAL - PERIAPICAL FIRST RADIOGRAPHIC IMAGE 19 (Completed)     Service provider: David Raymundo DMD     Billing provider: David Raymundo DMD     - BITEWING - SINGLE RADIOGRAPHIC IMAGE 19 (Completed)     Service provider: David Raymundo DMD     Billing provider: David Raymundo DMD     Subjective   Patient ID: Briana Walker is a 16 y.o. female.  No chief complaint on file.    HPI  The following portions of the chart were reviewed this encounter and updated as appropriate:    Tobacco  Allergies  Meds  Problems  Med Hx  Surg Hx  Fam Hx           Objective   Soft Tissue Exam  No findings documented this visit      Dental Exam    Radiographic Interpretation:   Associated radiographs for today's visit were reviewed and finding(s) were discussed with the patient.   Findings include: PA tooth #19 BW #19 WNL  Hard Tissue Exam:  No decay  Reference tooth chart for additional findings.    Assessment & Plan   Problem List Items Addressed This Visit    None  Visit Diagnoses       Encounter for dental examination    -  Primary    Relevant Orders    19 BITEWING - SINGLE RADIOGRAPHIC IMAGE (Completed)    19 INTRAORAL - PERIAPICAL FIRST RADIOGRAPHIC IMAGE (Completed)    LIMITED ORAL EVALUATION - PROBLEM FOCUSED (Completed)        17 yo female patient presents with chief complaint: \"I feel like a needle sensation on my lower left crown when I bite. It radiated around my mouth. I'm getting my braces today so I wanted to make my tooth is okay\" Upon radiographic exam, #19 appears wnl. Open clinical exam, #19 hitting hard with occlusal paper. Using a zirconia adjustment kit, I decrease occlusion significantly. I recommended that the patient call if it has not improved in two weeks or " Stay well-hydrated.  Return to emergency department as needed for worsening of symptoms.   get worse. She understood.    Kathya 4/4    NV: recall    NV: recall   .

## 2024-06-14 NOTE — ED PROVIDER NOTE - NS_EDPROVIDERDISPOUSERTYPE_ED_A_ED
Samra Santillan is a 24 year old  at 12w2d who presents for routine prenatal visit. See updated prenatal vitals flowsheet and problem list. RN/CMA visit notes reviewed.     Samra is doing well. She reports continued food aversions and decreased appetite. No vomiting. Reassurance and supportive measures discussed. She is otherwise without concerns. Prenatal labs reviewed and wnl. Anatomy US ordered and timing reviewed.     Follow-up in 4 week(s) or sooner as needed. All questions were answered. She verbalized an understanding and was in agreement with plan.     Edie Grayson MD    Attending Attestation (For Attendings USE Only)...

## 2024-06-21 NOTE — ANESTHESIA FOLLOW-UP NOTE - NSRECOMMEND_GEN_ALL_CORE
[facility] INTERNAL MEDICINE    Visit Date:  6/6/2024  Patient:  Sp Jimenez  YOB: 1945    Assessment & Plan     Vaginal candidiasis: Will treat with Diflucan.  She was recently treated with antibiotics for UTI.    Neuropathy: Will prescribe Lyrica.  Reassess next visit.    Allergic rhinitis: Continue Flonase neuropathy: Will prescribe Lyrica.  Reassess next visit.    Anxiety: Continue Lexapro.  Reassess next visit.    Hypertension: Blood pressure controlled.  Continue losartan and hydrochlorothiazide.    Hypothyroidism: Continue levothyroxine.  Will continue to monitor.     Diagnosis Orders   1. Hypertension, unspecified type  CBC with Auto Differential    Comprehensive Metabolic Panel      2. Hypothyroidism, unspecified type  TSH with Reflex      3. IFG (impaired fasting glucose)  Hemoglobin A1C      4. Neuropathy  pregabalin (LYRICA) 75 MG capsule      5. Vaginal yeast infection            Chief Complaint  Other (Patient has a lot of question for Dr Maguire, lyrica was give to her by the son ) and Vaginitis (Patient thinks she has yeast infection )    History of Present Illness   Reports that she thinks she might have a yeast infection, she was recently treated with antibiotics for UTI.  Says that she has vaginal discharge that is normal for the last couple of days.  Denies fever, chills.  No pelvic pain.    Moreover, says that she previously used Lyrica and seems that it helped her for her neuropathy, I advised that we can try it.  I counseled regarding side effects and she verbalized understanding.    Moreover, she reports that she has been having knee pain, that seems to be increasing in severity.  She is being planned for knee replacement, asks whether she needs clearance.  I advised that since she follows with cardiology, I would need to obtain clearance from them as well, and her son verbalized understanding.    Objective  /74 (Site: Left Upper Arm, Position: Sitting, Cuff  None

## 2024-07-29 ENCOUNTER — APPOINTMENT (OUTPATIENT)
Dept: CARDIOLOGY | Facility: CLINIC | Age: 74
End: 2024-07-29
Payer: MEDICARE

## 2024-07-29 VITALS — HEIGHT: 71 IN | WEIGHT: 176 LBS | BODY MASS INDEX: 24.64 KG/M2

## 2024-07-29 VITALS — SYSTOLIC BLOOD PRESSURE: 120 MMHG | DIASTOLIC BLOOD PRESSURE: 80 MMHG | HEART RATE: 74 BPM

## 2024-07-29 DIAGNOSIS — I25.2 OLD MYOCARDIAL INFARCTION: ICD-10-CM

## 2024-07-29 DIAGNOSIS — I48.91 UNSPECIFIED ATRIAL FIBRILLATION: ICD-10-CM

## 2024-07-29 DIAGNOSIS — R42 DIZZINESS AND GIDDINESS: ICD-10-CM

## 2024-07-29 DIAGNOSIS — Z98.61 CORONARY ANGIOPLASTY STATUS: ICD-10-CM

## 2024-07-29 DIAGNOSIS — Z95.810 PRESENCE OF AUTOMATIC (IMPLANTABLE) CARDIAC DEFIBRILLATOR: ICD-10-CM

## 2024-07-29 DIAGNOSIS — Z95.1 PRESENCE OF AORTOCORONARY BYPASS GRAFT: ICD-10-CM

## 2024-07-29 DIAGNOSIS — I50.22 CHRONIC SYSTOLIC (CONGESTIVE) HEART FAILURE: ICD-10-CM

## 2024-07-29 DIAGNOSIS — I51.3 INTRACARDIAC THROMBOSIS, NOT ELSEWHERE CLASSIFIED: ICD-10-CM

## 2024-07-29 DIAGNOSIS — I65.23 OCCLUSION AND STENOSIS OF BILATERAL CAROTID ARTERIES: ICD-10-CM

## 2024-07-29 PROCEDURE — G2211 COMPLEX E/M VISIT ADD ON: CPT

## 2024-07-29 PROCEDURE — 99214 OFFICE O/P EST MOD 30 MIN: CPT

## 2024-07-30 LAB
ALBUMIN SERPL ELPH-MCNC: 4.2 G/DL
ALP BLD-CCNC: 77 U/L
ALT SERPL-CCNC: 13 U/L
ANION GAP SERPL CALC-SCNC: 14 MMOL/L
AST SERPL-CCNC: 17 U/L
BILIRUB SERPL-MCNC: 0.2 MG/DL
BUN SERPL-MCNC: 21 MG/DL
CALCIUM SERPL-MCNC: 9.2 MG/DL
CHLORIDE SERPL-SCNC: 107 MMOL/L
CHOLEST SERPL-MCNC: 209 MG/DL
CO2 SERPL-SCNC: 20 MMOL/L
CREAT SERPL-MCNC: 1.48 MG/DL
EGFR: 49 ML/MIN/1.73M2
ESTIMATED AVERAGE GLUCOSE: 126 MG/DL
GLUCOSE SERPL-MCNC: 98 MG/DL
HBA1C MFR BLD HPLC: 6 %
HCT VFR BLD CALC: 49.4 %
HDLC SERPL-MCNC: 36 MG/DL
HGB BLD-MCNC: 14.6 G/DL
LDLC SERPL CALC-MCNC: 146 MG/DL
MCHC RBC-ENTMCNC: 26 PG
MCHC RBC-ENTMCNC: 29.6 GM/DL
MCV RBC AUTO: 87.9 FL
NONHDLC SERPL-MCNC: 173 MG/DL
NT-PROBNP SERPL-MCNC: 404 PG/ML
PLATELET # BLD AUTO: 166 K/UL
POTASSIUM SERPL-SCNC: 4.7 MMOL/L
PROT SERPL-MCNC: 7 G/DL
RBC # BLD: 5.62 M/UL
RBC # FLD: 19.9 %
SODIUM SERPL-SCNC: 141 MMOL/L
TRIGL SERPL-MCNC: 153 MG/DL
TSH SERPL-ACNC: 4.24 UIU/ML
WBC # FLD AUTO: 7.1 K/UL

## 2024-07-30 RX ORDER — EVOLOCUMAB 140 MG/ML
140 INJECTION, SOLUTION SUBCUTANEOUS
Qty: 6 | Refills: 3 | Status: ACTIVE | COMMUNITY
Start: 2024-07-30 | End: 1900-01-01

## 2024-07-30 NOTE — DISCUSSION/SUMMARY
[FreeTextEntry1] : NYHA CLASS 2  4/23 PT WITH TRAUMATIC BRAIN BLEED  continue entresto,24/26 mg bid  continue ezetimbe 10  atorvastatin 40 mg po qhs  metoprolol er 50 mg po qd  pt to use more frequent meclizine for vertigo symptoms  continue amiodarone 100 mg daily  BPV: meclizine not working, f/u with ENT - was to go for therapy.  get bloodwork Carotid f/u in 4 months

## 2024-07-30 NOTE — CARDIOLOGY SUMMARY
[de-identified] : 11/22: carotid: b/l plaque, less than 50% b/l ICA. \par  11/22: NO evidence AAA, mild atherosclerosis DA

## 2024-07-30 NOTE — CARDIOLOGY SUMMARY
[de-identified] : 11/22: carotid: b/l plaque, less than 50% b/l ICA. \par  11/22: NO evidence AAA, mild atherosclerosis DA

## 2024-07-30 NOTE — PROCEDURE
[No] : not [NSR] : normal sinus rhythm [ICD] : Implantable cardioverter-defibrillator [Longevity: ___ months] : The estimated remaining battery life is [unfilled] months [Lead Imp:  ___ohms] : lead impedance was [unfilled] ohms [Sensing Amplitude ___mv] : sensing amplitude was [unfilled] mv [___V @] : [unfilled] V [___ ms] : [unfilled] ms [None] : none [Asense-Vsense ___ %] : Asense-Vsense [unfilled]% [Asense-Vpace ___ %] : Asense-Vpace [unfilled]% [Apace-Vsense ___ %] : Apace-Vsense [unfilled]% [Apace-Vpace ___ %] : Apace-Vpace [unfilled]% [de-identified] : Reese MRI XT  [de-identified] : [id number] [de-identified] : [id number] [de-identified] : 12/21/21 [de-identified] : AAIR/DDDR [de-identified] :  [de-identified] : Many NSVT episodes, all appear to be SVT. Most recent on 2/28/23 for 4 seconds.

## 2024-07-30 NOTE — PROCEDURE
[No] : not [NSR] : normal sinus rhythm [ICD] : Implantable cardioverter-defibrillator [Longevity: ___ months] : The estimated remaining battery life is [unfilled] months [Lead Imp:  ___ohms] : lead impedance was [unfilled] ohms [Sensing Amplitude ___mv] : sensing amplitude was [unfilled] mv [___V @] : [unfilled] V [___ ms] : [unfilled] ms [None] : none [Asense-Vsense ___ %] : Asense-Vsense [unfilled]% [Asense-Vpace ___ %] : Asense-Vpace [unfilled]% [Apace-Vsense ___ %] : Apace-Vsense [unfilled]% [Apace-Vpace ___ %] : Apace-Vpace [unfilled]% [de-identified] : Reese MRI XT  [de-identified] : [id number] [de-identified] : [id number] [de-identified] : 12/21/21 [de-identified] : AAIR/DDDR [de-identified] :  [de-identified] : Many NSVT episodes, all appear to be SVT. Most recent on 2/28/23 for 4 seconds.

## 2024-07-30 NOTE — HISTORY OF PRESENT ILLNESS
[FreeTextEntry1] : PT with CAD S/P STENTS X3 10/2006, 2 PCI 2006, CABG X 3 [facility]  AFTER AICD Discharge, ICM WITH LVEF 25%. HTN, OLD MI, family h/o POLYCYTHEMIA VERA  SDH . LV APICAL THROMBUS 2.5 x 1.3 cm in apex, BPV, ORTHOSTATIC HYPOTENSION, WEDGE RESECTION FOR LUNG CA    PMD: ZORAIDA  EP:  SONYA   PT GOES FOR phlebotomy  EVERY 2 WEEKS POLYCYTHEMIA VERA  : carotid: b/l plaque, less than 50% b/l ICA.  : NO evidence AAA, mild atherosclerosis DA ntbnp: 335 HDL 39  LDL 91   23: Patient states he had a seizure about two ago, pt had seizure and was in hospital after AICD firing. pt had to see neurosurgery and had TRAUMATIC SDH.   1. Left ventricular ejection fraction, by visual estimation, is 20 to 25%.  2. Severely decreased global left ventricular systolic function.  5. 2.5 x 1.3 cm filling defect on the contrast enhanced images consistent with LV apical thrombus.  6. Mildly enlarged right ventricle.  7. Mildly reduced RV systolic function. PT NOT FEELING WELL.  NEUROSX: [facility]   23: echo before appointment  pt was in hospital and had ? apical lv thrombus and SDH after VT requiring shock, pt may need vt ablation after clot resolves. pt is on elqiuis and amio 200 qd DR. HASSAN IS EPS.   ECHO TODAY WITH LVEF improved 25% to 35% With contrast shows well organized thrombus in apex and improved vs prior. pt may need procedure, pt complains of lots of dizziness.  spent over 60 minutes with patient and reviewed all echos with patient.  pt say dizziness room spinnning.   23: pt preop with dr. shah for lung surgery for nodule vs ca, here for f/u of echo and preop risk assessment. pt had echo with lvef 25 to 35% and well organized thrombus in apex unchanged vs prior no freely mobile segments, pt is on eliquis.  pt has not issues going up and downstairs at home and does it slowly and able to do gardening, pt with vertigo and dizziness at times with room spinning.   10/18/23: PT S/P WEDGE RESECTION WITH DR. SHAH was CANCER BUT DOES NOT NEED CHEMO OR RADIATION. PT WALKING half a mile and doing ok, denies sob on meds. pt back on eliquis  PT STILL ON AMIODARONE 200 mg po qd   3/29/24:  24: LVEF 30 to 35% (mildly improved from past), distal anteroapical apical and apical lateral dyskinesis, e' sept: 0.04 m/s. LVOT VTI: 8.3 cm, CO: 2.09 l/min  mild AS. WELL ORGANIZED APICAL THROMBUS TO ANTEROAPICAL WALL.  APPRECIATE EP note, pt to continue amiodarone but reduced to 100 mg daily as no events.  : GFR: 53 bun/cr: 15/1.4,  pt feeling ok and not very active. pt says not sob, pt does walk on flatground with no issues. pt not sure if can play golf.  no bleeding.  pt still with vertigo intermittent off and on not taking MECLIZINE NOT WORKING.   24: BPV on meclizine was not working was to see ENT for PT.  pt does not want hearing aids and did not get PT, he has debilitating verigo but has been resistant to do anything.  pt has not passed out.  pt forgot to do bloodtest. pt did not eat anything. no bleeding problems.  pt walks a litttle bit, pt says vertigo intermittent with bending now and leaning over.   24: GFR: 49, BNP: 404, HDL: 36, T, LDL:146, A1C: 6, TSH: 4.24 24: LDL: 146 on Atorvastatin and Zetia. Send Repatha.  [de-identified] : \par

## 2024-07-30 NOTE — CARDIOLOGY SUMMARY
[de-identified] : 11/22: carotid: b/l plaque, less than 50% b/l ICA. \par  11/22: NO evidence AAA, mild atherosclerosis DA

## 2024-07-30 NOTE — PROCEDURE
[No] : not [NSR] : normal sinus rhythm [ICD] : Implantable cardioverter-defibrillator [Longevity: ___ months] : The estimated remaining battery life is [unfilled] months [Lead Imp:  ___ohms] : lead impedance was [unfilled] ohms [Sensing Amplitude ___mv] : sensing amplitude was [unfilled] mv [___V @] : [unfilled] V [___ ms] : [unfilled] ms [None] : none [Asense-Vsense ___ %] : Asense-Vsense [unfilled]% [Asense-Vpace ___ %] : Asense-Vpace [unfilled]% [Apace-Vsense ___ %] : Apace-Vsense [unfilled]% [Apace-Vpace ___ %] : Apace-Vpace [unfilled]% [de-identified] : Reese MRI XT  [de-identified] : [id number] [de-identified] : [id number] [de-identified] : 12/21/21 [de-identified] : AAIR/DDDR [de-identified] :  [de-identified] : Many NSVT episodes, all appear to be SVT. Most recent on 2/28/23 for 4 seconds.

## 2024-07-30 NOTE — PHYSICAL EXAM
[Normal S1, S2] : normal S1, S2 [Wheeze ____] : wheeze [unfilled] [General Appearance - Well Developed] : well developed [Normal Appearance] : normal appearance [Well Groomed] : well groomed [General Appearance - Well Nourished] : well nourished [No Deformities] : no deformities [General Appearance - In No Acute Distress] : no acute distress [Heart Rate And Rhythm] : heart rate and rhythm were normal [Heart Sounds] : normal S1 and S2 [Murmurs] : no murmurs present [Clean] : clean [Dry] : dry [Well-Healed] : well-healed [de-identified] : RRR

## 2024-07-30 NOTE — HISTORY OF PRESENT ILLNESS
[FreeTextEntry1] : PT with CAD S/P STENTS X3 10/2006, 2 PCI 2006, CABG X 3 [facility]  AFTER AICD Discharge, ICM WITH LVEF 25%. HTN, OLD MI, family h/o POLYCYTHEMIA VERA  SDH . LV APICAL THROMBUS 2.5 x 1.3 cm in apex, BPV, ORTHOSTATIC HYPOTENSION, WEDGE RESECTION FOR LUNG CA    PMD: ZORAIDA  EP:  SONYA   PT GOES FOR phlebotomy  EVERY 2 WEEKS POLYCYTHEMIA VERA  : carotid: b/l plaque, less than 50% b/l ICA.  : NO evidence AAA, mild atherosclerosis DA ntbnp: 335 HDL 39  LDL 91   23: Patient states he had a seizure about two ago, pt had seizure and was in hospital after AICD firing. pt had to see neurosurgery and had TRAUMATIC SDH.   1. Left ventricular ejection fraction, by visual estimation, is 20 to 25%.  2. Severely decreased global left ventricular systolic function.  5. 2.5 x 1.3 cm filling defect on the contrast enhanced images consistent with LV apical thrombus.  6. Mildly enlarged right ventricle.  7. Mildly reduced RV systolic function. PT NOT FEELING WELL.  NEUROSX: [facility]   23: echo before appointment  pt was in hospital and had ? apical lv thrombus and SDH after VT requiring shock, pt may need vt ablation after clot resolves. pt is on elqiuis and amio 200 qd DR. HASSAN IS EPS.   ECHO TODAY WITH LVEF improved 25% to 35% With contrast shows well organized thrombus in apex and improved vs prior. pt may need procedure, pt complains of lots of dizziness.  spent over 60 minutes with patient and reviewed all echos with patient.  pt say dizziness room spinnning.   23: pt preop with dr. shah for lung surgery for nodule vs ca, here for f/u of echo and preop risk assessment. pt had echo with lvef 25 to 35% and well organized thrombus in apex unchanged vs prior no freely mobile segments, pt is on eliquis.  pt has not issues going up and downstairs at home and does it slowly and able to do gardening, pt with vertigo and dizziness at times with room spinning.   10/18/23: PT S/P WEDGE RESECTION WITH DR. SHAH was CANCER BUT DOES NOT NEED CHEMO OR RADIATION. PT WALKING half a mile and doing ok, denies sob on meds. pt back on eliquis  PT STILL ON AMIODARONE 200 mg po qd   3/29/24:  24: LVEF 30 to 35% (mildly improved from past), distal anteroapical apical and apical lateral dyskinesis, e' sept: 0.04 m/s. LVOT VTI: 8.3 cm, CO: 2.09 l/min  mild AS. WELL ORGANIZED APICAL THROMBUS TO ANTEROAPICAL WALL.  APPRECIATE EP note, pt to continue amiodarone but reduced to 100 mg daily as no events.  : GFR: 53 bun/cr: 15/1.4,  pt feeling ok and not very active. pt says not sob, pt does walk on flatground with no issues. pt not sure if can play golf.  no bleeding.  pt still with vertigo intermittent off and on not taking MECLIZINE NOT WORKING.   24: BPV on meclizine was not working was to see ENT for PT.  pt does not want hearing aids and did not get PT, he has debilitating verigo but has been resistant to do anything.  pt has not passed out.  pt forgot to do bloodtest. pt did not eat anything. no bleeding problems.  pt walks a litttle bit, pt says vertigo intermittent with bending now and leaning over.   24: GFR: 49, BNP: 404, HDL: 36, T, LDL:146, A1C: 6, TSH: 4.24 24: LDL: 146 on Atorvastatin and Zetia. Send Repatha.  [de-identified] : \par

## 2024-07-30 NOTE — HISTORY OF PRESENT ILLNESS
[FreeTextEntry1] : PT with CAD S/P STENTS X3 10/2006, 2 PCI 2006, CABG X 3 [facility]  AFTER AICD Discharge, ICM WITH LVEF 25%. HTN, OLD MI, family h/o POLYCYTHEMIA VERA  SDH . LV APICAL THROMBUS 2.5 x 1.3 cm in apex, BPV, ORTHOSTATIC HYPOTENSION, WEDGE RESECTION FOR LUNG CA    PMD: ZORAIDA  EP:  SONYA   PT GOES FOR phlebotomy  EVERY 2 WEEKS POLYCYTHEMIA VERA  : carotid: b/l plaque, less than 50% b/l ICA.  : NO evidence AAA, mild atherosclerosis DA ntbnp: 335 HDL 39  LDL 91   23: Patient states he had a seizure about two ago, pt had seizure and was in hospital after AICD firing. pt had to see neurosurgery and had TRAUMATIC SDH.   1. Left ventricular ejection fraction, by visual estimation, is 20 to 25%.  2. Severely decreased global left ventricular systolic function.  5. 2.5 x 1.3 cm filling defect on the contrast enhanced images consistent with LV apical thrombus.  6. Mildly enlarged right ventricle.  7. Mildly reduced RV systolic function. PT NOT FEELING WELL.  NEUROSX: [facility]   23: echo before appointment  pt was in hospital and had ? apical lv thrombus and SDH after VT requiring shock, pt may need vt ablation after clot resolves. pt is on elqiuis and amio 200 qd DR. HASSAN IS EPS.   ECHO TODAY WITH LVEF improved 25% to 35% With contrast shows well organized thrombus in apex and improved vs prior. pt may need procedure, pt complains of lots of dizziness.  spent over 60 minutes with patient and reviewed all echos with patient.  pt say dizziness room spinnning.   23: pt preop with dr. shah for lung surgery for nodule vs ca, here for f/u of echo and preop risk assessment. pt had echo with lvef 25 to 35% and well organized thrombus in apex unchanged vs prior no freely mobile segments, pt is on eliquis.  pt has not issues going up and downstairs at home and does it slowly and able to do gardening, pt with vertigo and dizziness at times with room spinning.   10/18/23: PT S/P WEDGE RESECTION WITH DR. SHAH was CANCER BUT DOES NOT NEED CHEMO OR RADIATION. PT WALKING half a mile and doing ok, denies sob on meds. pt back on eliquis  PT STILL ON AMIODARONE 200 mg po qd   3/29/24:  24: LVEF 30 to 35% (mildly improved from past), distal anteroapical apical and apical lateral dyskinesis, e' sept: 0.04 m/s. LVOT VTI: 8.3 cm, CO: 2.09 l/min  mild AS. WELL ORGANIZED APICAL THROMBUS TO ANTEROAPICAL WALL.  APPRECIATE EP note, pt to continue amiodarone but reduced to 100 mg daily as no events.  : GFR: 53 bun/cr: 15/1.4,  pt feeling ok and not very active. pt says not sob, pt does walk on flatground with no issues. pt not sure if can play golf.  no bleeding.  pt still with vertigo intermittent off and on not taking MECLIZINE NOT WORKING.   24: BPV on meclizine was not working was to see ENT for PT.  pt does not want hearing aids and did not get PT, he has debilitating verigo but has been resistant to do anything.  pt has not passed out.  pt forgot to do bloodtest. pt did not eat anything. no bleeding problems.  pt walks a litttle bit, pt says vertigo intermittent with bending now and leaning over.   24: GFR: 49, BNP: 404, HDL: 36, T, LDL:146, A1C: 6, TSH: 4.24 24: LDL: 146 on Atorvastatin and Zetia. Send Repatha.  [de-identified] : \par

## 2024-07-30 NOTE — PHYSICAL EXAM
[Normal S1, S2] : normal S1, S2 [Wheeze ____] : wheeze [unfilled] [General Appearance - Well Developed] : well developed [Normal Appearance] : normal appearance [Well Groomed] : well groomed [General Appearance - Well Nourished] : well nourished [No Deformities] : no deformities [General Appearance - In No Acute Distress] : no acute distress [Heart Rate And Rhythm] : heart rate and rhythm were normal [Heart Sounds] : normal S1 and S2 [Murmurs] : no murmurs present [Clean] : clean [Dry] : dry [Well-Healed] : well-healed [de-identified] : RRR

## 2024-07-30 NOTE — PHYSICAL EXAM
[Normal S1, S2] : normal S1, S2 [Wheeze ____] : wheeze [unfilled] [General Appearance - Well Developed] : well developed [Normal Appearance] : normal appearance [Well Groomed] : well groomed [General Appearance - Well Nourished] : well nourished [No Deformities] : no deformities [General Appearance - In No Acute Distress] : no acute distress [Heart Rate And Rhythm] : heart rate and rhythm were normal [Heart Sounds] : normal S1 and S2 [Murmurs] : no murmurs present [Clean] : clean [Dry] : dry [Well-Healed] : well-healed [de-identified] : RRR

## 2024-08-27 ENCOUNTER — APPOINTMENT (OUTPATIENT)
Dept: ELECTROPHYSIOLOGY | Facility: CLINIC | Age: 74
End: 2024-08-27
Payer: MEDICARE

## 2024-08-27 VITALS
WEIGHT: 175 LBS | HEIGHT: 71 IN | HEART RATE: 94 BPM | SYSTOLIC BLOOD PRESSURE: 130 MMHG | DIASTOLIC BLOOD PRESSURE: 80 MMHG | BODY MASS INDEX: 24.5 KG/M2

## 2024-08-27 PROCEDURE — 93290 INTERROG DEV EVAL ICPMS IP: CPT | Mod: 26

## 2024-08-27 PROCEDURE — 93000 ELECTROCARDIOGRAM COMPLETE: CPT | Mod: 59

## 2024-08-27 PROCEDURE — G2211 COMPLEX E/M VISIT ADD ON: CPT

## 2024-08-27 PROCEDURE — 99214 OFFICE O/P EST MOD 30 MIN: CPT

## 2024-08-27 PROCEDURE — 93283 PRGRMG EVAL IMPLANTABLE DFB: CPT

## 2024-08-27 NOTE — PROCEDURE
[No] : not [NSR] : normal sinus rhythm [ICD] : Implantable cardioverter-defibrillator [Voltage: ___ volts] : Voltage was [unfilled] volts [Charge Time: ___ sec] : charge time was [unfilled] seconds [Longevity: ___ months] : The estimated remaining battery life is [unfilled] months [Normal] : The battery status is normal. [Threshold Testing Performed] : Threshold testing was performed [Atrial] : Atrial [Ventricular] : Ventricular [Lead Imp:  ___ohms] : lead impedance was [unfilled] ohms [Sensing Amplitude ___mv] : sensing amplitude was [unfilled] mv [___V @] : [unfilled] V [___ ms] : [unfilled] ms [None] : none [Programmed for Longevity] : output reprogrammed for improved battery longevity [Counters Reset] : the counters were reset [de-identified] : 76 BPM [de-identified] : [place] [de-identified] : Reese MRI XT  [id number] [de-identified] : [id number] [de-identified] : 12/21/2021 [de-identified] : COURTNEY [de-identified] :  [de-identified] : AP: 69.7% // : <0.1%  OptiVol fluid WNL.  Transmitting on [employer].

## 2024-08-27 NOTE — ADDENDUM
[FreeTextEntry1] : Chika BOUDREAUX assisted in documentation on 08/27/2024 acting as a scribe for Dr. Calvin Laguerre.

## 2024-08-27 NOTE — ASSESSMENT
[FreeTextEntry1] : ## Ischemic cardiomyopathy  # VT s/p DC-ICD (MDT, 21, non-dep) ## Cardiotoxic Drug Monitoring   - ICD interrogation shows normally functioning DC-ICD. Battery life ok. Optivol below threshold. No new events.   - Echo showed organized LV thrombus. Considering one episode of VT, doing fine on Amiodarone, no further episodes of VT, and organized LV thrombus, we will continue with medical management for now.  - Continue Amiodarone 100 mg.  - Dizziness is likely due to hypotension versus external ear problem. External ear treatment as per PCP.  - TSH/LFT q6 months. - TSH showed hypothyroidism in July. Considering Hx of VT with LV thrombus, we want to continue Amiodarone as long as possible. Will refer him to his PCP to see if thyroid replacement can be done instead of DCing Amiodarone.   - GDMT as per cardiology.  - Remote monitoring. - RTC in 6 months.

## 2024-08-27 NOTE — HISTORY OF PRESENT ILLNESS
[de-identified] : Mr. Jeronimo is a 72 year-old male with hx of CAD,  PCI, MI s/p CABG, ischemic cardiomyopathy s/p ICD, HTN, dyslipidemia, COPD, VT s/p ICD shock, is here to establish care.   Was followed by Dr. Padron  05/25/2023: Feels fine. Had a sustained monomorphic VT, needed ICD shock. Was admitted at [facility]. Found to have LV thrombus. Started on anticoagulation. 06/29/2023: He is here for echo report and to discuss further management of VT. Accompanied by his wife. Feels dizzy at times.  02/21/2024: Feels fine.   08/27/2024: Feels fine. Had an abnormal dream and attributed to the medication. Only occurred once. Also feels some memory issues from time to time.   Accompanied by son.   Denies chest pain, shortness of breath, palpitation, dizziness or LOC except noted above.   EKG (08/27/2024): SR @ 94, , QRS 88, QTc 452 EKG (02/21/2024): SR   EKG (06/29/2023): SR @ 78, , QRS 86, QTc 406 EKG (05/25/2023): SR  EKG (03/02/2023): SR  Cardio: Dr. Elizalde

## 2024-11-20 ENCOUNTER — APPOINTMENT (OUTPATIENT)
Dept: CARDIOLOGY | Facility: CLINIC | Age: 74
End: 2024-11-20
Payer: MEDICARE

## 2024-11-20 PROCEDURE — 93880 EXTRACRANIAL BILAT STUDY: CPT

## 2024-11-27 ENCOUNTER — APPOINTMENT (OUTPATIENT)
Dept: CARDIOLOGY | Facility: CLINIC | Age: 74
End: 2024-11-27
Payer: MEDICARE

## 2024-11-27 ENCOUNTER — NON-APPOINTMENT (OUTPATIENT)
Age: 74
End: 2024-11-27

## 2024-11-27 PROCEDURE — 93295 DEV INTERROG REMOTE 1/2/MLT: CPT

## 2024-11-27 PROCEDURE — 93296 REM INTERROG EVL PM/IDS: CPT

## 2024-12-04 ENCOUNTER — RX RENEWAL (OUTPATIENT)
Age: 74
End: 2024-12-04

## 2024-12-17 ENCOUNTER — APPOINTMENT (OUTPATIENT)
Dept: CARDIOLOGY | Facility: CLINIC | Age: 74
End: 2024-12-17
Payer: MEDICARE

## 2024-12-17 ENCOUNTER — NON-APPOINTMENT (OUTPATIENT)
Age: 74
End: 2024-12-17

## 2024-12-17 VITALS
BODY MASS INDEX: 24.5 KG/M2 | HEART RATE: 90 BPM | OXYGEN SATURATION: 98 % | DIASTOLIC BLOOD PRESSURE: 80 MMHG | SYSTOLIC BLOOD PRESSURE: 126 MMHG | HEIGHT: 71 IN | WEIGHT: 175 LBS

## 2024-12-17 DIAGNOSIS — Z98.61 CORONARY ANGIOPLASTY STATUS: ICD-10-CM

## 2024-12-17 DIAGNOSIS — I25.2 OLD MYOCARDIAL INFARCTION: ICD-10-CM

## 2024-12-17 DIAGNOSIS — I48.91 UNSPECIFIED ATRIAL FIBRILLATION: ICD-10-CM

## 2024-12-17 DIAGNOSIS — Z95.810 PRESENCE OF AUTOMATIC (IMPLANTABLE) CARDIAC DEFIBRILLATOR: ICD-10-CM

## 2024-12-17 DIAGNOSIS — Z95.1 PRESENCE OF AORTOCORONARY BYPASS GRAFT: ICD-10-CM

## 2024-12-17 DIAGNOSIS — I10 ESSENTIAL (PRIMARY) HYPERTENSION: ICD-10-CM

## 2024-12-17 DIAGNOSIS — R42 DIZZINESS AND GIDDINESS: ICD-10-CM

## 2024-12-17 DIAGNOSIS — I50.22 CHRONIC SYSTOLIC (CONGESTIVE) HEART FAILURE: ICD-10-CM

## 2024-12-17 DIAGNOSIS — H90.5 UNSPECIFIED SENSORINEURAL HEARING LOSS: ICD-10-CM

## 2024-12-17 DIAGNOSIS — I51.3 INTRACARDIAC THROMBOSIS, NOT ELSEWHERE CLASSIFIED: ICD-10-CM

## 2024-12-17 PROCEDURE — 99214 OFFICE O/P EST MOD 30 MIN: CPT

## 2024-12-17 PROCEDURE — G2211 COMPLEX E/M VISIT ADD ON: CPT

## 2024-12-18 LAB
ALBUMIN SERPL ELPH-MCNC: 4.5 G/DL
ALP BLD-CCNC: 76 U/L
ALT SERPL-CCNC: 11 U/L
ANION GAP SERPL CALC-SCNC: 13 MMOL/L
AST SERPL-CCNC: 13 U/L
BILIRUB SERPL-MCNC: 0.5 MG/DL
BUN SERPL-MCNC: 18 MG/DL
CALCIUM SERPL-MCNC: 10.1 MG/DL
CHLORIDE SERPL-SCNC: 105 MMOL/L
CHOLEST SERPL-MCNC: 145 MG/DL
CO2 SERPL-SCNC: 23 MMOL/L
CREAT SERPL-MCNC: 1.41 MG/DL
EGFR: 52 ML/MIN/1.73M2
ESTIMATED AVERAGE GLUCOSE: 117 MG/DL
GLUCOSE SERPL-MCNC: 109 MG/DL
HBA1C MFR BLD HPLC: 5.7 %
HCT VFR BLD CALC: 48.2 %
HDLC SERPL-MCNC: 39 MG/DL
HGB BLD-MCNC: 14.3 G/DL
LDLC SERPL CALC-MCNC: 90 MG/DL
MCHC RBC-ENTMCNC: 25.1 PG
MCHC RBC-ENTMCNC: 29.7 G/DL
MCV RBC AUTO: 84.6 FL
NONHDLC SERPL-MCNC: 105 MG/DL
NT-PROBNP SERPL-MCNC: 596 PG/ML
PLATELET # BLD AUTO: 174 K/UL
POTASSIUM SERPL-SCNC: 4.6 MMOL/L
PROT SERPL-MCNC: 7.7 G/DL
RBC # BLD: 5.7 M/UL
RBC # FLD: 19.9 %
SODIUM SERPL-SCNC: 141 MMOL/L
TRIGL SERPL-MCNC: 81 MG/DL
TSH SERPL-ACNC: 3.39 UIU/ML
WBC # FLD AUTO: 7.52 K/UL

## 2025-03-05 ENCOUNTER — APPOINTMENT (OUTPATIENT)
Dept: ELECTROPHYSIOLOGY | Facility: CLINIC | Age: 75
End: 2025-03-05

## 2025-03-05 VITALS
WEIGHT: 172 LBS | SYSTOLIC BLOOD PRESSURE: 102 MMHG | HEIGHT: 71 IN | BODY MASS INDEX: 24.08 KG/M2 | HEART RATE: 81 BPM | DIASTOLIC BLOOD PRESSURE: 70 MMHG

## 2025-03-05 PROCEDURE — 93290 INTERROG DEV EVAL ICPMS IP: CPT

## 2025-03-05 PROCEDURE — 93000 ELECTROCARDIOGRAM COMPLETE: CPT | Mod: 59

## 2025-03-05 PROCEDURE — 93283 PRGRMG EVAL IMPLANTABLE DFB: CPT

## 2025-03-05 PROCEDURE — 99214 OFFICE O/P EST MOD 30 MIN: CPT | Mod: 25

## 2025-03-24 ENCOUNTER — RX RENEWAL (OUTPATIENT)
Age: 75
End: 2025-03-24

## 2025-04-09 ENCOUNTER — APPOINTMENT (OUTPATIENT)
Dept: CARDIOLOGY | Facility: CLINIC | Age: 75
End: 2025-04-09
Payer: MEDICARE

## 2025-04-09 ENCOUNTER — NON-APPOINTMENT (OUTPATIENT)
Age: 75
End: 2025-04-09

## 2025-04-09 PROCEDURE — 93306 TTE W/DOPPLER COMPLETE: CPT

## 2025-04-22 ENCOUNTER — NON-APPOINTMENT (OUTPATIENT)
Age: 75
End: 2025-04-22

## 2025-04-22 ENCOUNTER — APPOINTMENT (OUTPATIENT)
Dept: CARDIOLOGY | Facility: CLINIC | Age: 75
End: 2025-04-22
Payer: MEDICARE

## 2025-04-22 VITALS
OXYGEN SATURATION: 97 % | HEART RATE: 107 BPM | WEIGHT: 173 LBS | SYSTOLIC BLOOD PRESSURE: 110 MMHG | BODY MASS INDEX: 24.22 KG/M2 | RESPIRATION RATE: 14 BRPM | DIASTOLIC BLOOD PRESSURE: 70 MMHG | HEIGHT: 71 IN

## 2025-04-22 DIAGNOSIS — I65.23 OCCLUSION AND STENOSIS OF BILATERAL CAROTID ARTERIES: ICD-10-CM

## 2025-04-22 DIAGNOSIS — Z95.1 PRESENCE OF AORTOCORONARY BYPASS GRAFT: ICD-10-CM

## 2025-04-22 DIAGNOSIS — R94.31 ABNORMAL ELECTROCARDIOGRAM [ECG] [EKG]: ICD-10-CM

## 2025-04-22 DIAGNOSIS — I10 ESSENTIAL (PRIMARY) HYPERTENSION: ICD-10-CM

## 2025-04-22 DIAGNOSIS — I48.91 UNSPECIFIED ATRIAL FIBRILLATION: ICD-10-CM

## 2025-04-22 DIAGNOSIS — I47.20 VENTRICULAR TACHYCARDIA, UNSPECIFIED: ICD-10-CM

## 2025-04-22 DIAGNOSIS — I50.22 CHRONIC SYSTOLIC (CONGESTIVE) HEART FAILURE: ICD-10-CM

## 2025-04-22 DIAGNOSIS — R06.02 SHORTNESS OF BREATH: ICD-10-CM

## 2025-04-22 DIAGNOSIS — I25.2 OLD MYOCARDIAL INFARCTION: ICD-10-CM

## 2025-04-22 DIAGNOSIS — R42 DIZZINESS AND GIDDINESS: ICD-10-CM

## 2025-04-22 PROCEDURE — 99204 OFFICE O/P NEW MOD 45 MIN: CPT

## 2025-05-29 ENCOUNTER — APPOINTMENT (OUTPATIENT)
Dept: CARDIOLOGY | Facility: CLINIC | Age: 75
End: 2025-05-29
Payer: MEDICARE

## 2025-05-29 ENCOUNTER — NON-APPOINTMENT (OUTPATIENT)
Age: 75
End: 2025-05-29

## 2025-05-29 PROCEDURE — 93296 REM INTERROG EVL PM/IDS: CPT

## 2025-05-29 PROCEDURE — 93295 DEV INTERROG REMOTE 1/2/MLT: CPT

## 2025-08-14 ENCOUNTER — APPOINTMENT (OUTPATIENT)
Facility: CLINIC | Age: 75
End: 2025-08-14
Payer: MEDICARE

## 2025-08-14 VITALS
BODY MASS INDEX: 24.22 KG/M2 | HEART RATE: 82 BPM | DIASTOLIC BLOOD PRESSURE: 70 MMHG | HEIGHT: 71 IN | SYSTOLIC BLOOD PRESSURE: 106 MMHG | OXYGEN SATURATION: 90 % | WEIGHT: 173 LBS

## 2025-08-14 PROCEDURE — 93290 INTERROG DEV EVAL ICPMS IP: CPT

## 2025-08-14 PROCEDURE — 93000 ELECTROCARDIOGRAM COMPLETE: CPT | Mod: 59

## 2025-08-14 PROCEDURE — 99205 OFFICE O/P NEW HI 60 MIN: CPT

## 2025-08-14 PROCEDURE — 93283 PRGRMG EVAL IMPLANTABLE DFB: CPT

## 2025-08-14 PROCEDURE — 99215 OFFICE O/P EST HI 40 MIN: CPT

## 2025-08-26 ENCOUNTER — APPOINTMENT (OUTPATIENT)
Dept: CARDIOLOGY | Facility: CLINIC | Age: 75
End: 2025-08-26
Payer: MEDICARE

## 2025-08-26 ENCOUNTER — APPOINTMENT (OUTPATIENT)
Dept: CARDIOTHORACIC SURGERY | Facility: CLINIC | Age: 75
End: 2025-08-26
Payer: MEDICARE

## 2025-08-26 VITALS
SYSTOLIC BLOOD PRESSURE: 104 MMHG | BODY MASS INDEX: 23.38 KG/M2 | DIASTOLIC BLOOD PRESSURE: 72 MMHG | HEIGHT: 71 IN | WEIGHT: 167 LBS

## 2025-08-26 VITALS
WEIGHT: 166 LBS | BODY MASS INDEX: 23.24 KG/M2 | OXYGEN SATURATION: 95 % | TEMPERATURE: 97.7 F | HEART RATE: 84 BPM | RESPIRATION RATE: 16 BRPM | SYSTOLIC BLOOD PRESSURE: 125 MMHG | DIASTOLIC BLOOD PRESSURE: 91 MMHG | HEIGHT: 71 IN

## 2025-08-26 DIAGNOSIS — R42 DIZZINESS AND GIDDINESS: ICD-10-CM

## 2025-08-26 DIAGNOSIS — I65.23 OCCLUSION AND STENOSIS OF BILATERAL CAROTID ARTERIES: ICD-10-CM

## 2025-08-26 DIAGNOSIS — Z95.1 PRESENCE OF AORTOCORONARY BYPASS GRAFT: ICD-10-CM

## 2025-08-26 DIAGNOSIS — I10 ESSENTIAL (PRIMARY) HYPERTENSION: ICD-10-CM

## 2025-08-26 DIAGNOSIS — Z95.810 PRESENCE OF AUTOMATIC (IMPLANTABLE) CARDIAC DEFIBRILLATOR: ICD-10-CM

## 2025-08-26 DIAGNOSIS — Z98.61 CORONARY ANGIOPLASTY STATUS: ICD-10-CM

## 2025-08-26 DIAGNOSIS — I50.22 CHRONIC SYSTOLIC (CONGESTIVE) HEART FAILURE: ICD-10-CM

## 2025-08-26 DIAGNOSIS — G89.18 OTHER ACUTE POSTPROCEDURAL PAIN: ICD-10-CM

## 2025-08-26 DIAGNOSIS — I48.91 UNSPECIFIED ATRIAL FIBRILLATION: ICD-10-CM

## 2025-08-26 DIAGNOSIS — I51.3 INTRACARDIAC THROMBOSIS, NOT ELSEWHERE CLASSIFIED: ICD-10-CM

## 2025-08-26 PROCEDURE — 99214 OFFICE O/P EST MOD 30 MIN: CPT

## 2025-08-26 PROCEDURE — 99204 OFFICE O/P NEW MOD 45 MIN: CPT

## 2025-08-26 PROCEDURE — G2211 COMPLEX E/M VISIT ADD ON: CPT

## 2025-08-27 ENCOUNTER — APPOINTMENT (OUTPATIENT)
Dept: ELECTROPHYSIOLOGY | Facility: CLINIC | Age: 75
End: 2025-08-27

## 2025-08-27 ENCOUNTER — NON-APPOINTMENT (OUTPATIENT)
Age: 75
End: 2025-08-27

## 2025-08-27 VITALS
WEIGHT: 170 LBS | HEART RATE: 73 BPM | HEIGHT: 71 IN | DIASTOLIC BLOOD PRESSURE: 88 MMHG | SYSTOLIC BLOOD PRESSURE: 136 MMHG | BODY MASS INDEX: 23.8 KG/M2

## 2025-08-27 PROCEDURE — 99215 OFFICE O/P EST HI 40 MIN: CPT

## 2025-08-27 PROCEDURE — 93000 ELECTROCARDIOGRAM COMPLETE: CPT | Mod: 59

## 2025-08-27 PROCEDURE — 93283 PRGRMG EVAL IMPLANTABLE DFB: CPT

## 2025-08-27 PROCEDURE — 93290 INTERROG DEV EVAL ICPMS IP: CPT

## 2025-08-27 RX ORDER — CHLORHEXIDINE GLUCONATE 4 %
325 (65 FE) LIQUID (ML) TOPICAL DAILY
Refills: 0 | Status: ACTIVE | COMMUNITY

## 2025-08-27 RX ORDER — ATORVASTATIN CALCIUM 40 MG/1
40 TABLET, FILM COATED ORAL DAILY
Refills: 0 | Status: ACTIVE | COMMUNITY

## 2025-08-27 RX ORDER — TAMSULOSIN HYDROCHLORIDE 0.4 MG/1
0.4 CAPSULE ORAL
Refills: 0 | Status: ACTIVE | COMMUNITY

## 2025-08-27 RX ORDER — EVOLOCUMAB 140 MG/ML
140 INJECTION, SOLUTION SUBCUTANEOUS
Qty: 6 | Refills: 3 | Status: COMPLETED | COMMUNITY
Start: 2025-08-26 | End: 2025-08-27

## 2025-08-29 RX ORDER — EVOLOCUMAB 140 MG/ML
140 INJECTION, SOLUTION SUBCUTANEOUS
Qty: 2 | Refills: 5 | Status: ACTIVE | COMMUNITY
Start: 2025-08-29 | End: 1900-01-01

## 2025-09-04 ENCOUNTER — OUTPATIENT (OUTPATIENT)
Dept: OUTPATIENT SERVICES | Facility: HOSPITAL | Age: 75
LOS: 1 days | End: 2025-09-04
Payer: MEDICARE

## 2025-09-04 VITALS
TEMPERATURE: 98 F | SYSTOLIC BLOOD PRESSURE: 128 MMHG | HEART RATE: 66 BPM | HEIGHT: 70.87 IN | OXYGEN SATURATION: 97 % | RESPIRATION RATE: 18 BRPM | DIASTOLIC BLOOD PRESSURE: 74 MMHG | WEIGHT: 169.76 LBS

## 2025-09-04 DIAGNOSIS — Z95.5 PRESENCE OF CORONARY ANGIOPLASTY IMPLANT AND GRAFT: Chronic | ICD-10-CM

## 2025-09-04 DIAGNOSIS — I25.10 ATHEROSCLEROTIC HEART DISEASE OF NATIVE CORONARY ARTERY WITHOUT ANGINA PECTORIS: Chronic | ICD-10-CM

## 2025-09-04 DIAGNOSIS — I50.22 CHRONIC SYSTOLIC (CONGESTIVE) HEART FAILURE: ICD-10-CM

## 2025-09-04 DIAGNOSIS — Z01.818 ENCOUNTER FOR OTHER PREPROCEDURAL EXAMINATION: ICD-10-CM

## 2025-09-04 LAB
ALBUMIN SERPL ELPH-MCNC: 4.4 G/DL — SIGNIFICANT CHANGE UP (ref 3.5–5.2)
ALP SERPL-CCNC: 73 U/L — SIGNIFICANT CHANGE UP (ref 30–115)
ALT FLD-CCNC: 8 U/L — SIGNIFICANT CHANGE UP (ref 0–41)
ANION GAP SERPL CALC-SCNC: 12 MMOL/L — SIGNIFICANT CHANGE UP (ref 7–14)
APPEARANCE UR: CLEAR — SIGNIFICANT CHANGE UP
AST SERPL-CCNC: 15 U/L — SIGNIFICANT CHANGE UP (ref 0–41)
BACTERIA # UR AUTO: ABNORMAL /HPF
BASOPHILS # BLD AUTO: 0.03 K/UL — SIGNIFICANT CHANGE UP (ref 0–0.2)
BASOPHILS NFR BLD AUTO: 0.4 % — SIGNIFICANT CHANGE UP (ref 0–1)
BILIRUB SERPL-MCNC: 0.5 MG/DL — SIGNIFICANT CHANGE UP (ref 0.2–1.2)
BILIRUB UR-MCNC: NEGATIVE — SIGNIFICANT CHANGE UP
BLD GP AB SCN SERPL QL: SIGNIFICANT CHANGE UP
BUN SERPL-MCNC: 15 MG/DL — SIGNIFICANT CHANGE UP (ref 10–20)
CALCIUM SERPL-MCNC: 9.7 MG/DL — SIGNIFICANT CHANGE UP (ref 8.4–10.5)
CAST: 9 /LPF — HIGH (ref 0–4)
CHLORIDE SERPL-SCNC: 105 MMOL/L — SIGNIFICANT CHANGE UP (ref 98–110)
CO2 SERPL-SCNC: 23 MMOL/L — SIGNIFICANT CHANGE UP (ref 17–32)
COLOR SPEC: SIGNIFICANT CHANGE UP
CREAT SERPL-MCNC: 1.4 MG/DL — SIGNIFICANT CHANGE UP (ref 0.7–1.5)
DIFF PNL FLD: NEGATIVE — SIGNIFICANT CHANGE UP
EGFR: 52 ML/MIN/1.73M2 — LOW
EGFR: 52 ML/MIN/1.73M2 — LOW
EOSINOPHIL # BLD AUTO: 0.04 K/UL — SIGNIFICANT CHANGE UP (ref 0–0.7)
EOSINOPHIL NFR BLD AUTO: 0.6 % — SIGNIFICANT CHANGE UP (ref 0–8)
GLUCOSE SERPL-MCNC: 75 MG/DL — SIGNIFICANT CHANGE UP (ref 70–99)
GLUCOSE UR QL: NEGATIVE MG/DL — SIGNIFICANT CHANGE UP
HCT VFR BLD CALC: 49.9 % — SIGNIFICANT CHANGE UP (ref 42–52)
HGB BLD-MCNC: 16 G/DL — SIGNIFICANT CHANGE UP (ref 14–18)
IMM GRANULOCYTES NFR BLD AUTO: 0.4 % — HIGH (ref 0.1–0.3)
KETONES UR QL: ABNORMAL MG/DL
LEUKOCYTE ESTERASE UR-ACNC: ABNORMAL
LYMPHOCYTES # BLD AUTO: 1.06 K/UL — LOW (ref 1.2–3.4)
LYMPHOCYTES # BLD AUTO: 14.8 % — LOW (ref 20.5–51.1)
MCHC RBC-ENTMCNC: 27.7 PG — SIGNIFICANT CHANGE UP (ref 27–31)
MCHC RBC-ENTMCNC: 32.1 G/DL — SIGNIFICANT CHANGE UP (ref 32–37)
MCV RBC AUTO: 86.3 FL — SIGNIFICANT CHANGE UP (ref 80–94)
MONOCYTES # BLD AUTO: 0.58 K/UL — SIGNIFICANT CHANGE UP (ref 0.1–0.6)
MONOCYTES NFR BLD AUTO: 8.1 % — SIGNIFICANT CHANGE UP (ref 1.7–9.3)
MRSA PCR RESULT.: SIGNIFICANT CHANGE UP
NEUTROPHILS # BLD AUTO: 5.41 K/UL — SIGNIFICANT CHANGE UP (ref 1.4–6.5)
NEUTROPHILS NFR BLD AUTO: 75.7 % — HIGH (ref 42.2–75.2)
NITRITE UR-MCNC: NEGATIVE — SIGNIFICANT CHANGE UP
NRBC BLD AUTO-RTO: 0 /100 WBCS — SIGNIFICANT CHANGE UP (ref 0–0)
PH UR: 5.5 — SIGNIFICANT CHANGE UP (ref 5–8)
PLATELET # BLD AUTO: 145 K/UL — SIGNIFICANT CHANGE UP (ref 130–400)
PMV BLD: 11 FL — HIGH (ref 7.4–10.4)
POTASSIUM SERPL-MCNC: 4.3 MMOL/L — SIGNIFICANT CHANGE UP (ref 3.5–5)
POTASSIUM SERPL-SCNC: 4.3 MMOL/L — SIGNIFICANT CHANGE UP (ref 3.5–5)
PROT SERPL-MCNC: 7.7 G/DL — SIGNIFICANT CHANGE UP (ref 6–8)
PROT UR-MCNC: 100 MG/DL
RBC # BLD: 5.78 M/UL — SIGNIFICANT CHANGE UP (ref 4.7–6.1)
RBC # FLD: 18.1 % — HIGH (ref 11.5–14.5)
RBC CASTS # UR COMP ASSIST: 1 /HPF — SIGNIFICANT CHANGE UP (ref 0–4)
SODIUM SERPL-SCNC: 140 MMOL/L — SIGNIFICANT CHANGE UP (ref 135–146)
SP GR SPEC: 1.03 — SIGNIFICANT CHANGE UP (ref 1–1.03)
SQUAMOUS # UR AUTO: >36 /HPF — HIGH (ref 0–5)
UROBILINOGEN FLD QL: 1 MG/DL — SIGNIFICANT CHANGE UP (ref 0.2–1)
WBC # BLD: 7.15 K/UL — SIGNIFICANT CHANGE UP (ref 4.8–10.8)
WBC # FLD AUTO: 7.15 K/UL — SIGNIFICANT CHANGE UP (ref 4.8–10.8)
WBC UR QL: 3 /HPF — SIGNIFICANT CHANGE UP (ref 0–5)

## 2025-09-04 PROCEDURE — 85025 COMPLETE CBC W/AUTO DIFF WBC: CPT

## 2025-09-04 PROCEDURE — 87641 MR-STAPH DNA AMP PROBE: CPT

## 2025-09-04 PROCEDURE — 87086 URINE CULTURE/COLONY COUNT: CPT

## 2025-09-04 PROCEDURE — 86900 BLOOD TYPING SEROLOGIC ABO: CPT

## 2025-09-04 PROCEDURE — 87640 STAPH A DNA AMP PROBE: CPT

## 2025-09-04 PROCEDURE — 36415 COLL VENOUS BLD VENIPUNCTURE: CPT

## 2025-09-04 PROCEDURE — 80053 COMPREHEN METABOLIC PANEL: CPT

## 2025-09-04 PROCEDURE — 86901 BLOOD TYPING SEROLOGIC RH(D): CPT

## 2025-09-04 PROCEDURE — 93005 ELECTROCARDIOGRAM TRACING: CPT

## 2025-09-04 PROCEDURE — 93010 ELECTROCARDIOGRAM REPORT: CPT

## 2025-09-04 PROCEDURE — 99214 OFFICE O/P EST MOD 30 MIN: CPT | Mod: 25

## 2025-09-04 PROCEDURE — 86850 RBC ANTIBODY SCREEN: CPT

## 2025-09-04 PROCEDURE — 81001 URINALYSIS AUTO W/SCOPE: CPT

## 2025-09-04 RX ORDER — SACUBITRIL AND VALSARTAN 6; 6 MG/1; MG/1
1 PELLET ORAL
Refills: 0 | DISCHARGE

## 2025-09-05 DIAGNOSIS — I50.22 CHRONIC SYSTOLIC (CONGESTIVE) HEART FAILURE: ICD-10-CM

## 2025-09-05 DIAGNOSIS — Z01.818 ENCOUNTER FOR OTHER PREPROCEDURAL EXAMINATION: ICD-10-CM

## 2025-09-06 LAB
CULTURE RESULTS: SIGNIFICANT CHANGE UP
SPECIMEN SOURCE: SIGNIFICANT CHANGE UP

## 2025-09-12 ENCOUNTER — APPOINTMENT (OUTPATIENT)
Dept: ELECTROPHYSIOLOGY | Facility: HOSPITAL | Age: 75
End: 2025-09-12

## 2025-09-12 ENCOUNTER — TRANSCRIPTION ENCOUNTER (OUTPATIENT)
Age: 75
End: 2025-09-12

## 2025-09-12 PROBLEM — Z86.39 PERSONAL HISTORY OF OTHER ENDOCRINE, NUTRITIONAL AND METABOLIC DISEASE: Chronic | Status: ACTIVE | Noted: 2025-09-04

## 2025-09-12 PROBLEM — I50.9 HEART FAILURE, UNSPECIFIED: Chronic | Status: ACTIVE | Noted: 2025-09-04

## 2025-09-12 RX ORDER — AMLODIPINE BESYLATE 10 MG/1
1 TABLET ORAL
Refills: 0 | DISCHARGE

## 2025-09-12 RX ORDER — TAMSULOSIN HYDROCHLORIDE 0.4 MG/1
1 CAPSULE ORAL
Refills: 0 | DISCHARGE

## 2025-09-12 RX ORDER — APIXABAN 2.5 MG/1
1 TABLET, FILM COATED ORAL
Refills: 0 | DISCHARGE

## 2025-09-13 ENCOUNTER — TRANSCRIPTION ENCOUNTER (OUTPATIENT)
Age: 75
End: 2025-09-13

## 2025-09-18 ENCOUNTER — APPOINTMENT (OUTPATIENT)
Facility: CLINIC | Age: 75
End: 2025-09-18
Payer: MEDICARE

## 2025-09-18 VITALS
HEART RATE: 65 BPM | DIASTOLIC BLOOD PRESSURE: 72 MMHG | HEIGHT: 71 IN | SYSTOLIC BLOOD PRESSURE: 128 MMHG | WEIGHT: 169 LBS | BODY MASS INDEX: 23.66 KG/M2

## 2025-09-18 PROCEDURE — 93290 INTERROG DEV EVAL ICPMS IP: CPT

## 2025-09-18 PROCEDURE — 93283 PRGRMG EVAL IMPLANTABLE DFB: CPT

## 2025-09-18 PROCEDURE — 93000 ELECTROCARDIOGRAM COMPLETE: CPT | Mod: 59

## 2025-09-18 PROCEDURE — 99214 OFFICE O/P EST MOD 30 MIN: CPT | Mod: 24

## 2025-09-19 ENCOUNTER — APPOINTMENT (OUTPATIENT)
Dept: ELECTROPHYSIOLOGY | Facility: CLINIC | Age: 75
End: 2025-09-19